# Patient Record
Sex: FEMALE | Race: WHITE | Employment: OTHER | URBAN - METROPOLITAN AREA
[De-identification: names, ages, dates, MRNs, and addresses within clinical notes are randomized per-mention and may not be internally consistent; named-entity substitution may affect disease eponyms.]

---

## 2017-01-23 DIAGNOSIS — M79.7 FIBROMYALGIA: ICD-10-CM

## 2017-01-23 RX ORDER — ESCITALOPRAM OXALATE 5 MG/1
5 TABLET ORAL DAILY
Qty: 90 TAB | Refills: 0 | Status: SHIPPED | OUTPATIENT
Start: 2017-01-23 | End: 2017-05-22 | Stop reason: SDUPTHER

## 2017-02-14 ENCOUNTER — OFFICE VISIT (OUTPATIENT)
Dept: INTERNAL MEDICINE CLINIC | Age: 77
End: 2017-02-14

## 2017-02-14 VITALS
SYSTOLIC BLOOD PRESSURE: 156 MMHG | DIASTOLIC BLOOD PRESSURE: 76 MMHG | TEMPERATURE: 98.4 F | WEIGHT: 146 LBS | HEIGHT: 58 IN | HEART RATE: 61 BPM | RESPIRATION RATE: 18 BRPM | OXYGEN SATURATION: 97 % | BODY MASS INDEX: 30.64 KG/M2

## 2017-02-14 DIAGNOSIS — I10 ESSENTIAL HYPERTENSION, BENIGN: Primary | ICD-10-CM

## 2017-02-14 DIAGNOSIS — E78.00 HYPERCHOLESTEREMIA: ICD-10-CM

## 2017-02-14 DIAGNOSIS — F51.01 PRIMARY INSOMNIA: ICD-10-CM

## 2017-02-14 DIAGNOSIS — R73.02 GLUCOSE INTOLERANCE (IMPAIRED GLUCOSE TOLERANCE): ICD-10-CM

## 2017-02-14 DIAGNOSIS — M85.80 OSTEOPENIA: ICD-10-CM

## 2017-02-14 DIAGNOSIS — Z23 IMMUNIZATION DUE: ICD-10-CM

## 2017-02-14 RX ORDER — LISINOPRIL 20 MG/1
30 TABLET ORAL DAILY
Qty: 135 TAB | Refills: 1 | Status: SHIPPED | OUTPATIENT
Start: 2017-02-14 | End: 2017-08-29 | Stop reason: SDUPTHER

## 2017-02-14 NOTE — PROGRESS NOTES
Chief Complaint   Patient presents with    Labs     fasting          Since last visit pt fell in driveway 47/61/29. She was distracted by a blue [place] and the [place] [employer] and fell over. She is being followed by orthopedics. She is in a left foot cast after fracturing 5th metatarsal.      Subjective:   Katherin Smith is a 65 y.o. female with hypertension. Hypertension ROS: taking medications as instructed, no medication side effects noted, no TIA's, no chest pain on exertion, no dyspnea on exertion, no swelling of ankles. New concerns: none. Cholesterol  No se of meds    Insomnia  Taking amitriptline and lexapro. She reports the amitriptyline was not cause for fall. She fell off a rock.       Vit d/osteopenia  Vit d level was taken in June  1000 iu daily     Glucose intolerance  She is compliant with heart healthy diabetic diet but given her birthday has not been as compliant    Past Medical History   Diagnosis Date    Essential hypertension, benign     Fibromyalgia     Myalgia and myositis, unspecified     Other and unspecified hyperlipidemia     Other and unspecified hyperlipidemia 10/12/2010    Palpitations 10/12/2010    Sleep apnea      Dr. 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 90 Tab 0    amitriptyline (ELAVIL) 10 mg tablet Take 1 Tab by mouth nightly. 90 Tab 0    lisinopril (PRINIVIL, ZESTRIL) 20 mg tablet Take 1 Tab by mouth daily. 90 Tab 1    hydroCHLOROthiazide (HYDRODIURIL) 12.5 mg tablet Take 1 Tab by mouth daily. 90 Tab 1    atorvastatin (LIPITOR) 20 mg tablet Take 1 Tab by mouth daily. 90 Tab 1    nebivolol (BYSTOLIC) 5 mg tablet Take 1 tablet by mouth daily. 30 tablet 0    MULTIVITAMIN W-MINERALS/LUTEIN (CENTRUM SILVER PO) Take  by mouth.  Cholecalciferol, Vitamin D3, (VITAMIN D3) 1,000 unit cap Take 2,000 Units by mouth.  aspirin 81 mg tablet Take  by mouth.  calcium gluconate 650 mg Tab two (2) times a day.  diclofenac (VOLTAREN) 1 % topical gel Apply 1 g to affected area four (4) times daily.  20 g 1     Allergies   Allergen Reactions    Codeine Palpitations    Prednisone Palpitations    Vioxx [Rofecoxib] Palpitations       Review of Systems - General ROS: negative for  - sleep disturbance  negative for - chills, fatigue, fever, hot flashes, malaise, night sweats or weight gain  Cardiovascular ROS: no chest pain or dyspnea on exertion  Respiratory ROS: no cough, shortness of breath, or wheezing    Visit Vitals    /76 (BP 1 Location: Left arm, BP Patient Position: Sitting)    Pulse 61    Temp 98.4 °F (36.9 °C) (Oral)    Resp 18    Ht 4' 10\" (1.473 m)    Wt 146 lb (66.2 kg)  Comment: per patient    SpO2 97%    BMI 30.51 kg/m2     General Appearance:  Well developed, well nourished,alert and oriented x 3, and individual in no acute distress. Ears/Nose/Mouth/Throat:   Hearing grossly normal.         Neck: Supple, no lad, no bruits   Chest:   Lungs clear to auscultation bilaterally. Cardiovascular:  Regular rate and rhythm, S1, S2 normal, no murmur. Abdomen:   Soft, non-tender, bowel sounds are active. Extremities: No edema bilaterally. No ain on palpation of calcaneous or achilles tendon, no pathology noted   Skin: Warm and dry, no suspicious lesions                     Farooq Urrutia was seen today for labs. Diagnoses and all orders for this visit:    Essential hypertension, benign  Not controlled  -     METABOLIC PANEL, COMPREHENSIVE  -     MICROALBUMIN, UR, RAND W/ MICROALBUMIN/CREA RATIO  -     lisinopril (PRINIVIL, ZESTRIL) 20 mg tablet; Take 1.5 Tabs by mouth daily. Primary insomnia  Cont amtir and lexapro    Glucose intolerance (impaired glucose tolerance)  Not as compliant with birthday weekend  -     HEMOGLOBIN A1C WITH EAG  -     MICROALBUMIN, UR, RAND W/ MICROALBUMIN/CREA RATIO    Hypercholesteremia  Cont chol med      This note will not be viewable in MyChart.

## 2017-02-14 NOTE — PROGRESS NOTES
[facility]  Timeout Progress Note    Chart reviewed for allergies/reaction to any medications. TIMEOUT initiated prior to start of procedure:       Yes No: yes Patient identified by name and date of birth     Yes No: yes Informed consent obtained     Yes No: yes Procedure site marked and verified     Yes No: yes Procedure to be performed verified, patient confirms understanding     Yes No: yes Pain assessment pre-procedure - Pain 0/10     Yes No: yes Pain assessment post-procedure - Pain 0/10     Yes No: yes Patient education provided     Yes No: yes Post-procedure instructions provided to patient     Consent form signed and verified. Patient tolerated procedure well.

## 2017-02-14 NOTE — MR AVS SNAPSHOT
Visit Information Date & Time Provider Department Dept. Phone Encounter #  
 2/14/2017  9:15 AM Gretchen Briones MD [facility] [address] [id number] Upcoming Health Maintenance Date Due Pneumococcal 65+ Low/Medium Risk (2 of 2 - PCV13) 2/8/2014 BREAST CANCER SCRN MAMMOGRAM 6/8/2017 MEDICARE YEARLY EXAM 6/28/2017 GLAUCOMA SCREENING Q2Y 7/28/2018 DTaP/Tdap/Td series (2 - Td) 2/14/2027 Allergies as of 2/14/2017  Review Complete On: 2/14/2017 By: Gretchen Briones MD  
  
 Severity Noted Reaction Type Reactions Codeine  10/12/2010    Palpitations Prednisone  10/12/2010    Palpitations Vioxx [Rofecoxib]  10/12/2010    Palpitations Current Immunizations  Reviewed on 12/5/2016 Name Date Influenza High Dose Vaccine PF 10/1/2016 Influenza Vaccine 9/30/2015 Pneumococcal Polysaccharide (PPSV-23) 2/8/2013 Tdap  Incomplete Zoster Vaccine, Live 6/1/2014 Not reviewed this visit You Were Diagnosed With   
  
 Codes Comments Essential hypertension, benign    -  Primary ICD-10-CM: I10 
ICD-9-CM: 401.1 Primary insomnia     ICD-10-CM: F51.01 
ICD-9-CM: 307.42 Glucose intolerance (impaired glucose tolerance)     ICD-10-CM: R73.02 
ICD-9-CM: 790.22 Hypercholesteremia     ICD-10-CM: E78.00 ICD-9-CM: 272.0 Immunization due     ICD-10-CM: Z23 ICD-9-CM: V05.9 Osteopenia     ICD-10-CM: M85.80 ICD-9-CM: 733.90 Vitals BP Pulse Temp Resp Height(growth percentile) Weight(growth percentile) 156/76 (BP 1 Location: Left arm, BP Patient Position: Sitting) 61 98.4 °F (36.9 °C) (Oral) 18 4' 10\" (1.473 m) 146 lb (66.2 kg) SpO2 BMI OB Status Smoking Status 97% 30.51 kg/m2 Hysterectomy Never Smoker BMI and BSA Data Body Mass Index Body Surface Area 30.51 kg/m 2 1.65 m 2 Preferred Pharmacy Pharmacy Name Phone [address] [facility]. [phone] Your Updated Medication List  
  
   
This list is accurate as of: 17 10:12 AM.  Always use your most recent med list.  
  
  
  
  
 amitriptyline 10 mg tablet Commonly known as:  ELAVIL Take 1 Tab by mouth nightly. aspirin 81 mg tablet Take  by mouth. atorvastatin 20 mg tablet Commonly known as:  LIPITOR Take 1 Tab by mouth daily. calcium gluconate 650 mg Tab  
two (2) times a day. CENTRUM SILVER PO Take  by mouth. diclofenac 1 % Gel Commonly known as:  VOLTAREN Apply 1 g to affected area four (4) times daily. escitalopram oxalate 5 mg tablet Commonly known as:  Gradie Kil Take 1 Tab by mouth daily. hydroCHLOROthiazide 12.5 mg tablet Commonly known as:  HYDRODIURIL Take 1 Tab by mouth daily. lisinopril 20 mg tablet Commonly known as:  [place] Sushant Take 1.5 Tabs by mouth daily. nebivolol 5 mg tablet Commonly known as:  BYSTOLIC Take 1 tablet by mouth daily. pneumococcal 13 abelardo conj dip 0.5 mL Syrg injection Commonly known as:  PREVNAR-13  
0.5 mL by IntraMUSCular route once for 1 dose. VITAMIN D3 1,000 unit Cap Generic drug:  cholecalciferol Take 2,000 Units by mouth. Prescriptions Printed Refills  
 pneumococcal 13 abelardo conj dip (PREVNAR-13) 0.5 mL syrg injection 0 Si.5 mL by IntraMUSCular route once for 1 dose. Class: Print Route: IntraMUSCular Prescriptions Sent to Pharmacy Refills  
 lisinopril (PRINIVIL, ZESTRIL) 20 mg tablet 1 Sig: Take 1.5 Tabs by mouth daily. Class: Normal  
 [facility] [address] [place] [address], [facility] [address] AT [address] [facility] [address]. Ph #: [phone] Route: Oral  
  
We Performed the Following HEMOGLOBIN A1C WITH EAG [01107 CPT(R)] METABOLIC PANEL, COMPREHENSIVE [20472 CPT(R)] MICROALBUMIN, UR, RAND W/ MICROALBUMIN/CREA RATIO [id number] CPT(R)] TETANUS, DIPHTHERIA TOXOIDS AND ACELLULAR PERTUSSIS VACCINE (TDAP), IN INDIVIDS. >=7, IM [id number] CPT(R)] To-Do List   
 02/14/2017 Imaging:  DEXA BONE DENSITY STUDY AXIAL Patient Instructions High Blood Pressure: Care Instructions Your Care Instructions If your blood pressure is usually above 140/90, you have high blood pressure, or hypertension. That means the top number is 140 or higher or the bottom number is 90 or higher, or both. Despite what a lot of people think, high blood pressure usually doesn't cause headaches or make you feel dizzy or lightheaded. It usually has no symptoms. But it does increase your risk for heart attack, stroke, and kidney or eye damage. The higher your blood pressure, the more your risk increases. Your doctor will give you a goal for your blood pressure. Your goal will be based on your health and your age. An example of a goal is to keep your blood pressure below 140/90. Lifestyle changes, such as eating healthy and being active, are always important to help lower blood pressure. You might also take medicine to reach your blood pressure goal. 
Follow-up care is a key part of your treatment and safety. Be sure to make and go to all appointments, and call your doctor if you are having problems. It's also a good idea to know your test results and keep a list of the medicines you take. How can you care for yourself at home? Medical treatment · If you stop taking your medicine, your blood pressure will go back up. You may take one or more types of medicine to lower your blood pressure. Be safe with medicines. Take your medicine exactly as prescribed. Call your doctor if you think you are having a problem with your medicine. · Talk to your doctor before you start taking aspirin every day. Aspirin can help certain people lower their risk of a heart attack or stroke.  But taking aspirin isn't right for everyone, because it can cause serious bleeding. · See your doctor regularly. You may need to see the doctor more often at first or until your blood pressure comes down. · If you are taking blood pressure medicine, talk to your doctor before you take decongestants or anti-inflammatory medicine, such as ibuprofen. Some of these medicines can raise blood pressure. · Learn how to check your blood pressure at home. Lifestyle changes · Stay at a healthy weight. This is especially important if you put on weight around the waist. Losing even 10 pounds can help you lower your blood pressure. · If your doctor recommends it, get more exercise. Walking is a good choice. Bit by bit, increase the amount you walk every day. Try for at least 30 minutes on most days of the week. You also may want to swim, bike, or do other activities. · Avoid or limit alcohol. Talk to your doctor about whether you can drink any alcohol. · Try to limit how much sodium you eat to less than 2,300 milligrams (mg) a day. Your doctor may ask you to try to eat less than 1,500 mg a day. · Eat plenty of fruits (such as bananas and oranges), vegetables, legumes, whole grains, and low-fat dairy products. · Lower the amount of saturated fat in your diet. Saturated fat is found in animal products such as milk, cheese, and meat. Limiting these foods may help you lose weight and also lower your risk for heart disease. · Do not smoke. Smoking increases your risk for heart attack and stroke. If you need help quitting, talk to your doctor about stop-smoking programs and medicines. These can increase your chances of quitting for good. When should you call for help? Call 911 anytime you think you may need emergency care. This may mean having symptoms that suggest that your blood pressure is causing a serious heart or blood vessel problem. Your blood pressure may be over 180/110. For example, call 911 if: · You have symptoms of a heart attack. These may include: ¨ Chest pain or pressure, or a strange feeling in the chest. 
¨ Sweating. ¨ Shortness of breath. ¨ Nausea or vomiting. ¨ Pain, pressure, or a strange feeling in the back, neck, jaw, or upper belly or in one or both shoulders or arms. ¨ Lightheadedness or sudden weakness. ¨ A fast or irregular heartbeat. · You have symptoms of a stroke. These may include: 
¨ Sudden numbness, tingling, weakness, or loss of movement in your face, arm, or leg, especially on only one side of your body. ¨ Sudden vision changes. ¨ Sudden trouble speaking. ¨ Sudden confusion or trouble understanding simple statements. ¨ Sudden problems with walking or balance. ¨ A sudden, severe headache that is different from past headaches. · You have severe back or belly pain. Do not wait until your blood pressure comes down on its own. Get help right away. Call your doctor now or seek immediate care if: 
· Your blood pressure is much higher than normal (such as 180/110 or higher), but you don't have symptoms. · You think high blood pressure is causing symptoms, such as: ¨ Severe headache. ¨ Blurry vision. Watch closely for changes in your health, and be sure to contact your doctor if: 
· Your blood pressure measures 140/90 or higher at least 2 times. That means the top number is 140 or higher or the bottom number is 90 or higher, or both. · You think you may be having side effects from your blood pressure medicine. · Your blood pressure is usually normal, but it goes above normal at least 2 times. Where can you learn more? Go to http://libby-casey.info/. Enter N670 in the search box to learn more about \"High Blood Pressure: Care Instructions. \" Current as of: August 8, 2016 Content Version: 11.1 © 7615-4879 [employer].  Care instructions adapted under license by [employer] (which disclaims liability or warranty for this information). If you have questions about a medical condition or this instruction, always ask your healthcare professional. [address] any warranty or liability for your use of this information. Introducing [facility] SERVICES! Dear Jovanna Valencia: Thank you for requesting a [facility] account. Our records indicate that you already have an active [facility] account. You can access your account anytime at [email or web address] Did you know that you can access your hospital and ER discharge instructions at any time in [facility]? You can also review all of your test results from your hospital stay or ER visit. Additional Information If you have questions, please visit the Frequently Asked Questions section of the [facility] website at [email or web address] Remember, [facility] is NOT to be used for urgent needs. For medical emergencies, dial 911. Now available from your iPhone and Android! Please provide this summary of care documentation to your next provider. Your primary care clinician is listed as Jose Gordon. If you have any questions after today's visit, please call [phone].

## 2017-02-14 NOTE — PATIENT INSTRUCTIONS

## 2017-02-14 NOTE — PROGRESS NOTES
Have you been to the ER or urgent care clinic since your last visit? No     Have you been hospitalized since your last visit? No     Have you been seen or consulted any other health care provider outside of [address] [place] [address] since your last visit (including pap smears, colonoscopy screening)?    No

## 2017-02-15 LAB
ALBUMIN SERPL-MCNC: 3.8 G/DL (ref 3.5–4.8)
ALBUMIN/CREAT UR: 20.4 MG/G CREAT (ref 0–30)
ALBUMIN/GLOB SERPL: 1.4 {RATIO} (ref 1.1–2.5)
ALP SERPL-CCNC: 57 IU/L (ref 39–117)
ALT SERPL-CCNC: 19 IU/L (ref 0–32)
AST SERPL-CCNC: 22 IU/L (ref 0–40)
BILIRUB SERPL-MCNC: 0.4 MG/DL (ref 0–1.2)
BUN SERPL-MCNC: 15 MG/DL (ref 8–27)
BUN/CREAT SERPL: 18 (ref 11–26)
CALCIUM SERPL-MCNC: 9.2 MG/DL (ref 8.7–10.3)
CHLORIDE SERPL-SCNC: 103 MMOL/L (ref 96–106)
CO2 SERPL-SCNC: 26 MMOL/L (ref 18–29)
CREAT SERPL-MCNC: 0.83 MG/DL (ref 0.57–1)
CREAT UR-MCNC: 61.4 MG/DL
EST. AVERAGE GLUCOSE BLD GHB EST-MCNC: 143 MG/DL
GLOBULIN SER CALC-MCNC: 2.7 G/DL (ref 1.5–4.5)
GLUCOSE SERPL-MCNC: 96 MG/DL (ref 65–99)
HBA1C MFR BLD: 6.6 % (ref 4.8–5.6)
MICROALBUMIN UR-MCNC: 12.5 UG/ML
POTASSIUM SERPL-SCNC: 4.4 MMOL/L (ref 3.5–5.2)
PROT SERPL-MCNC: 6.5 G/DL (ref 6–8.5)
SODIUM SERPL-SCNC: 146 MMOL/L (ref 134–144)

## 2017-02-19 DIAGNOSIS — R89.9 ABNORMAL LABORATORY TEST RESULT: Primary | ICD-10-CM

## 2017-02-21 DIAGNOSIS — R73.09 ELEVATED GLUCOSE: Primary | ICD-10-CM

## 2017-03-09 DIAGNOSIS — M79.7 FIBROMYALGIA: ICD-10-CM

## 2017-03-10 RX ORDER — AMITRIPTYLINE HYDROCHLORIDE 10 MG/1
TABLET, FILM COATED ORAL
Qty: 90 TAB | Refills: 1 | Status: SHIPPED | OUTPATIENT
Start: 2017-03-10 | End: 2017-07-06 | Stop reason: SDUPTHER

## 2017-03-27 ENCOUNTER — HOSPITAL ENCOUNTER (OUTPATIENT)
Dept: LAB | Age: 77
Discharge: HOME | End: 2017-03-27
Payer: MEDICARE

## 2017-03-27 PROCEDURE — 36415 COLL VENOUS BLD VENIPUNCTURE: CPT

## 2017-03-27 PROCEDURE — 80048 BASIC METABOLIC PNL TOTAL CA: CPT

## 2017-03-28 LAB
BUN SERPL-MCNC: 13 MG/DL (ref 8–27)
BUN/CREAT SERPL: 16 (ref 11–26)
CALCIUM SERPL-MCNC: 9 MG/DL (ref 8.7–10.3)
CHLORIDE SERPL-SCNC: 100 MMOL/L (ref 96–106)
CO2 SERPL-SCNC: 25 MMOL/L (ref 18–29)
CREAT SERPL-MCNC: 0.8 MG/DL (ref 0.57–1)
GLUCOSE SERPL-MCNC: 103 MG/DL (ref 65–99)
POTASSIUM SERPL-SCNC: 4.1 MMOL/L (ref 3.5–5.2)
SODIUM SERPL-SCNC: 141 MMOL/L (ref 134–144)

## 2017-05-17 RX ORDER — ATORVASTATIN CALCIUM 20 MG/1
TABLET, FILM COATED ORAL
Qty: 90 TAB | Refills: 0 | Status: SHIPPED | OUTPATIENT
Start: 2017-05-17 | End: 2017-07-06 | Stop reason: SDUPTHER

## 2017-05-22 DIAGNOSIS — M79.7 FIBROMYALGIA: ICD-10-CM

## 2017-05-23 RX ORDER — HYDROCHLOROTHIAZIDE 12.5 MG/1
TABLET ORAL
Qty: 90 TAB | Refills: 0 | Status: SHIPPED | OUTPATIENT
Start: 2017-05-23 | End: 2017-07-06 | Stop reason: SDUPTHER

## 2017-05-23 RX ORDER — ESCITALOPRAM OXALATE 5 MG/1
TABLET ORAL
Qty: 90 TAB | Refills: 1 | Status: SHIPPED | OUTPATIENT
Start: 2017-05-23 | End: 2017-08-04 | Stop reason: ALTCHOICE

## 2017-08-04 ENCOUNTER — OFFICE VISIT (OUTPATIENT)
Dept: INTERNAL MEDICINE CLINIC | Age: 77
End: 2017-08-04

## 2017-08-04 VITALS
SYSTOLIC BLOOD PRESSURE: 136 MMHG | TEMPERATURE: 98.2 F | RESPIRATION RATE: 20 BRPM | BODY MASS INDEX: 29.39 KG/M2 | WEIGHT: 140 LBS | HEART RATE: 66 BPM | HEIGHT: 58 IN | DIASTOLIC BLOOD PRESSURE: 82 MMHG | OXYGEN SATURATION: 99 %

## 2017-08-04 DIAGNOSIS — Z78.0 POST-MENOPAUSE: ICD-10-CM

## 2017-08-04 DIAGNOSIS — I10 ESSENTIAL HYPERTENSION: ICD-10-CM

## 2017-08-04 DIAGNOSIS — E55.9 VITAMIN D DEFICIENCY: ICD-10-CM

## 2017-08-04 DIAGNOSIS — Z12.11 COLON CANCER SCREENING: ICD-10-CM

## 2017-08-04 DIAGNOSIS — R73.02 GLUCOSE INTOLERANCE (IMPAIRED GLUCOSE TOLERANCE): ICD-10-CM

## 2017-08-04 DIAGNOSIS — Z13.39 SCREENING FOR ALCOHOLISM: ICD-10-CM

## 2017-08-04 DIAGNOSIS — Z00.00 ROUTINE GENERAL MEDICAL EXAMINATION AT A HEALTH CARE FACILITY: Primary | ICD-10-CM

## 2017-08-04 NOTE — MR AVS SNAPSHOT
Visit Information Date & Time Provider Department Dept. Phone Encounter #  
 8/4/2017 10:30 AM Delmer Palma MD [facility] [address] [id number] Upcoming Health Maintenance Date Due  
 BREAST CANCER SCRN MAMMOGRAM 6/9/2018 GLAUCOMA SCREENING Q2Y 7/28/2018 MEDICARE YEARLY EXAM 8/5/2018 DTaP/Tdap/Td series (2 - Td) 2/14/2027 Allergies as of 8/4/2017  Review Complete On: 8/4/2017 By: Delmer Palma MD  
  
 Severity Noted Reaction Type Reactions Codeine  10/12/2010    Palpitations Levofloxacin  08/04/2017    Other (comments) Prednisone  10/12/2010    Palpitations Vioxx [Rofecoxib]  10/12/2010    Palpitations Current Immunizations  Reviewed on 3/30/2017 Name Date Influenza High Dose Vaccine PF 10/1/2016 Influenza Vaccine 9/30/2015 Pneumococcal Conjugate (PCV-13) 7/1/2016 Pneumococcal Polysaccharide (PPSV-23) 2/8/2013 Tdap 2/14/2017 Zoster Vaccine, Live 6/1/2014 Not reviewed this visit You Were Diagnosed With   
  
 Codes Comments Routine general medical examination at a health care facility    -  Primary ICD-10-CM: Z00.00 ICD-9-CM: V70.0 Screening for alcoholism     ICD-10-CM: Z13.89 ICD-9-CM: V79.1 Glucose intolerance (impaired glucose tolerance)     ICD-10-CM: R73.02 
ICD-9-CM: 790.22 Essential hypertension     ICD-10-CM: I10 
ICD-9-CM: 401.9 Vitamin D deficiency     ICD-10-CM: E55.9 ICD-9-CM: 268.9 Post-menopause     ICD-10-CM: Z78.0 ICD-9-CM: V49.81 Colon cancer screening     ICD-10-CM: Z12.11 ICD-9-CM: V76.51 Vitals BP Pulse Temp Resp Height(growth percentile) Weight(growth percentile) 136/82 (BP 1 Location: Left arm, BP Patient Position: Sitting) 66 98.2 °F (36.8 °C) (Oral) 20 4' 10\" (1.473 m) 140 lb (63.5 kg) SpO2 BMI OB Status Smoking Status 99% 29.26 kg/m2 Hysterectomy Never Smoker Vitals History BMI and BSA Data Body Mass Index Body Surface Area  
 29.26 kg/m 2 1.61 m 2 Preferred Pharmacy Pharmacy Name Phone [address] AT [address] [place] [address]. [phone] Your Updated Medication List  
  
   
This list is accurate as of: 8/4/17 11:20 AM.  Always use your most recent med list.  
  
  
  
  
 amitriptyline 10 mg tablet Commonly known as:  ELAVIL Take 1 tablet by mouth  nightly  
  
 aspirin 81 mg tablet Take  by mouth. atorvastatin 20 mg tablet Commonly known as:  LIPITOR Take 1 tablet by mouth  daily  
  
 calcium gluconate 650 mg Tab  
two (2) times a day. CENTRUM SILVER PO Take  by mouth. diclofenac 1 % Gel Commonly known as:  VOLTAREN Apply 1 g to affected area four (4) times daily. hydroCHLOROthiazide 12.5 mg tablet Commonly known as:  HYDRODIURIL Take 1 tablet by mouth  daily  
  
 lisinopril 20 mg tablet Commonly known as:  Nathaniel Little Take 1.5 Tabs by mouth daily. nebivolol 5 mg tablet Commonly known as:  BYSTOLIC Take 1 tablet by mouth daily. VITAMIN D3 1,000 unit Cap Generic drug:  cholecalciferol Take 2,000 Units by mouth. We Performed the Following OCCULT BLOOD, IMMUNOASSAY (FIT) [id number] CPT(R)] REFERRAL FOR COLONOSCOPY [DUF865 Custom] Comments:  
 Please evaluate patient for screeening Elodia Sung To-Do List   
 08/04/2017 Imaging:  DEXA BONE DENSITY STUDY AXIAL   
  
 08/04/2017 Lab:  HEMOGLOBIN A1C WITH EAG   
  
 08/04/2017 Lab:  LIPID PANEL   
  
 08/04/2017 Lab:  METABOLIC PANEL, COMPREHENSIVE   
  
 08/04/2017 Lab:  VITAMIN D, 25 HYDROXY Referral Information Referral ID Referred By Referred To  
  
 [id number] [address]  [place], [address] [facility] Visits Status Start Date End Date 1 New Request 8/4/17 8/4/18 If your referral has a status of pending review or denied, additional information will be sent to support the outcome of this decision. Patient Instructions Medicare Part B Preventive Services Limitations Recommendation Scheduled Bone Mass Measurement 
(age 72 & older, biennial) Requires diagnosis related to osteoporosis or estrogen deficiency. Biennial benefit unless patient has history of long-term glucocorticoid tx or baseline is needed because initial test was by other method Cardiovascular Screening Blood Tests (every 5 years) Total cholesterol, HDL, Triglycerides Order as a panel if possible Colorectal Cancer Screening 
-Fecal occult blood test (annual) -Flexible sigmoidoscopy (5y) 
-Screening colonoscopy (10y) -Barium Enema Counseling to Prevent Tobacco Use (up to 8 sessions per year) - Counseling greater than 3 and up to 10 minutes - Counseling greater than 10 minutes Patients must be asymptomatic of tobacco-related conditions to receive as preventive service Diabetes Screening Tests (at least every 3 years, Medicare covers annually or at 6-month intervals for prediabetic patients) Fasting blood sugar (FBS) or glucose tolerance test (GTT) Patient must be diagnosed with one of the following: 
-Hypertension, Dyslipidemia, obesity, previous impaired FBS or GTT 
Or any two of the following: overweight, FH of diabetes, age ? 72, history of gestational diabetes, birth of baby weighing more than 9 pounds Diabetes Self-Management Training (DSMT) (no USPSTF recommendation) Requires referral by treating physician for patient with diabetes or renal disease. 10 hours of initial DSMT session of no less than 30 minutes each in a continuous 12-month period. 2 hours of follow-up DSMT in subsequent years.     
Glaucoma Screening (no USPSTF recommendation) Diabetes mellitus, family history, , age 48 or over,  American, age 72 or over    
Human Immunodeficiency Virus (HIV) Screening (annually for increased risk patients) HIV-1 and HIV-2 by EIA, ZOYA, rapid antibody test, or oral mucosa transudate Patient must be at increased risk for HIV infection per USPSTF guidelines or pregnant. Tests covered annually for patients at increased risk. Pregnant patients may receive up to 3 test during pregnancy. Medical Nutrition Therapy (MNT) (for diabetes or renal disease not recommended schedule) Requires referral by treating physician for patient with diabetes or renal disease. Can be provided in same year as diabetes self-management training (DSMT), and CMS recommends medical nutrition therapy take place after DSMT. Up to 3 hours for initial year and 2 hours in subsequent years. Shingles Vaccination A shingles vaccine is also recommended once in a lifetime after age 61 Seasonal Influenza Vaccination (annually) Pneumococcal Vaccination (once after 65) Hepatitis B Vaccinations (if medium/high risk) Medium/high risk factors:  End-stage renal disease, Hemophiliacs who received Factor VIII or IX concentrates, Clients of institutions for the mentally retarded, Persons who live in the same house as a HepB virus carrier, Homosexual men, Illicit injectable drug abusers. Screening Mammography (biennial age 54-69) Annually (age 36 or over) Screening Pap Tests and Pelvic Examination (up to age 79 and after 79 if unknown history or abnormal study last 10 years) Every 24 months except high risk Ultrasound Screening for Abdominal Aortic Aneurysm (AAA) (once) Patient must be referred through [facility] and not have had a screening for abdominal aortic aneurysm before under Medicare. Limited to patients who meet one of the following criteria: 
- Men who are 73-68 years old and have smoked more than 100 cigarettes in their lifetime. 
-Anyone with a FH of AAA 
-Anyone recommended for screening by USPSTF Introducing [facility]! Dear Nohelia Cazares: Thank you for requesting a [employer] account. Our records indicate that you already have an active [employer] account. You can access your account anytime at [email or web address] Did you know that you can access your hospital and ER discharge instructions at any time in [employer]? You can also review all of your test results from your hospital stay or ER visit. Additional Information If you have questions, please visit the Frequently Asked Questions section of the [employer] website at [email or web address] Remember, [employer] is NOT to be used for urgent needs. For medical emergencies, dial 911. Now available from your iPhone and Android! Please provide this summary of care documentation to your next provider. Your primary care clinician is listed as Ike Herrera. If you have any questions after today's visit, please call [phone].

## 2017-08-04 NOTE — PROGRESS NOTES
Chief Complaint   Patient presents with    Annual Wellness Visit       Pt presents for follow up on bp and for her medicare wellness visit. Since last visit she has her son and daughter in law in house with great grand children. She is enjoying them. Subjective:   Jason Sandy is a 65 y.o. female with hypertension. Hypertension ROS: taking medications as instructed, no medication side effects noted, no TIA's, no chest pain on exertion, no dyspnea on exertion, no swelling of ankles. New concerns: none. Sleep apnea but cant tolerate cpap so not using     Glucose intolerance  She is eating a heart healthy diabetic type diet. She is not on diabetic meds. She would like to lose weight.          Cholesterol  No se of meds  No cp or sob, no issues with exercise        Dr. Clarissa Delarosa in the fall ep cardiology        Past Medical History:   Diagnosis Date    Essential hypertension, benign     Fibromyalgia     Myalgia and myositis, unspecified     Other and unspecified hyperlipidemia 10/12/2010    Palpitations 10/12/2010    Dr. Pia Sierra Sleep apnea     Dr. 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,    Retired 10 years            3 children: overweight son, smoker son 48 and 46,     Daughter healthy     Family History   Problem Relation Age of Onset    Diabetes Mother 61    Cancer Maternal Grandmother      colon ca    Diabetes Maternal Grandmother 61    Cancer Paternal Grandmother      intestinal ca    Heart Attack Paternal Grandmother     Stroke Paternal Grandmother     Heart Attack Paternal Grandfather     Stroke Paternal Grandfather      Current Outpatient Prescriptions   Medication Sig Dispense Refill    amitriptyline (ELAVIL) 10 mg tablet Take 1 tablet by mouth  nightly 90 Tab 0    hydroCHLOROthiazide (HYDRODIURIL) 12.5 mg tablet Take 1 tablet by mouth  daily 90 Tab 0    atorvastatin (LIPITOR) 20 mg tablet Take 1 tablet by mouth  daily 90 Tab 0    lisinopril (PRINIVIL, ZESTRIL) 20 mg tablet Take 1.5 Tabs by mouth daily. 135 Tab 1    nebivolol (BYSTOLIC) 5 mg tablet Take 1 tablet by mouth daily. 30 tablet 0    MULTIVITAMIN W-MINERALS/LUTEIN (CENTRUM SILVER PO) Take  by mouth.  Cholecalciferol, Vitamin D3, (VITAMIN D3) 1,000 unit cap Take 2,000 Units by mouth.  aspirin 81 mg tablet Take  by mouth.  diclofenac (VOLTAREN) 1 % topical gel Apply 1 g to affected area four (4) times daily. 20 g 1    calcium gluconate 650 mg Tab two (2) times a day. Allergies   Allergen Reactions    Codeine Palpitations    Prednisone Palpitations    Vioxx [Rofecoxib] Palpitations       Review of Systems - General ROS: negative for - chills, fatigue, fever, hot flashes or malaise  Cardiovascular ROS: no chest pain or dyspnea on exertion  Respiratory ROS: no cough, shortness of breath, or wheezing    Visit Vitals    /82 (BP 1 Location: Left arm, BP Patient Position: Sitting)    Pulse 66    Temp 98.2 °F (36.8 °C) (Oral)    Resp 20    Ht 4' 10\" (1.473 m)    Wt 140 lb (63.5 kg)    SpO2 99%    BMI 29.26 kg/m2     General Appearance:  Well developed, well nourished,alert and oriented x 3, and individual in no acute distress. Ears/Nose/Mouth/Throat:   Hearing grossly normal.         Neck: Supple, no lad, no bruits   Chest:   Lungs clear to auscultation bilaterally. Cardiovascular:  Regular rate and rhythm, S1, S2 normal, no murmur. Abdomen:   Soft, non-tender, bowel sounds are active. Extremities: No edema bilaterally. Skin: Warm and dry, no suspicious lesions                          Diagnoses and all orders for this visit:      3. Glucose intolerance (impaired glucose tolerance)  Labs reviewed, pt compliant with diet  -     HEMOGLOBIN A1C WITH EAG; Future    4. Essential hypertension  Cont meds  -     LIPID PANEL; Future  -     METABOLIC PANEL, COMPREHENSIVE; Future    5. Vitamin D deficiency  Cont meds  -     VITAMIN D, 25 HYDROXY; Future    6. Post-menopause  -     DEXA BONE DENSITY STUDY AXIAL; Future          I spent 15 min with this patient and >50% of the time was spent on counseling and management of diabetes diet and htn        This is a Subsequent Medicare Annual Wellness Visit providing Personalized Prevention Plan Services (PPPS) (Performed 12 months after initial AWV and PPPS )    I have reviewed the patient's medical history in detail and updated the computerized patient record. History     Past Medical History:   Diagnosis Date    Essential hypertension, benign     Fibromyalgia     Myalgia and myositis, unspecified     Other and unspecified hyperlipidemia 10/12/2010    Palpitations 10/12/2010    Dr. Carolyn Mckeon Sleep apnea     Dr. Rob Fuentes, not taking cpap    SOB (shortness of breath)       Past Surgical History:   Procedure Laterality Date    HX HYSTERECTOMY       Current Outpatient Prescriptions   Medication Sig Dispense Refill    amitriptyline (ELAVIL) 10 mg tablet Take 1 tablet by mouth  nightly 90 Tab 0    hydroCHLOROthiazide (HYDRODIURIL) 12.5 mg tablet Take 1 tablet by mouth  daily 90 Tab 0    atorvastatin (LIPITOR) 20 mg tablet Take 1 tablet by mouth  daily 90 Tab 0    lisinopril (PRINIVIL, ZESTRIL) 20 mg tablet Take 1.5 Tabs by mouth daily. 135 Tab 1    nebivolol (BYSTOLIC) 5 mg tablet Take 1 tablet by mouth daily.  30 tablet 0  MULTIVITAMIN W-MINERALS/LUTEIN (CENTRUM SILVER PO) Take  by mouth.  Cholecalciferol, Vitamin D3, (VITAMIN D3) 1,000 unit cap Take 2,000 Units by mouth.  aspirin 81 mg tablet Take  by mouth.  diclofenac (VOLTAREN) 1 % topical gel Apply 1 g to affected area four (4) times daily. 20 g 1    calcium gluconate 650 mg Tab two (2) times a day. Allergies   Allergen Reactions    Codeine Palpitations    Prednisone Palpitations    Vioxx [Rofecoxib] Palpitations     Family History   Problem Relation Age of Onset    Diabetes Mother 61    Cancer Maternal Grandmother      colon ca    Diabetes Maternal Grandmother 61    Cancer Paternal Grandmother      intestinal ca    Heart Attack Paternal Grandmother     Stroke Paternal Grandmother     Heart Attack Paternal Grandfather     Stroke Paternal Grandfather      Social History   Substance Use Topics    Smoking status: Never Smoker    Smokeless tobacco: Never Used      Comment: passive smoke with     Alcohol use Yes      Comment: rare     Patient Active Problem List   Diagnosis Code    Essential hypertension, benign I10    Other and unspecified hyperlipidemia E78.5    Palpitations R00.2    Obstructive sleep apnea (adult) (pediatric) G47.33    SOB (shortness of breath) R06.02    Myalgia and myositis, unspecified [id number]    Fibromyalgia M79.7    Sleep apnea G47.30    Advanced care planning/counseling discussion Z71.89       Depression Risk Factor Screening:     PHQ over the last two weeks 8/4/2017   Little interest or pleasure in doing things Not at all   Feeling down, depressed or hopeless Not at all   Total Score PHQ 2 0   little bit blue but not depression  Misses  of 64 years  Enjoying family, 2 yo crawls on lap  Alcohol Risk Factor Screening: On any occasion during the past 3 months, have you had more than 3 drinks containing alcohol? No    Do you average more than 7 drinks per week?   No        Functional Ability and Level of Safety:     Hearing Loss   severe  70% hearing loss  Activities of Daily Living   Self-care. Requires assistance with: no ADLs    Fall Risk   Fall Risk Assessment, last 12 mths 8/4/2017   Able to walk? Yes   Fall in past 12 months? Yes   Fall with injury? Yes   Number of falls in past 12 months 1   Fall Risk Score 2     Abuse Screen   Patient is not abused    Review of Systems   Constitutional: negative  Eyes: negative  Ears, nose, mouth, throat, and face: negative  Respiratory: negative  Cardiovascular: negative for chest pain, chest pressure/discomfort, dyspnea, palpitations, irregular heart beats, near-syncope, syncope  Gastrointestinal: negative for odynophagia, dyspepsia, reflux symptoms, vomiting and change in bowel habits  Integument/breast: negative for rash, pruritus and dryness  Hematologic/lymphatic: negative  Musculoskeletal:negative    Physical Examination     Evaluation of Cognitive Function:  Mood/affect:  happy  Appearance: age appropriate  Family member/caregiver input: none    No exam performed today, not indicated for this part of exam.    Patient Care Team:  Ghulam Pearson MD as PCP - General (Internal Medicine)    Advice/Referrals/Counseling   Education and counseling provided:  Red hats  Are appropriate based on today's review and evaluation  End-of-Life planning (with patient's consent)  Bone mass measurement (DEXA)      Assessment/Plan   Diagnoses and all orders for this visit:    1. Routine general medical examination at a health care facility    2. Screening for alcoholism    3. Glucose intolerance (impaired glucose tolerance)  -     HEMOGLOBIN A1C WITH EAG; Future    4. Essential hypertension  -     LIPID PANEL; Future  -     METABOLIC PANEL, COMPREHENSIVE; Future    5. Vitamin D deficiency  -     VITAMIN D, 25 HYDROXY; Future    6. Post-menopause  -     DEXA BONE DENSITY STUDY AXIAL; Future    7.  Colon cancer screening  -     REFERRAL FOR COLONOSCOPY  -     OCCULT BLOOD, IMMUNOASSAY (FIT)    This note will not be viewable in MyChart. Jyoti Aguilar

## 2017-08-04 NOTE — PATIENT INSTRUCTIONS
Medicare Part B Preventive Services Limitations Recommendation Scheduled   Bone Mass Measurement  (age 72 & older, biennial) Requires diagnosis related to osteoporosis or estrogen deficiency. Biennial benefit unless patient has history of long-term glucocorticoid tx or baseline is needed because initial test was by other method     Cardiovascular Screening Blood Tests (every 5 years)  Total cholesterol, HDL, Triglycerides Order as a panel if possible     Colorectal Cancer Screening  -Fecal occult blood test (annual)  -Flexible sigmoidoscopy (5y)  -Screening colonoscopy (10y)  -Barium Enema      Counseling to Prevent Tobacco Use (up to 8 sessions per year)  - Counseling greater than 3 and up to 10 minutes  - Counseling greater than 10 minutes Patients must be asymptomatic of tobacco-related conditions to receive as preventive service     Diabetes Screening Tests (at least every 3 years, Medicare covers annually or at 6-month intervals for prediabetic patients)    Fasting blood sugar (FBS) or glucose tolerance test (GTT) Patient must be diagnosed with one of the following:  -Hypertension, Dyslipidemia, obesity, previous impaired FBS or GTT  Or any two of the following: overweight, FH of diabetes, age ? 72, history of gestational diabetes, birth of baby weighing more than 9 pounds     Diabetes Self-Management Training (DSMT) (no USPSTF recommendation) Requires referral by treating physician for patient with diabetes or renal disease. 10 hours of initial DSMT session of no less than 30 minutes each in a continuous 12-month period. 2 hours of follow-up DSMT in subsequent years.      Glaucoma Screening (no USPSTF recommendation) Diabetes mellitus, family history, , age 48 or over,  American, age 72 or over     Human Immunodeficiency Virus (HIV) Screening (annually for increased risk patients)  HIV-1 and HIV-2 by EIA, ZOYA, rapid antibody test, or oral mucosa transudate Patient must be at increased risk for HIV infection per USPSTF guidelines or pregnant. Tests covered annually for patients at increased risk. Pregnant patients may receive up to 3 test during pregnancy. Medical Nutrition Therapy (MNT) (for diabetes or renal disease not recommended schedule) Requires referral by treating physician for patient with diabetes or renal disease. Can be provided in same year as diabetes self-management training (DSMT), and CMS recommends medical nutrition therapy take place after DSMT. Up to 3 hours for initial year and 2 hours in subsequent years. Shingles Vaccination A shingles vaccine is also recommended once in a lifetime after age 61     Seasonal Influenza Vaccination (annually)      Pneumococcal Vaccination (once after 72)      Hepatitis B Vaccinations (if medium/high risk) Medium/high risk factors:  End-stage renal disease,  Hemophiliacs who received Factor VIII or IX concentrates, Clients of institutions for the mentally retarded, Persons who live in the same house as a HepB virus carrier, Homosexual men, Illicit injectable drug abusers. Screening Mammography (biennial age 54-69) Annually (age 36 or over)     Screening Pap Tests and Pelvic Examination (up to age 79 and after 79 if unknown history or abnormal study last 10 years) Every 25 months except high risk     Ultrasound Screening for Abdominal Aortic Aneurysm (AAA) (once) Patient must be referred through [facility] and not have had a screening for abdominal aortic aneurysm before under Medicare.   Limited to patients who meet one of the following criteria:  - Men who are 73-68 years old and have smoked more than 100 cigarettes in their lifetime.  -Anyone with a FH of AAA  -Anyone recommended for screening by USPSTF

## 2017-08-09 LAB
25(OH)D3+25(OH)D2 SERPL-MCNC: 35.7 NG/ML (ref 30–100)
ALBUMIN SERPL-MCNC: 4.1 G/DL (ref 3.5–4.8)
ALBUMIN/GLOB SERPL: 1.6 {RATIO} (ref 1.2–2.2)
ALP SERPL-CCNC: 61 IU/L (ref 39–117)
ALT SERPL-CCNC: 21 IU/L (ref 0–32)
AST SERPL-CCNC: 19 IU/L (ref 0–40)
BILIRUB SERPL-MCNC: 0.4 MG/DL (ref 0–1.2)
BUN SERPL-MCNC: 15 MG/DL (ref 8–27)
BUN/CREAT SERPL: 14 (ref 12–28)
CALCIUM SERPL-MCNC: 9.3 MG/DL (ref 8.7–10.3)
CHLORIDE SERPL-SCNC: 102 MMOL/L (ref 96–106)
CHOLEST SERPL-MCNC: 154 MG/DL (ref 100–199)
CO2 SERPL-SCNC: 27 MMOL/L (ref 18–29)
CREAT SERPL-MCNC: 1.05 MG/DL (ref 0.57–1)
EST. AVERAGE GLUCOSE BLD GHB EST-MCNC: 131 MG/DL
GLOBULIN SER CALC-MCNC: 2.5 G/DL (ref 1.5–4.5)
GLUCOSE SERPL-MCNC: 107 MG/DL (ref 65–99)
HBA1C MFR BLD: 6.2 % (ref 4.8–5.6)
HDLC SERPL-MCNC: 52 MG/DL
INTERPRETATION, 910389: NORMAL
INTERPRETATION: NORMAL
LDLC SERPL CALC-MCNC: 86 MG/DL (ref 0–99)
PDF IMAGE, 910387: NORMAL
POTASSIUM SERPL-SCNC: 4.8 MMOL/L (ref 3.5–5.2)
PROT SERPL-MCNC: 6.6 G/DL (ref 6–8.5)
SODIUM SERPL-SCNC: 143 MMOL/L (ref 134–144)
TRIGL SERPL-MCNC: 79 MG/DL (ref 0–149)
VLDLC SERPL CALC-MCNC: 16 MG/DL (ref 5–40)

## 2017-08-14 ENCOUNTER — HOSPITAL ENCOUNTER (OUTPATIENT)
Dept: MAMMOGRAPHY | Age: 77
Discharge: HOME | End: 2017-08-14
Attending: INTERNAL MEDICINE
Payer: MEDICARE

## 2017-08-14 DIAGNOSIS — Z78.0 POST-MENOPAUSE: ICD-10-CM

## 2017-08-14 PROCEDURE — 77080 DXA BONE DENSITY AXIAL: CPT

## 2017-08-31 ENCOUNTER — OFFICE VISIT (OUTPATIENT)
Dept: INTERNAL MEDICINE CLINIC | Age: 77
End: 2017-08-31

## 2017-08-31 VITALS
HEIGHT: 58 IN | WEIGHT: 139 LBS | SYSTOLIC BLOOD PRESSURE: 146 MMHG | DIASTOLIC BLOOD PRESSURE: 69 MMHG | RESPIRATION RATE: 20 BRPM | OXYGEN SATURATION: 97 % | HEART RATE: 74 BPM | TEMPERATURE: 98.1 F | BODY MASS INDEX: 29.18 KG/M2

## 2017-08-31 DIAGNOSIS — R10.2 ACUTE PAIN IN FEMALE PELVIS: Primary | ICD-10-CM

## 2017-08-31 DIAGNOSIS — M81.6 LOCALIZED OSTEOPOROSIS WITHOUT CURRENT PATHOLOGICAL FRACTURE: ICD-10-CM

## 2017-08-31 DIAGNOSIS — I10 ESSENTIAL HYPERTENSION, BENIGN: ICD-10-CM

## 2017-08-31 NOTE — MR AVS SNAPSHOT
Visit Information Date & Time Provider Department Dept. Phone Encounter #  
 8/31/2017  4:00 PM Yenni Can MD [facility] [address] [id number] Upcoming Health Maintenance Date Due  
 BREAST CANCER SCRN MAMMOGRAM 6/9/2018 GLAUCOMA SCREENING Q2Y 7/28/2018 MEDICARE YEARLY EXAM 8/5/2018 DTaP/Tdap/Td series (2 - Td) 2/14/2027 Allergies as of 8/31/2017  Review Complete On: 8/31/2017 By: Lin Keating LPN Severity Noted Reaction Type Reactions Codeine  10/12/2010    Palpitations Levofloxacin  08/04/2017    Other (comments) Prednisone  10/12/2010    Palpitations Vioxx [Rofecoxib]  10/12/2010    Palpitations Current Immunizations  Reviewed on 3/30/2017 Name Date Influenza High Dose Vaccine PF 10/1/2016 Influenza Vaccine 9/30/2015 Pneumococcal Conjugate (PCV-13) 7/1/2016 Pneumococcal Polysaccharide (PPSV-23) 2/8/2013 Tdap 2/14/2017 Zoster Vaccine, Live 6/1/2014 Not reviewed this visit You Were Diagnosed With   
  
 Codes Comments Acute pain in female pelvis    -  Primary ICD-10-CM: R10.2 ICD-9-CM: 625.9 Essential hypertension, benign     ICD-10-CM: I10 
ICD-9-CM: 401.1 Localized osteoporosis without current pathological fracture     ICD-10-CM: M81.6 ICD-9-CM: 733.09 Vitals BP Pulse Temp Resp Height(growth percentile) Weight(growth percentile) 146/69 (BP 1 Location: Left arm, BP Patient Position: Standing) 74 98.1 °F (36.7 °C) (Oral) 20 4' 10\" (1.473 m) 139 lb (63 kg) SpO2 BMI OB Status Smoking Status 97% 29.05 kg/m2 Hysterectomy Never Smoker Vitals History BMI and BSA Data Body Mass Index Body Surface Area 29.05 kg/m 2 1.61 m 2 Preferred Pharmacy Pharmacy Name Phone [address] [facility] AT [address] [place] [address]. [phone] Your Updated Medication List  
  
   
 This list is accurate as of: 8/31/17  5:00 PM.  Always use your most recent med list.  
  
  
  
  
 amitriptyline 10 mg tablet Commonly known as:  ELAVIL Take 1 tablet by mouth  nightly  
  
 aspirin 81 mg tablet Take  by mouth. atorvastatin 20 mg tablet Commonly known as:  LIPITOR Take 1 tablet by mouth  daily  
  
 calcium gluconate 650 mg Tab  
two (2) times a day. CENTRUM SILVER PO Take  by mouth. diclofenac 1 % Gel Commonly known as:  VOLTAREN Apply 1 g to affected area four (4) times daily. hydroCHLOROthiazide 12.5 mg tablet Commonly known as:  HYDRODIURIL Take 1 Tab by mouth daily. lisinopril 20 mg tablet Commonly known as:  PRINIVIL, ZESTRIL  
TAKE 1 AND 1/2 TABLETS BY MOUTH DAILY  
  
 nebivolol 5 mg tablet Commonly known as:  BYSTOLIC Take 1 tablet by mouth daily. VITAMIN D3 1,000 unit Cap Generic drug:  cholecalciferol Take 2,000 Units by mouth. To-Do List   
 08/31/2017 Imaging:  XR PELV 3 V Introducing [facility]! Dear Medhat Chacko: Thank you for requesting a [facility] account. Our records indicate that you already have an active [employer] account. You can access your account anytime at [email or web address] Did you know that you can access your hospital and ER discharge instructions at any time in [employer]? You can also review all of your test results from your hospital stay or ER visit. Additional Information If you have questions, please visit the Frequently Asked Questions section of the [employer] website at [email or web address] Remember, [employer] is NOT to be used for urgent needs. For medical emergencies, dial 911. Now available from your iPhone and Android! Please provide this summary of care documentation to your next provider. Your primary care clinician is listed as Linn Nash  If you have any questions after today's visit, please call [phone].

## 2017-08-31 NOTE — PROGRESS NOTES
Chief Complaint   Patient presents with    Groin Pain       Groin pain  Pt presents with history of 3 days of severe pain in her right hip area. She took ibuprofen 600 mg with some relief but when she does not take the pain will be 10/10 when she is bearing weight and walking. The pain starts in her groin area and pierces like a needle into her back. She denies knumbness or tingling. She is able to move her leg in directions but when turing pain again becomes severe. She denies trauma or heavy weight bearing other than her grandson. Subjective:   Tootie Marx is a 65 y.o. female with hypertension. Hypertension ROS: taking medications as instructed, no medication side effects noted, no TIA's, no chest pain on exertion, no dyspnea on exertion, no swelling of ankles. New concerns: none. Osteoporosis  Bone density reviewed with pt. She is not in favor with starting bisphosphonate  She is taking calcium and vitamin d.           Past Medical History:   Diagnosis Date    Essential hypertension, benign     Fibromyalgia     Myalgia and myositis, unspecified     Other and unspecified hyperlipidemia 10/12/2010    Palpitations 10/12/2010    Dr. Oliva Client Sleep apnea     Dr. 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,    Retired 10 years            3 children: overweight son, smoker son 48 and 46, Daughter healthy     Family History   Problem Relation Age of Onset    Diabetes Mother 61    Cancer Maternal Grandmother      colon ca    Diabetes Maternal Grandmother 61    Cancer Paternal Grandmother      intestinal ca    Heart Attack Paternal Grandmother     Stroke Paternal Grandmother     Heart Attack Paternal Grandfather     Stroke Paternal Grandfather      Current Outpatient Prescriptions   Medication Sig Dispense Refill    hydroCHLOROthiazide (HYDRODIURIL) 12.5 mg tablet Take 1 Tab by mouth daily. 90 Tab 0    lisinopril (PRINIVIL, ZESTRIL) 20 mg tablet TAKE 1 AND 1/2 TABLETS BY MOUTH DAILY 135 Tab 0    amitriptyline (ELAVIL) 10 mg tablet Take 1 tablet by mouth  nightly 90 Tab 0    atorvastatin (LIPITOR) 20 mg tablet Take 1 tablet by mouth  daily 90 Tab 0    nebivolol (BYSTOLIC) 5 mg tablet Take 1 tablet by mouth daily. 30 tablet 0    MULTIVITAMIN W-MINERALS/LUTEIN (CENTRUM SILVER PO) Take  by mouth.  Cholecalciferol, Vitamin D3, (VITAMIN D3) 1,000 unit cap Take 2,000 Units by mouth.  aspirin 81 mg tablet Take  by mouth.  diclofenac (VOLTAREN) 1 % topical gel Apply 1 g to affected area four (4) times daily. 20 g 1    calcium gluconate 650 mg Tab two (2) times a day.        Allergies   Allergen Reactions    Codeine Palpitations    Levofloxacin Other (comments)    Prednisone Palpitations    Vioxx [Rofecoxib] Palpitations       Review of Systems - General ROS: positive for  - sleep disturbance  negative for - fatigue, fever or hot flashes  Cardiovascular ROS: no chest pain or dyspnea on exertion  Respiratory ROS: no cough, shortness of breath, or wheezing    Visit Vitals    /69 (BP 1 Location: Left arm, BP Patient Position: Standing)    Pulse 74    Temp 98.1 °F (36.7 °C) (Oral)    Resp 20    Ht 4' 10\" (1.473 m)    Wt 139 lb (63 kg)    SpO2 97%    BMI 29.05 kg/m2     General Appearance:  Well developed, well nourished,alert and oriented x 3, and individual in no acute distress. Ears/Nose/Mouth/Throat:   Hearing grossly normal.         Neck: Supple, no lad, no bruits   Chest:   Lungs clear to auscultation bilaterally. Cardiovascular:  Regular rate and rhythm, S1, S2 normal, no murmur. Abdomen:   Soft, non-tender, bowel sounds are active. Extremities: No edema bilaterally. Left hip FROM, right hip FROM except for flexion and internal rotation of femur into pelvis   Skin: Warm and dry, no suspicious lesions  Gyn: no masses in pelvic vault, + pain on palpation of right >left superior pubic ramus                 Diagnoses and all orders for this visit:    1. Acute pain in female pelvis  I think she may have developed a fracture involving her pubic ramus. If so, will need rest and likely conservative treatment. Her hip WHO score was 3.5 % so she may have fractured. Will take calcium and vitamin D  I don't think she has osteonecrosis of the hip based on her physical exam  May need PT pelvis  -     XR PELV 3 V; Future    2. Essential hypertension, benign  Elevated due to pain and nsaids    3. Localized osteoporosis without current pathological fracture  Reviewed bmd with pt  Does not desire BF for now  Will get pelvic pain under control first.           I spent 25 min with this patient and >50% of the time was spent on counseling and management of pelvis pain, risk for osteoporosis, review of bmd.  She will follow up with me after xray of pelvis    This note will not be viewable in spigithart.

## 2017-09-01 ENCOUNTER — HOSPITAL ENCOUNTER (OUTPATIENT)
Dept: GENERAL RADIOLOGY | Age: 77
Discharge: HOME | End: 2017-09-01
Attending: INTERNAL MEDICINE
Payer: MEDICARE

## 2017-09-01 DIAGNOSIS — R10.2 ACUTE PAIN IN FEMALE PELVIS: ICD-10-CM

## 2017-09-01 PROCEDURE — 72170 X-RAY EXAM OF PELVIS: CPT

## 2017-09-03 DIAGNOSIS — M89.9 PUBIC BONE PAIN: Primary | ICD-10-CM

## 2017-09-19 ENCOUNTER — HOSPITAL ENCOUNTER (OUTPATIENT)
Dept: PHYSICAL THERAPY | Age: 77
Discharge: HOME | End: 2017-09-19
Payer: MEDICARE

## 2017-09-19 PROCEDURE — 97140 MANUAL THERAPY 1/> REGIONS: CPT | Performed by: PHYSICAL THERAPIST

## 2017-09-19 PROCEDURE — 97162 PT EVAL MOD COMPLEX 30 MIN: CPT | Performed by: PHYSICAL THERAPIST

## 2017-09-19 PROCEDURE — 97112 NEUROMUSCULAR REEDUCATION: CPT | Performed by: PHYSICAL THERAPIST

## 2017-09-19 NOTE — PROGRESS NOTES
[address] [facility] [address] [address]  Phone: [phone]  Fax: [phone]    Plan of Care/Statement of Necessity for Physical Therapy Services  2-15    Patient name: Jason Sandy  : 1940  Provider#: [id number]  Referral source: [facility] *      Medical/Treatment Diagnosis: Disorder of bone, unspecified [M89.9]  Pelvic pain [R10.2]     Prior Hospitalization: see medical history     Comorbidities: n/a  Prior Level of Function: independently lives at home, gardens, walks, yoga  Medications: Verified on Patient Summary List  Start of Care: 2017      Onset Date: 2017   The Plan of Care and following information is based on the information from the initial evaluation. 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 Her problem list includes weak hip and core musculature, poor postural alignment and awareness, pubic symphysis pain with palpation, decreased lumbar ROM, and decreased LE flexibility. She will benefit from skilled PT to address these deficits so that she can perform all of her home needs  including cooking, cleaning, gardening, and caring for grandchildren independently and without pain. Evaluation Complexity History MEDIUM  Complexity : 1-2 comorbidities / personal factors will impact the outcome/ POC ; Examination MEDIUM Complexity : 3 Standardized tests and measures addressing body structure, function, activity limitation and / or participation in recreation  ;Presentation MEDIUM Complexity : Evolving with changing characteristics  ; Clinical Decision Making MEDIUM Complexity : FOTO score of 26-74  Overall Complexity Rating: MEDIUM    Problem List: pain affecting function, decrease ROM, decrease strength, decrease ADL/ functional abilitiies, decrease activity tolerance, decrease flexibility/ joint mobility and decrease transfer abilities   Treatment Plan may include any combination of the following: Therapeutic exercise, Therapeutic activities, Neuromuscular re-education, Physical agent/modality, Gait/balance training, Manual therapy, Patient education, Functional mobility training and Home safety training  Patient / Family readiness to learn indicated by: asking questions, trying to perform skills and interest  Persons(s) to be included in education: patient (P)  Barriers to Learning/Limitations: None  Patient Goal (s): To return to all her activities without pain  Patient Self Reported Health Status: good  Rehabilitation Potential: good    Short Term Goals: To be accomplished in Short Term Goals: To be accomplished in 4 weeks:  Patient will be independent with a progressive home exercise program    Patient will demonstrate good compliance with progressing heel lift 2 hours each week   Patient will demonstrate good technique with squatting to decrease pain with gardening  Patient will demonstrate good understanding of TrA activation in multiple positions to reduce pain with sit<>stand transfers to <5/10    Long Term Goals: To be accomplished in 8 weeks:  Patient will demonstrate 4/5 TrA strength in order to eliminate pain with house work (cleaning)  Patient will have improved hamstring flexibility by 15 degrees so that she can stand and walk up to 15 minutes without pain  Patient will achieve 8+ hours a day of heel lift to decrease pubic rectal pain to < 3/10   Patient will demonstrate 5/5 hip and LE strength so that she can tolerate chair yoga, walking 1 mile without pain     Frequency / Duration: Patient to be seen 2 times per week for 8 weeks.     Patient/ Caregiver education and instruction: activity modification and exercises    [x]  Plan of care has been reviewed with KSENIA    G-Donn (GP)  Mobility   Current  CK= 40-59%      The severity rating is based on clinical judgment and the FOTO Score and Other PFDI score. Certification Period: 12/19/2017    Shaun White PT,DPT 9/19/2017 4:22 PM    ________________________________________________________________________    I certify that the above Therapy Services are being furnished while the patient is under my care. I agree with the treatment plan and certify that this therapy is necessary.     [de-identified] Signature:____________________  Date:____________Time: _________

## 2017-09-19 NOTE — PROGRESS NOTES
PT INITIAL EVALUATION NOTE - [facility] 2-15    Patient Name: Ernestina Curran  Date:2017  : 1940  [x]  Patient  Verified  Payor: [facility] MEDICARE / Plan: [address] / Product Type: Managed Care Medicare /    In time:230  Out time:330  Total Treatment Time (min): 60  Total Timed Codes (min): 60  1:1 Treatment Time ( [address] only): 60   Visit #: 1     Treatment Area: Disorder of bone, unspecified [M89.9]  Pelvic pain [R10.2]    SUBJECTIVE  Pain Level (0-10 scale): 10  Any medication changes, allergies to medications, adverse drug reactions, diagnosis change, or new procedure performed?: [] No    [x] Yes (see summary sheet for update)  Subjective:    Pateint reports that she moved from her home of 48 years to [facility], and it has been a very strenuous process. She has 2 steps to get into the house. She is currently living with her grandson, grandchildren and 2 dogs. About 4 weeks ago she started experiencing pain along the front of her pelvis in the bladder region and at the same time she started having sharp pain in her rectum. Patient reports that vacuuming and sit<>stand is the most aggravating activity. Lying down with feet up gives her the most relief. She reports that she has more pain when she lifts her grandchildren. No hx of colon pathology and is up to date on her colonoscopy. She underwent a Hysterectomy 20 years ago. She reports occasional and minimal leakage of urine, no report of constipation. She went to her doctor and an x-ray of the pelvis was performed. It showed that there is significant degeneration of the pubic symphysis and mild OA of the hips bilaterally. She is currently taking Aleve only when the pain is extreme because she is on BP medication.       PLOF: Lives independently, gardens, yoga, walks for exercise  Mechanism of Injury: unknown  Previous Treatment/Compliance: n/a  PMHx/Surgical Hx: Hysterectomy 20 years prior  Work Hx: retired  Living Situation: independently living in 1 story home. Temporarily her grandson and children are residing with her  Pt Goals: return to gardening and yoga  Barriers: none  Motivation: excellent  Substance use: none  FABQ Score: 60  Cognition: A & O x 4        OBJECTIVE/EXAMINATION  Posture:  Genu valgum, mild thoracic kyphosis  Other Observations:  Elevated right ASIS/PSIS  Gait and Functional Mobility:  No scoliosis noted with AROM. Leg length discrepancy noted R>L by 2 cm.   (Used landmarks ASIS to medial maleolus, tibial tuberosity to medial maleolus)  Palpation: Tenderness over pubic symphysis and noted elevation of right rami        Lumbar AROM:  Cervical AROM:        R  L  R  L  Flexion    WNL        Extension   WNL         Side Bending   50%   75%      Rotation   50%  75%        *Unable to reverse curve with right SB and noted hinging at T12-L1 junction      LOWER QUARTER   MUSCLE STRENGTH  KEY       R  L  0 - No Contraction  L1, L2 Psoas  4/5  4/5  1 - Trace   L3 Quads  5/5  5/5  2 - Poor   L4 Tib Ant  5/5  5/5  3 - Fair    L5 EHL  5/5  5/5  4 - Good   S1 FHL  5/5  5/5  5 - Normal   S2 Hams  5/5  5/5            MMT: Hip ER: 4/5 bilaterally   Hip IR: 4/5 Bilaterally   Abduction: 4-/5 Bilaterally   Adduction: 4/5 Bilaterally    Neurological: Reflexes / Sensations: WNL  Special Tests:    Trendelenberg: negative   Stork: +right SIJ   Forward Bend: negative   Slump: negative   Luther: tight psoas Tan   Casimiro: negative   H.S. SLR: negative    Piriformis Ext: negative        Compression/Distraction: negative   Hamstring 90/90: 60 degrees R, 55 degrees L           15 min Neuromuscular Re-education:  [x]  See flow sheet :   Rationale: increase ROM and increase strength  to improve the patients ability to Stabilize pelvic during ADL's (vacuuming, bending) to decrease pain    15 min Manual Therapy: MET (shotgun technique) to improve pelvic alignment and decrease pain    Rationale: decrease pain, increase ROM, increase tissue extensibility, decrease trigger points and increase postural awareness to improve the patients ability to maintain correct posture during sitting, standing, walking so that she can perform her housework without pain              With   [] TE   [] TA   [x] neuro   [] other: Patient Education: [x] Review HEP    [] Progressed/Changed HEP based on:   [] positioning   [] body mechanics   [] transfers   [] heat/ice application    [] other:          Pain Level (0-10 scale) post treatment: 2/10    ASSESSMENT/Changes in Function:     Patient is a 65year old female who presents to Physical Therapy with signs and symptoms of pubic symphysis DJD and pelvic floor dysfunction due to her pelvic alignment and a leg length discrepancy. Her problem list includes weak hip and core musculature, poor postural alignment and awareness, decreased lumbar ROM, and decreased LE flexibility. She will benefit from skilled PT to address these deficits so that she can perform all of her home needs  including cooking, cleaning, gardening, and caring for grandchildren independently and without pain.     [x]  See Plan of Care      Prashant Reed PT, DPT 9/19/2017  2:44 PM

## 2017-09-22 ENCOUNTER — HOSPITAL ENCOUNTER (OUTPATIENT)
Dept: PHYSICAL THERAPY | Age: 77
Discharge: HOME | End: 2017-09-22
Payer: MEDICARE

## 2017-09-22 PROCEDURE — 97140 MANUAL THERAPY 1/> REGIONS: CPT | Performed by: PHYSICAL THERAPIST

## 2017-09-22 PROCEDURE — 97530 THERAPEUTIC ACTIVITIES: CPT | Performed by: PHYSICAL THERAPIST

## 2017-09-22 PROCEDURE — 97110 THERAPEUTIC EXERCISES: CPT | Performed by: PHYSICAL THERAPIST

## 2017-09-22 PROCEDURE — 97112 NEUROMUSCULAR REEDUCATION: CPT | Performed by: PHYSICAL THERAPIST

## 2017-09-22 NOTE — PROGRESS NOTES
PT DAILY TREATMENT NOTE - [facility] -15    Patient Name: Reji Haas  Date:2017  : 1940  [x]  Patient  Verified  Payor: [facility] MEDICARE / Plan: [address] / Product Type: Managed Care Medicare /    In time:800  Out time:910  Total Treatment Time (min): 60  Total Timed Codes (min): 60  1:1 Treatment Time ( [address] only): 60   Visit #: 2     Treatment Area: Disorder of bone, unspecified [M89.9]  Pelvic pain [R10.2]    SUBJECTIVE  Pain Level (0-10 scale): 4/10  Any medication changes, allergies to medications, adverse drug reactions, diagnosis change, or new procedure performed?: [x] No    [] Yes (see summary sheet for update)  Subjective functional status/changes:   [] No changes reported  Patient reports that she likes the lift and wants to wear it all day because everything feels worse when she takes it off. She reports that she was working in her old house's [employer] yesterday and thinks her hip is hurting her more because of it.   OBJECTIVE    Modality rationale: decrease pain and increase tissue extensibility to improve the patients ability to bend and lift without pain   Min Type Additional Details    [] Estim: []Att   []Unatt        []TENS instruct                  []IFC  []Premod   []NMES                     []Other:  []w/US   []w/ice   []w/heat  Position:  Location:    []  Traction: [] Cervical       []Lumbar                       [] Prone          []Supine                       []Intermittent   []Continuous Lbs:  [] before manual  [] after manual  []w/heat    []  Ultrasound: []Continuous   [] Pulsed at:                           []1MHz   []3MHz Location:  W/cm2:    [] Paraffin         Location:   []w/heat    []  Ice     [x]  Heat  []  Ice massage Position:supine  Location: low back    []  Laser  []  Other: Position:  Location:      []  Vasopneumatic Device Pressure:       [] lo [] med [] hi   Temperature:      [x] Skin assessment post-treatment:  [x]intact [x]redness- no adverse reaction    []redness  adverse reaction:     15 min Therapeutic Exercise:  [x] See flow sheet :   Rationale: increase ROM and increase strength to improve the patients ability to sit and drive without pain    15 min Therapeutic Activity:  [x]  See flow sheet :   Rationale: increase ROM, increase strength and increase proprioception  to improve the patients ability to bend, lift and carry laundry and dishes in the home     15 min Neuromuscular Re-education:  [x]  See flow sheet :   Rationale: increase strength and improve coordination  to improve the patients ability to perform all self hygiene, and dressing without pain    15 min Manual Therapy: MET for pelvic alignment, STM and MFR to thoracolumbar ms    Rationale: decrease pain, increase ROM and increase tissue extensibility to improve the patients ability to walk and bend without pain so that she can               With   [x] TE   [x] TA   [] neuro   [] other: Patient Education: [x] Review HEP    [x] Progressed/Changed HEP based on:   [] positioning   [] body mechanics   [] transfers   [] heat/ice application    [] other:      Other Objective/Functional Measures: Patient wearing shoe lift and walking with improved gait and decreased hip hike on right     Pain Level (0-10 scale) post treatment: 2/10    ASSESSMENT/Changes in Function:     Patient tolerated treatment well today and reports less pain during her exercises with manual feedback to perform TrA correctly. Patient will continue to benefit from skilled PT services to modify and progress therapeutic interventions, address functional mobility deficits, address ROM deficits, address strength deficits, analyze and address soft tissue restrictions and analyze and modify body mechanics/ergonomics to attain remaining goals.      []  See Plan of Care  []  See progress note/recertification  []  See Discharge Summary             PLAN  [x]  Upgrade activities as tolerated     [x]  Continue plan of care  []  Update interventions per flow sheet       []  Discharge due to:_  []  Other:_      Bella Guajardo PT, DPT 9/22/2017  7:59 AM

## 2017-09-26 ENCOUNTER — HOSPITAL ENCOUNTER (OUTPATIENT)
Dept: PHYSICAL THERAPY | Age: 77
Discharge: HOME | End: 2017-09-26
Payer: MEDICARE

## 2017-09-26 PROCEDURE — 97112 NEUROMUSCULAR REEDUCATION: CPT | Performed by: PHYSICAL THERAPIST

## 2017-09-26 PROCEDURE — 97140 MANUAL THERAPY 1/> REGIONS: CPT | Performed by: PHYSICAL THERAPIST

## 2017-09-26 PROCEDURE — 97110 THERAPEUTIC EXERCISES: CPT | Performed by: PHYSICAL THERAPIST

## 2017-09-26 NOTE — PROGRESS NOTES
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 She has continued to wear her shoe lift because it gives her some releif. OBJECTIVE        15 min Therapeutic Exercise:  [x] See flow sheet :   Rationale: increase ROM, increase strength and improve coordination to improve the patients ability to walk and stand for short durations without pain       25 min Neuromuscular Re-education:  [x]  See flow sheet :   Rationale: increase ROM, increase strength, improve coordination and increase proprioception  to improve the patients ability to bend and lift while performed all ADL's without increased sx.     15 min Manual Therapy: MET correction of pelvic alignment and STM with thoracolumbar distraction    Rationale: decrease pain, increase ROM and increase tissue extensibility to improve the patients ability to perform light housework and navigate stairs without pain              With   [] TE   [] TA   [] neuro   [] other: Patient Education: [x] Review HEP    [x] Progressed/Changed HEP based on:   [] positioning   [] body mechanics   [] transfers   [] heat/ice application    [] other:           Pain Level (0-10 scale) post treatment: 2/10    ASSESSMENT/Changes in Function:     Patient tolerated progression of core stabilization Tx well today and demonstrates improved breathing coordination with core activation during exercises. She presents with improved pelvic alignment before and after treatment. She did not report increased tissue tightness or pain over the left piriformis and SIJ s/p manual treatment. Patient will continue to benefit from skilled PT services to modify and progress therapeutic interventions, address ROM deficits, address strength deficits, analyze and address soft tissue restrictions, analyze and cue movement patterns and assess and modify postural abnormalities to attain remaining goals.      []  See Plan of Care  []  See progress note/recertification  []  See Discharge Summary             PLAN  [x]  Upgrade activities as tolerated     []  Continue plan of care  []  Update interventions per flow sheet       []  Discharge due to:_  []  Other:_      Nyla Carbajal, PT, DPT 9/26/2017  9:12 AM

## 2017-09-29 ENCOUNTER — HOSPITAL ENCOUNTER (OUTPATIENT)
Dept: PHYSICAL THERAPY | Age: 77
Discharge: HOME | End: 2017-09-29
Payer: MEDICARE

## 2017-09-29 PROCEDURE — 97112 NEUROMUSCULAR REEDUCATION: CPT | Performed by: PHYSICAL THERAPIST

## 2017-09-29 PROCEDURE — 97110 THERAPEUTIC EXERCISES: CPT | Performed by: PHYSICAL THERAPIST

## 2017-09-29 PROCEDURE — 97140 MANUAL THERAPY 1/> REGIONS: CPT | Performed by: PHYSICAL THERAPIST

## 2017-09-29 NOTE — PROGRESS NOTES
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 She reports that getting in/out of the car and vacuuming are the most pain provocative activities.     OBJECTIVE    Modality rationale: decrease pain and increase tissue extensibility to improve the patients ability to bend to floor to lift purse without pain   Min Type Additional Details    [] Estim: []Att   []Unatt        []TENS instruct                  []IFC  []Premod   []NMES                     []Other:  []w/US   []w/ice   []w/heat  Position:  Location:    []  Traction: [] Cervical       []Lumbar                       [] Prone          []Supine                       []Intermittent   []Continuous Lbs:  [] before manual  [] after manual  []w/heat    []  Ultrasound: []Continuous   [] Pulsed at:                           []1MHz   []3MHz Location:  W/cm2:    [] Paraffin         Location:   []w/heat    []  Ice     [x]  Heat  []  Ice massage Position:supine  Location:low back    []  Laser  []  Other: Position:  Location:      []  Vasopneumatic Device Pressure:       [] lo [] med [] hi   Temperature:      [x] Skin assessment post-treatment:  [x]intact [x]redness- no adverse reaction []redness  adverse reaction:     15 min Therapeutic Exercise:  [x] See flow sheet :   Rationale: increase ROM and increase strength to improve the patients ability to bend to floor to dress without pain       25 min Neuromuscular Re-education:  [x]  See flow sheet :   Rationale: increase ROM, increase strength and increase proprioception  to improve the patients ability to stabilize core/pelvic girdle for painfree transfers, standing adl's that involve push/pull or light lifting (vacuuming)    15 min Manual Therapy: MET to correct pelvic alignment, piriformis contract/relax with TPR and manual stretching    Rationale: decrease pain, increase ROM, increase tissue extensibility and increase postural awareness to improve the patients ability to walk around her house to clean without increased hip pain              With   [] TE   [] TA   [] neuro   [] other: Patient Education: [x] Review HEP    [] Progressed/Changed HEP based on:   [] positioning   [] body mechanics   [] transfers   [] heat/ice application    [] other:      Other Objective/Functional Measures: presents with pain and tissue tightness over the right piriformis muscle prior to tx     Pain Level (0-10 scale) post treatment: 1/10    ASSESSMENT/Changes in Function:   Patient was educated on how to perform ADL's and transfers while using SIJ protection techniques to prevent pain. Patient will continue to benefit from skilled PT services to modify and progress therapeutic interventions, address functional mobility deficits, address ROM deficits, address strength deficits, analyze and address soft tissue restrictions, analyze and modify body mechanics/ergonomics and assess and modify postural abnormalities to attain remaining goals.      []  See Plan of Care  []  See progress note/recertification  []  See Discharge Summary         PLAN  [x]  Upgrade activities as tolerated     [x]  Continue plan of care  []  Update interventions per flow sheet       [] Discharge due to:_  []  Other:_      Nely Land PT, DPT 9/29/2017  10:07 AM

## 2017-10-03 ENCOUNTER — HOSPITAL ENCOUNTER (OUTPATIENT)
Dept: PHYSICAL THERAPY | Age: 77
Discharge: HOME | End: 2017-10-03
Payer: MEDICARE

## 2017-10-03 DIAGNOSIS — M79.7 FIBROMYALGIA: ICD-10-CM

## 2017-10-03 PROCEDURE — 97112 NEUROMUSCULAR REEDUCATION: CPT | Performed by: PHYSICAL THERAPIST

## 2017-10-03 PROCEDURE — 97110 THERAPEUTIC EXERCISES: CPT | Performed by: PHYSICAL THERAPIST

## 2017-10-03 PROCEDURE — 97140 MANUAL THERAPY 1/> REGIONS: CPT | Performed by: PHYSICAL THERAPIST

## 2017-10-03 PROCEDURE — 97530 THERAPEUTIC ACTIVITIES: CPT | Performed by: PHYSICAL THERAPIST

## 2017-10-03 RX ORDER — AMITRIPTYLINE HYDROCHLORIDE 10 MG/1
10 TABLET, FILM COATED ORAL
Qty: 90 TAB | Refills: 0 | Status: SHIPPED | OUTPATIENT
Start: 2017-10-03 | End: 2018-03-14 | Stop reason: SDUPTHER

## 2017-10-03 NOTE — PROGRESS NOTES
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   She has been doing flights of stairs multiple time this morning to help movers get furniture out of her house    OBJECTIVE    Modality rationale: decrease pain and increase tissue extensibility to improve the patients ability to bend over to dress without pain   Min Type Additional Details    [] Estim: []Att   []Unatt        []TENS instruct                  []IFC  []Premod   []NMES                     []Other:  []w/US   []w/ice   []w/heat  Position:  Location:    []  Traction: [] Cervical       []Lumbar                       [] Prone          []Supine                       []Intermittent   []Continuous Lbs:  [] before manual  [] after manual  []w/heat    []  Ultrasound: []Continuous   [] Pulsed at:                           []1MHz   []3MHz Location:  W/cm2:    [] Paraffin         Location:   []w/heat    []  Ice     [x]  Heat  []  Ice massage Position:supine  Location:low back and hip    []  Laser  []  Other: Position:  Location:      []  Vasopneumatic Device Pressure:       [] lo [] med [] hi   Temperature:      [x] Skin assessment post-treatment:  [x]intact [x]redness- no adverse reaction    []redness  adverse reaction:     15 min Therapeutic Exercise:  [x] See flow sheet :   Rationale: increase ROM and increase strength to improve the patients ability to walk around the home for light housework     15 min Therapeutic Activity:  [x]  See flow sheet :   Rationale: increase ROM, increase strength, improve coordination, improve balance and increase proprioception  to improve the patients ability to lift and reach away from her body to perform cooking/cleaning without pain     15 min Neuromuscular Re-education:  [x]  See flow sheet :   Rationale: increase strength, improve coordination and increase proprioception  to improve the patients ability to tolerate prolonged sitting and standing to drive and perform personal errands    15 min Manual Therapy: MET for pelvic alignment, pelvic rocking, and QL distraction with contract/relax    Rationale: decrease pain, increase tissue extensibility and increase postural awareness to improve the patients ability to navigate stairs and lunging to the ground to  personal items without pain              With   [] TE   [] TA   [] neuro   [] other: Patient Education: [x] Review HEP    [] Progressed/Changed HEP based on:   [] positioning   [] body mechanics   [] transfers   [] heat/ice application    [] other:      Other Objective/Functional Measures: Significant tissue tightness and pain with palpation over left QL     Pain Level (0-10 scale) post treatment: 1/10    ASSESSMENT/Changes in Function:     Patient will continue to benefit from skilled PT services to modify and progress therapeutic interventions, address functional mobility deficits, address ROM deficits, address strength deficits, analyze and address soft tissue restrictions and assess and modify postural abnormalities to attain remaining goals.      []  See Plan of Care  []  See progress note/recertification  []  See Discharge Summary         Progress towards goals / Updated goals:  Modified and progressed written HEP    PLAN  [x]  Upgrade activities as tolerated     []  Continue plan of care  []  Update interventions per flow sheet       []  Discharge due to:_  []  Other:_      Sarah Mehta PT, DPT 10/3/2017  12:43 PM

## 2017-10-06 ENCOUNTER — APPOINTMENT (OUTPATIENT)
Dept: PHYSICAL THERAPY | Age: 77
End: 2017-10-06
Payer: MEDICARE

## 2017-10-10 ENCOUNTER — HOSPITAL ENCOUNTER (OUTPATIENT)
Dept: PHYSICAL THERAPY | Age: 77
Discharge: HOME | End: 2017-10-10
Payer: MEDICARE

## 2017-10-10 PROCEDURE — 97112 NEUROMUSCULAR REEDUCATION: CPT | Performed by: PHYSICAL THERAPIST

## 2017-10-10 PROCEDURE — 97140 MANUAL THERAPY 1/> REGIONS: CPT | Performed by: PHYSICAL THERAPIST

## 2017-10-10 PROCEDURE — 97110 THERAPEUTIC EXERCISES: CPT | Performed by: PHYSICAL THERAPIST

## 2017-10-10 NOTE — PROGRESS NOTES
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 She has been on her hands and knees for hours cleaning the floor at her house.     OBJECTIVE    Modality rationale: decrease pain and increase tissue extensibility to improve the patients ability to bend to floor to dress   Min Type Additional Details    [] Estim: []Att   []Unatt        []TENS instruct                  []IFC  []Premod   []NMES                     []Other:  []w/US   []w/ice   []w/heat  Position:  Location:    []  Traction: [] Cervical       []Lumbar                       [] Prone          []Supine                       []Intermittent   []Continuous Lbs:  [] before manual  [] after manual  []w/heat    []  Ultrasound: []Continuous   [] Pulsed at:                            []1MHz   []3MHz Location:  W/cm2:    []  Paraffin         Location:  []w/heat    []  Ice     [x]  Heat  []  Ice massage Position:supine  Location:low back    []  Laser  []  Other: Position:  Location:    []  Vasopneumatic Device Pressure:       [] lo [] med [] hi   Temperature:    [x] Skin assessment post-treatment:  [x]intact []redness- no adverse reaction    []redness  adverse reaction:     15 min Therapeutic Exercise:  [x] See flow sheet :   Rationale: increase ROM and increase strength to improve the patients ability to kneel to floor to clean       25 min Neuromuscular Re-education:  [x]  See flow sheet :   Rationale: increase ROM, increase strength, improve coordination and increase proprioception  to improve the patients ability to lift light objects while cooking and vacuum without pain    15 min Manual Therapy:  Piriformis contract relax, MFR and TPR to gluteals, piriformis and TFL   Rationale: decrease pain, increase ROM and increase tissue extensibility  to improve the patients ability to transfer sit<>stand without pain              With   [] TE   [] TA   [x] neuro   [] other: Patient Education: [x] Review HEP    [] Progressed/Changed HEP based on:   [] positioning   [] body mechanics   [] transfers   [] heat/ice application    [] other:      Other Objective/Functional Measures: Tissue tenderness and tightness over right piriformis     Pain Level (0-10 scale) post treatment: 0/10    ASSESSMENT/Changes in Function:     Patient tolerated her exercises well today and did not present with pain over the right greater trochanter s/p manual treatment. She was able to progress her exercises without increased pain. Patient will continue to benefit from skilled PT services to modify and progress therapeutic interventions, address ROM deficits, address strength deficits, analyze and address soft tissue restrictions and analyze and modify body mechanics/ergonomics to attain remaining goals.      []  See Plan of Care  []  See progress note/recertification  []  See Discharge Summary         Progress towards goals / Updated goals:      PLAN  [x]  Upgrade activities as tolerated     []  Continue plan of care  []  Update interventions per flow sheet       []  Discharge due to:_  []  Other:_      Isabel Sosa PT, DPT 10/10/2017  1:31 PM

## 2017-10-13 ENCOUNTER — HOSPITAL ENCOUNTER (OUTPATIENT)
Dept: PHYSICAL THERAPY | Age: 77
Discharge: HOME | End: 2017-10-13
Payer: MEDICARE

## 2017-10-13 PROCEDURE — 97530 THERAPEUTIC ACTIVITIES: CPT | Performed by: PHYSICAL THERAPIST

## 2017-10-13 PROCEDURE — 97140 MANUAL THERAPY 1/> REGIONS: CPT | Performed by: PHYSICAL THERAPIST

## 2017-10-13 PROCEDURE — 97112 NEUROMUSCULAR REEDUCATION: CPT | Performed by: PHYSICAL THERAPIST

## 2017-10-13 NOTE — PROGRESS NOTES
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]  Pelvic pain [R10.2]    SUBJECTIVE  Pain Level (0-10 scale): 0/10  Any medication changes, allergies to medications, adverse drug reactions, diagnosis change, or new procedure performed?: [x] No    [] Yes (see summary sheet for update)  Subjective functional status/changes:   [] No changes reported  Patient reports that she is feeling much better and feels like she is confident enough to start thinking about d/c    OBJECTIVE    Modality rationale: decrease pain and increase tissue extensibility to improve the patients ability to bend to tie shoes   Min Type Additional Details    [] Estim: []Att   []Unatt        []TENS instruct                  []IFC  []Premod   []NMES                     []Other:  []w/US   []w/ice   []w/heat  Position:supin  Location:    []  Traction: [] Cervical       []Lumbar                       [] Prone          []Supine                       []Intermittent   []Continuous Lbs:  [] before manual  [] after manual  []w/heat    []  Ultrasound: []Continuous   [] Pulsed at:                           []1MHz   []3MHz Location:  W/cm2:    [] Paraffin         Location:   []w/heat    []  Ice     [x]  Heat  []  Ice massage Position:supine  Location:low back    []  Laser  []  Other: Position:  Location:      []  Vasopneumatic Device Pressure:       [] lo [] med [] hi   Temperature:      [x] Skin assessment post-treatment:  [x]intact [x]redness- no adverse reaction    []redness  adverse reaction:       30 min Therapeutic Activity:  []  See flow sheet : Rationale: increase ROM, increase strength, improve coordination and increase proprioception  to improve the patients ability to lift light objects to clean and cook independently      15 min Neuromuscular Re-education:  []  See flow sheet :   Rationale: increase ROM, increase strength, improve coordination and increase proprioception  to improve the patients ability to tolerate sitting or standing to dress     15 min Manual Therapy: stm to QL and contract relax to piriformis with TPR    Rationale: decrease pain and increase postural awareness to improve the patients ability to walk up down stairs without pain              With   [] TE   [] TA   [] neuro   [] other: Patient Education: [x] Review HEP    [] Progressed/Changed HEP based on:   [] positioning   [] body mechanics   [] transfers   [] heat/ice application    [] other:      Other Objective/Functional Measures: advanced exercises for HEP     Pain Level (0-10 scale) post treatment: 0/10    ASSESSMENT/Changes in Function:     Patient will continue to benefit from skilled PT services to modify and progress therapeutic interventions, address functional mobility deficits, address ROM deficits, address strength deficits and analyze and modify body mechanics/ergonomics to attain remaining goals.      []  See Plan of Care  []  See progress note/recertification  []  See Discharge Summary         Progress towards goals / Updated goals:      PLAN  []  Upgrade activities as tolerated     [x]  Continue plan of care  []  Update interventions per flow sheet       []  Discharge due to:_  [x]  Other:_progress to d/c next week      Cira Marinelli PT, DPT 10/13/2017  1:04 PM

## 2017-10-17 ENCOUNTER — APPOINTMENT (OUTPATIENT)
Dept: PHYSICAL THERAPY | Age: 77
End: 2017-10-17
Payer: MEDICARE

## 2017-10-20 ENCOUNTER — HOSPITAL ENCOUNTER (OUTPATIENT)
Dept: PHYSICAL THERAPY | Age: 77
Discharge: HOME | End: 2017-10-20
Payer: MEDICARE

## 2017-10-20 PROCEDURE — 97112 NEUROMUSCULAR REEDUCATION: CPT | Performed by: PHYSICAL THERAPIST

## 2017-10-20 PROCEDURE — 97530 THERAPEUTIC ACTIVITIES: CPT | Performed by: PHYSICAL THERAPIST

## 2017-10-20 PROCEDURE — 97110 THERAPEUTIC EXERCISES: CPT | Performed by: PHYSICAL THERAPIST

## 2017-10-20 PROCEDURE — 97140 MANUAL THERAPY 1/> REGIONS: CPT | Performed by: PHYSICAL THERAPIST

## 2017-10-20 NOTE — PROGRESS NOTES
PT DAILY TREATMENT NOTE - [facility] 2-15    Patient Name: Moody Area  Date:10/20/2017  : 1940  [x]  Patient  Verified  Payor: [facility] MEDICARE / Plan: [address] / Product Type: Managed Care Medicare /    In time:1100  Out time:1200  Total Treatment Time (min): 60  Total Timed Codes (min): 60  1:1 Treatment Time ( only): 60   Visit #: 8     Treatment Area: Pain in right hip [M25.551]  Pain in left hip [M25.552]    SUBJECTIVE  Pain Level (0-10 scale): 6/10  Any medication changes, allergies to medications, adverse drug reactions, diagnosis change, or new procedure performed?: [x] No    [] Yes (see summary sheet for update)  Subjective functional status/changes:   [] No changes reported  Patient reports that she is in more pain today but she knows that it was because she was on her feet lifting and carrying objects all day yesterday for an event with her garden [employer]    OBJECTIVE    Modality rationale: decrease pain and increase tissue extensibility to improve the patients ability to bend to floor to dress   Min Type Additional Details    [] Estim: []Att   []Unatt        []TENS instruct                  []IFC  []Premod   []NMES                     []Other:  []w/US   []w/ice   []w/heat  Position:  Location:    []  Traction: [] Cervical       []Lumbar                       [] Prone          []Supine                       []Intermittent   []Continuous Lbs:  [] before manual  [] after manual  []w/heat    []  Ultrasound: []Continuous   [] Pulsed at:                           []1MHz   []3MHz Location:  W/cm2:    [] Paraffin         Location:   []w/heat    []  Ice     [x]  Heat  []  Ice massage Position:supine  Location:low back and hip    []  Laser  []  Other: Position:  Location:      []  Vasopneumatic Device Pressure:       [] lo [] med [] hi   Temperature:      [x] Skin assessment post-treatment:  [x]intact []redness- no adverse reaction    []redness  adverse reaction:     15 min Therapeutic Exercise:  [x] See flow sheet :   Rationale: increase ROM and increase strength to improve the patients ability to bend to floor to reach shoes    15 min Therapeutic Activity:  [x]  See flow sheet :   Rationale: increase strength, improve coordination, improve balance and increase proprioception  to improve the patients ability to safely transfers in/out of chair or car     15 min Neuromuscular Re-education:  [x]  See flow sheet :   Rationale: increase ROM, increase strength, improve coordination and increase proprioception  to improve the patients ability to tolerated standing to cook and clean her house independently    15 min Manual Therapy: Piriformis stretch, TPR, contract relax    Rationale: decrease pain, increase ROM and increase tissue extensibility to improve the patients ability to walk and negotiate stairs without pain              With   [] TE   [] TA   [] neuro   [] other: Patient Education: [x] Review HEP    [] Progressed/Changed HEP based on:   [] positioning   [] body mechanics   [] transfers   [] heat/ice application    [] other:      Other Objective/Functional Measures: pain and tissue tenderness over right piriformis     Pain Level (0-10 scale) post treatment: 2/10    ASSESSMENT/Changes in Function:     Patient will continue to benefit from skilled PT services to modify and progress therapeutic interventions, address functional mobility deficits, address ROM deficits, address strength deficits and analyze and modify body mechanics/ergonomics to attain remaining goals.      []  See Plan of Care  []  See progress note/recertification  []  See Discharge Summary         Progress towards goals / Updated goals:  Patient is making excellent progress towards her pain goals and HEP    PLAN  []  Upgrade activities as tolerated     [x]  Continue plan of care  []  Update interventions per flow sheet       []  Discharge due to:_  []  Other:_      Lela Gillespie PT,KENANT 10/20/2017  11:34 AM

## 2017-10-25 ENCOUNTER — HOSPITAL ENCOUNTER (OUTPATIENT)
Dept: PHYSICAL THERAPY | Age: 77
Discharge: HOME | End: 2017-10-25
Payer: MEDICARE

## 2017-10-25 PROCEDURE — 97110 THERAPEUTIC EXERCISES: CPT | Performed by: PHYSICAL THERAPIST

## 2017-10-25 PROCEDURE — 97140 MANUAL THERAPY 1/> REGIONS: CPT | Performed by: PHYSICAL THERAPIST

## 2017-10-25 PROCEDURE — 97530 THERAPEUTIC ACTIVITIES: CPT | Performed by: PHYSICAL THERAPIST

## 2017-10-25 NOTE — PROGRESS NOTES
PT DAILY TREATMENT NOTE - [facility] 2-15    Patient Name: Ashley Benton  Date:10/25/2017  : 1940  [x]  Patient  Verified  Payor: [facility] MEDICARE / Plan: [place] / Product Type: Managed Care Medicare /    In time:100  Out time:200  Total Treatment Time (min): 60  Total Timed Codes (min): 60  1:1 Treatment Time ( [address] only): 60   Visit #: 9     Treatment Area: Pain in right hip [M25.551]  Pain in left hip [M25.552]    SUBJECTIVE  Pain Level (0-10 scale): 2/10  Any medication changes, allergies to medications, adverse drug reactions, diagnosis change, or new procedure performed?: [x] No    [] Yes (see summary sheet for update)  Subjective functional status/changes:   [] No changes reported  Patient reports that she has more pain in the beginning of the day, and she has noticed that it eases up the more she moves and walks around    OBJECTIVE    Modality rationale: decrease pain and increase tissue extensibility to improve the patients ability to bend over to tie shoes   Min Type Additional Details    [] Estim: []Att   []Unatt        []TENS instruct                  []IFC  []Premod   []NMES                     []Other:  []w/US   []w/ice   []w/heat  Position:  Location:    []  Traction: [] Cervical       []Lumbar                       [] Prone          []Supine                       []Intermittent   []Continuous Lbs:  [] before manual  [] after manual  []w/heat    []  Ultrasound: []Continuous   [] Pulsed at:                           []1MHz   []3MHz Location:  W/cm2:    [] Paraffin         Location:   []w/heat    []  Ice     [x]  Heat  []  Ice massage Position:supine  Location:low back    []  Laser  []  Other: Position:  Location:      []  Vasopneumatic Device Pressure:       [] lo [] med [] hi   Temperature:      [x] Skin assessment post-treatment:  [x]intact [x]redness- no adverse reaction    []redness  adverse reaction:     15 min Therapeutic Exercise:  [] See flow sheet :   Rationale: increase ROM and increase strength to improve the patients ability to walk up/down stairs and bend over without fall risk    30 min Therapeutic Activity:  [x]  See flow sheet :   Rationale: increase ROM, increase strength, improve coordination and increase proprioception  to improve the patients ability to transfer in/out bed, chair, car, etc without falling        15 min Manual Therapy: QL, multifidi scrubbing, TPR to piriformis    Rationale: decrease pain and increase tissue extensibility to improve the patients ability to navigate stairs and uneven surfaces without pain              With   [] TE   [x] TA   [] neuro   [] other: Patient Education: [x] Review HEP    [] Progressed/Changed HEP based on:   [] positioning   [] body mechanics   [] transfers   [] heat/ice application    [] other:      Other Objective/Functional Measures: pain and tissue tenderness in right piriformis     Pain Level (0-10 scale) post treatment: 01/0    ASSESSMENT/Changes in Function:     Patient will continue to benefit from skilled PT services to modify and progress therapeutic interventions, address functional mobility deficits, address ROM deficits, address strength deficits and analyze and address soft tissue restrictions to attain remaining goals.      []  See Plan of Care  []  See progress note/recertification  []  See Discharge Summary         Progress towards goals / Updated goals:  Making excellent progress towards all goals, will re-evaluate and d/c next visit    PLAN  []  Upgrade activities as tolerated     [x]  Continue plan of care  []  Update interventions per flow sheet       []  Discharge due to:_  []  Other:_      Rinku Mondragon PT,DPT 10/25/2017  1:02 PM

## 2017-10-27 ENCOUNTER — HOSPITAL ENCOUNTER (OUTPATIENT)
Dept: PHYSICAL THERAPY | Age: 77
Discharge: HOME | End: 2017-10-27
Payer: MEDICARE

## 2017-10-27 PROCEDURE — G8979 MOBILITY GOAL STATUS: HCPCS | Performed by: PHYSICAL THERAPIST

## 2017-10-27 PROCEDURE — G8978 MOBILITY CURRENT STATUS: HCPCS | Performed by: PHYSICAL THERAPIST

## 2017-10-27 PROCEDURE — G8980 MOBILITY D/C STATUS: HCPCS | Performed by: PHYSICAL THERAPIST

## 2017-10-27 PROCEDURE — 97110 THERAPEUTIC EXERCISES: CPT | Performed by: PHYSICAL THERAPIST

## 2017-10-27 PROCEDURE — 97140 MANUAL THERAPY 1/> REGIONS: CPT | Performed by: PHYSICAL THERAPIST

## 2017-10-27 PROCEDURE — 97530 THERAPEUTIC ACTIVITIES: CPT | Performed by: PHYSICAL THERAPIST

## 2017-10-27 NOTE — PROGRESS NOTES
[address] [facility] [place], [facility] [place]  Phone: [phone] Fax: [phone]      Discharge Summary 2-15    Patient name: Caitlin Paet  : 1940  Provider#: [id number]  Referral source: Will Vinson *      Medical/Treatment Diagnosis: Pain in right hip [M25.551]  Pain in left hip [M25.552]     Prior Hospitalization: see medical history     Comorbidities: See Plan of Care  Prior Level of Function: See Plan of Care  Medications: Verified on Patient Summary List    Start of Care: 2017      Onset Date:2017   Visits from Start of Care: 10     Missed Visits: 0  Reporting Period : 2017 to 10/27/17    Assessment/Summary of care: Patient has met all subjective goals at this time and is independent with all personal care and household ADL's. She was provided with thorough HEP to advance her strength upon discharge. Goal:    Short Term Goals:  To be accomplished in [address] [facility] be accomplished in 4 weeks:  Patient will be independent with a progressive home exercise program  --met  Patient will demonstrate good compliance with progressing heel lift 2 hours each week met  Patient will demonstrate good technique with squatting to decrease pain with gardening  met  Patient will demonstrate good understanding of TrA activation in multiple positions to reduce pain with sit<>stand transfers to <5/10 met     Long Term Goals: To be accomplished in 8 weeks:  Patient will demonstrate 4/5 TrA strength in order to eliminate pain with house work (cleaning) met  Patient will have improved hamstring flexibility by 15 degrees so that she can stand and walk up to 15 minutes without pain met  Patient will achieve 8+ hours a day of heel lift to decrease pubic rectal pain to < 3/10  met  Patient will demonstrate 5/5 hip and LE strength so that she can tolerate chair yoga, walking 1 mile without pain met  G-Codes (GP)  Mobility  [id number] Current CK= 40-59%  D/C  CK= 40-59%, Goal CJ=20-39%    The severity rating is based on clinical judgment and the FOTO Score score.     RECOMMENDATIONS:  [x]Discontinue therapy: [x]Patient has reached or is progressing toward set goals     []Patient is non-compliant or has abdicated     []Due to lack of appreciable progress towards set goals     []Other  Angela Naidu, PT,DPT 10/27/2017 1:52 PM

## 2017-10-27 NOTE — PROGRESS NOTES
PT DAILY TREATMENT NOTE - [facility] 2-15    Patient Name: Halima Beth  Date:10/27/2017  : 1940  [x]  Patient  Verified  Payor: [facility] MEDICARE / Plan: [address] / Product Type: Managed Care Medicare /    In time:1100  Out time:1200  Total Treatment Time (min): 60  Total Timed Codes (min): 60  1:1 Treatment Time ( [address] only): 60   Visit #: 10     Treatment Area: Pain in right hip [M25.551]  Pain in left hip [M25.552]    SUBJECTIVE  Pain Level (0-10 scale): 4/10  Any medication changes, allergies to medications, adverse drug reactions, diagnosis change, or new procedure performed?: [x] No    [] Yes (see summary sheet for update)  Subjective functional status/changes:   [] No changes reported  Patient reports that overall my pain is much better and I feel a lot of relief after I do my exercises.   I will keep up with my home program    OBJECTIVE        15 min Therapeutic Exercise:  [x] See flow sheet :   Rationale: increase ROM, increase strength, improve coordination and improve balance to improve the patients ability to bend to floor to dress    30 min Therapeutic Activity:  [x]  See flow sheet :   Rationale: increase ROM, increase strength, improve coordination and increase proprioception  to improve the patients ability to lift light loads to perform all house cleaning/cooking independently       15 min Manual Therapy: pelvic rocking to gap L5/SI, piriformis TPR, thoracolumbar distraction    Rationale: decrease pain, increase ROM and increase tissue extensibility to improve the patients ability to transfer in/out bed and chairs without pain              With   [] TE   [] TA   [] neuro   [] other: Patient Education: [x] Review HEP    [] Progressed/Changed HEP based on:   [] positioning   [] body mechanics   [] transfers   [] heat/ice application    [] other:      Other Objective/Functional Measures:       Posture:  Genu valgum, mild thoracic kyphosis  Other Observations:  levelASIS/PSIS  Gait and Functional Mobility:  No scoliosis noted with AROM. Leg length discrepancy noted R>L by 2 cm.   (Used landmarks ASIS to medial maleolus, tibial tuberosity to medial maleolus)  Palpation: Tenderness over pubic symphysis and noted elevation of right rami                                                                                             Lumbar AROM:                                         Cervical AROM:                                                                                                                                   R                                          L                                          R                                          L  Flexion                                                                             WNL                                                                                                                              Extension                                                              WNL                                                                                                                              Side Bending                                                         75%                                     75%                                                                                   Rotation                                                                 75%                                     75%                                                                                      *Unable to reverse curve with right SB and noted hinging at T12-L1 junction        LOWER QUARTER                                                          MUSCLE STRENGTH  KEY                                                                                                                                  R                                          L  0 - No Contraction                                              L1, L2 Psoas                                   5/5                                       5/5  1 - Trace                                                                L3 Quads                                        5/5                                       5/5  2 - Poor                                                                 L4 Tib Ant                                        5/5 5/5  3 - Fair                                                                   L5 EHL                                            5/5 5/5  4 - Good                                                                S1 FHL                                            5/5 5/5  5 - Normal                                                             S2 Hams                                         5/5                                       5/5            MMT: Hip ER: 5/5 bilaterally                        Hip IR: 5/5 Bilaterally                        Abduction: 5/5 Bilaterally                        Adduction: 5/5 Bilaterally     Neurological: Reflexes / Sensations: WNL  Special Tests:                         Trendelenberg: negative                                                               Stork: +right SIJ                        Forward Bend: negative                                                               Slump: negative                        Luther: tight psoas Tan                                                                Casimiro: negative                        H.S. SLR: negative                                                                                 Piriformis Ext: negative                                                                                                                                     Compression/Distraction: negative                        Hamstring 90/90: 30 degrees R, 30 degrees L       Pain Level (0-10 scale) post treatment: 0/10    ASSESSMENT/Changes in Function:       []  See Plan of Care  []  See progress note/recertification  [x]  See Discharge Summary      PLAN  []  Upgrade activities as tolerated     []  Continue plan of care  []  Update interventions per flow sheet       [x]  Discharge due to:_completed personal goals  []  Other:_      Morgan Catalan PT,DPT 10/27/2017  11:24 AM

## 2017-10-31 ENCOUNTER — APPOINTMENT (OUTPATIENT)
Dept: PHYSICAL THERAPY | Age: 77
End: 2017-10-31
Payer: MEDICARE

## 2017-11-03 ENCOUNTER — APPOINTMENT (OUTPATIENT)
Dept: PHYSICAL THERAPY | Age: 77
End: 2017-11-03

## 2017-11-08 DIAGNOSIS — I10 BENIGN ESSENTIAL HTN: Primary | ICD-10-CM

## 2017-11-18 LAB
ALBUMIN SERPL-MCNC: 4.3 G/DL (ref 3.5–4.8)
ALBUMIN/GLOB SERPL: 1.9 {RATIO} (ref 1.2–2.2)
ALP SERPL-CCNC: 62 IU/L (ref 39–117)
ALT SERPL-CCNC: 21 IU/L (ref 0–32)
AST SERPL-CCNC: 21 IU/L (ref 0–40)
BILIRUB SERPL-MCNC: 0.4 MG/DL (ref 0–1.2)
BUN SERPL-MCNC: 18 MG/DL (ref 8–27)
BUN/CREAT SERPL: 23 (ref 12–28)
CALCIUM SERPL-MCNC: 9.2 MG/DL (ref 8.7–10.3)
CHLORIDE SERPL-SCNC: 99 MMOL/L (ref 96–106)
CO2 SERPL-SCNC: 26 MMOL/L (ref 18–29)
CREAT SERPL-MCNC: 0.78 MG/DL (ref 0.57–1)
GFR SERPLBLD CREATININE-BSD FMLA CKD-EPI: 73 ML/MIN/1.73
GFR SERPLBLD CREATININE-BSD FMLA CKD-EPI: 84 ML/MIN/1.73
GLOBULIN SER CALC-MCNC: 2.3 G/DL (ref 1.5–4.5)
GLUCOSE SERPL-MCNC: 140 MG/DL (ref 65–99)
POTASSIUM SERPL-SCNC: 4.2 MMOL/L (ref 3.5–5.2)
PROT SERPL-MCNC: 6.6 G/DL (ref 6–8.5)
SODIUM SERPL-SCNC: 141 MMOL/L (ref 134–144)

## 2017-11-19 DIAGNOSIS — R73.02 GLUCOSE INTOLERANCE (IMPAIRED GLUCOSE TOLERANCE): Primary | ICD-10-CM

## 2018-02-06 ENCOUNTER — HOSPITAL ENCOUNTER (EMERGENCY)
Age: 78
Discharge: HOME | End: 2018-02-06
Attending: EMERGENCY MEDICINE
Payer: MEDICARE

## 2018-02-06 VITALS
HEIGHT: 58 IN | BODY MASS INDEX: 28.09 KG/M2 | SYSTOLIC BLOOD PRESSURE: 132 MMHG | RESPIRATION RATE: 14 BRPM | DIASTOLIC BLOOD PRESSURE: 65 MMHG | OXYGEN SATURATION: 93 % | WEIGHT: 133.82 LBS | TEMPERATURE: 98.6 F | HEART RATE: 73 BPM

## 2018-02-06 DIAGNOSIS — R11.2 NAUSEA VOMITING AND DIARRHEA: Primary | ICD-10-CM

## 2018-02-06 DIAGNOSIS — R19.7 NAUSEA VOMITING AND DIARRHEA: Primary | ICD-10-CM

## 2018-02-06 DIAGNOSIS — R10.84 GENERALIZED ABDOMINAL PAIN: ICD-10-CM

## 2018-02-06 LAB
ALBUMIN SERPL-MCNC: 3.6 G/DL (ref 3.5–5)
ALBUMIN/GLOB SERPL: 1 {RATIO} (ref 1.1–2.2)
ALP SERPL-CCNC: 60 U/L (ref 45–117)
ALT SERPL-CCNC: 30 U/L (ref 12–78)
ANION GAP SERPL CALC-SCNC: 9 MMOL/L (ref 5–15)
APPEARANCE UR: CLEAR
AST SERPL-CCNC: 27 U/L (ref 15–37)
BACTERIA URNS QL MICRO: NEGATIVE /HPF
BASOPHILS # BLD: 0 K/UL (ref 0–0.1)
BASOPHILS NFR BLD: 0 % (ref 0–1)
BILIRUB SERPL-MCNC: 0.6 MG/DL (ref 0.2–1)
BILIRUB UR QL: NEGATIVE
BUN SERPL-MCNC: 21 MG/DL (ref 6–20)
BUN/CREAT SERPL: 24 (ref 12–20)
CALCIUM SERPL-MCNC: 8.8 MG/DL (ref 8.5–10.1)
CHLORIDE SERPL-SCNC: 103 MMOL/L (ref 97–108)
CO2 SERPL-SCNC: 27 MMOL/L (ref 21–32)
COLOR UR: ABNORMAL
CREAT SERPL-MCNC: 0.87 MG/DL (ref 0.55–1.02)
DIFFERENTIAL METHOD BLD: ABNORMAL
EOSINOPHIL # BLD: 0.1 K/UL (ref 0–0.4)
EOSINOPHIL NFR BLD: 1 % (ref 0–7)
EPITH CASTS URNS QL MICRO: ABNORMAL /LPF
ERYTHROCYTE [DISTWIDTH] IN BLOOD BY AUTOMATED COUNT: 14.8 % (ref 11.5–14.5)
GLOBULIN SER CALC-MCNC: 3.5 G/DL (ref 2–4)
GLUCOSE SERPL-MCNC: 116 MG/DL (ref 65–100)
GLUCOSE UR STRIP.AUTO-MCNC: NEGATIVE MG/DL
HCT VFR BLD AUTO: 43.7 % (ref 35–47)
HGB BLD-MCNC: 14.4 G/DL (ref 11.5–16)
HGB UR QL STRIP: NEGATIVE
KETONES UR QL STRIP.AUTO: NEGATIVE MG/DL
LEUKOCYTE ESTERASE UR QL STRIP.AUTO: NEGATIVE
LIPASE SERPL-CCNC: 124 U/L (ref 73–393)
LYMPHOCYTES # BLD: 0.6 K/UL (ref 0.8–3.5)
LYMPHOCYTES NFR BLD: 6 % (ref 12–49)
MAGNESIUM SERPL-MCNC: 1.6 MG/DL (ref 1.6–2.4)
MCH RBC QN AUTO: 29.1 PG (ref 26–34)
MCHC RBC AUTO-ENTMCNC: 33 G/DL (ref 30–36.5)
MCV RBC AUTO: 88.3 FL (ref 80–99)
MONOCYTES # BLD: 0.3 K/UL (ref 0–1)
MONOCYTES NFR BLD: 3 % (ref 5–13)
NEUTS SEG # BLD: 9 K/UL (ref 1.8–8)
NEUTS SEG NFR BLD: 90 % (ref 32–75)
NITRITE UR QL STRIP.AUTO: NEGATIVE
PH UR STRIP: 7 [PH] (ref 5–8)
PLATELET # BLD AUTO: 228 K/UL (ref 150–400)
PMV BLD AUTO: 10.4 FL (ref 8.9–12.9)
POTASSIUM SERPL-SCNC: 3.7 MMOL/L (ref 3.5–5.1)
PROT SERPL-MCNC: 7.1 G/DL (ref 6.4–8.2)
PROT UR STRIP-MCNC: 30 MG/DL
RBC # BLD AUTO: 4.95 M/UL (ref 3.8–5.2)
RBC #/AREA URNS HPF: ABNORMAL /HPF (ref 0–5)
SODIUM SERPL-SCNC: 139 MMOL/L (ref 136–145)
SP GR UR REFRACTOMETRY: 1.01 (ref 1–1.03)
UROBILINOGEN UR QL STRIP.AUTO: 0.2 EU/DL (ref 0.2–1)
WBC # BLD AUTO: 10 K/UL (ref 3.6–11)
WBC URNS QL MICRO: ABNORMAL /HPF (ref 0–4)
XXWBCSUS: 0

## 2018-02-06 PROCEDURE — 85025 COMPLETE CBC W/AUTO DIFF WBC: CPT | Performed by: PHYSICIAN ASSISTANT

## 2018-02-06 PROCEDURE — 96361 HYDRATE IV INFUSION ADD-ON: CPT

## 2018-02-06 PROCEDURE — 96372 THER/PROPH/DIAG INJ SC/IM: CPT

## 2018-02-06 PROCEDURE — 36415 COLL VENOUS BLD VENIPUNCTURE: CPT | Performed by: EMERGENCY MEDICINE

## 2018-02-06 PROCEDURE — 74011250636 HC RX REV CODE- 250/636: Performed by: PHYSICIAN ASSISTANT

## 2018-02-06 PROCEDURE — 96374 THER/PROPH/DIAG INJ IV PUSH: CPT

## 2018-02-06 PROCEDURE — 74011000250 HC RX REV CODE- 250: Performed by: PHYSICIAN ASSISTANT

## 2018-02-06 PROCEDURE — 83735 ASSAY OF MAGNESIUM: CPT | Performed by: PHYSICIAN ASSISTANT

## 2018-02-06 PROCEDURE — 81001 URINALYSIS AUTO W/SCOPE: CPT | Performed by: PHYSICIAN ASSISTANT

## 2018-02-06 PROCEDURE — 96375 TX/PRO/DX INJ NEW DRUG ADDON: CPT

## 2018-02-06 PROCEDURE — 80053 COMPREHEN METABOLIC PANEL: CPT | Performed by: PHYSICIAN ASSISTANT

## 2018-02-06 PROCEDURE — 99284 EMERGENCY DEPT VISIT MOD MDM: CPT

## 2018-02-06 PROCEDURE — 83690 ASSAY OF LIPASE: CPT | Performed by: PHYSICIAN ASSISTANT

## 2018-02-06 RX ORDER — ONDANSETRON 4 MG/1
4 TABLET, ORALLY DISINTEGRATING ORAL
Qty: 10 TAB | Refills: 0 | Status: SHIPPED | OUTPATIENT
Start: 2018-02-06 | End: 2018-02-21

## 2018-02-06 RX ORDER — DICYCLOMINE HYDROCHLORIDE 10 MG/1
10 CAPSULE ORAL 4 TIMES DAILY
Qty: 20 CAP | Refills: 0 | Status: SHIPPED | OUTPATIENT
Start: 2018-02-06 | End: 2018-02-11

## 2018-02-06 RX ORDER — LISINOPRIL 20 MG/1
TABLET ORAL
Qty: 90 TAB | Refills: 0 | Status: SHIPPED | OUTPATIENT
Start: 2018-02-06 | End: 2018-04-29 | Stop reason: SDUPTHER

## 2018-02-06 RX ORDER — DICYCLOMINE HYDROCHLORIDE 10 MG/ML
20 INJECTION INTRAMUSCULAR
Status: COMPLETED | OUTPATIENT
Start: 2018-02-06 | End: 2018-02-06

## 2018-02-06 RX ORDER — ONDANSETRON 2 MG/ML
4 INJECTION INTRAMUSCULAR; INTRAVENOUS
Status: COMPLETED | OUTPATIENT
Start: 2018-02-06 | End: 2018-02-06

## 2018-02-06 RX ORDER — FAMOTIDINE 20 MG/1
20 TABLET, FILM COATED ORAL 2 TIMES DAILY
Qty: 20 TAB | Refills: 0 | Status: SHIPPED | OUTPATIENT
Start: 2018-02-06 | End: 2019-09-10 | Stop reason: SDUPTHER

## 2018-02-06 RX ADMIN — DICYCLOMINE HYDROCHLORIDE 20 MG: 20 INJECTION, SOLUTION INTRAMUSCULAR at 17:29

## 2018-02-06 RX ADMIN — ONDANSETRON 4 MG: 2 INJECTION INTRAMUSCULAR; INTRAVENOUS at 17:25

## 2018-02-06 RX ADMIN — SODIUM CHLORIDE 1000 ML: 900 INJECTION, SOLUTION INTRAVENOUS at 17:24

## 2018-02-06 RX ADMIN — FAMOTIDINE 20 MG: 10 INJECTION, SOLUTION INTRAVENOUS at 17:26

## 2018-02-06 NOTE — ED NOTES
The patient was discharged home by PEDRITO Marsh in stable condition. The patient is alert and oriented, in no respiratory distress and discharge vital signs obtained. The patient's diagnosis, condition and treatment were explained. The patient expressed understanding. Three prescriptions given. No work/school note given. A discharge plan has been developed. A  was not involved in the process. Aftercare instructions were given. Pt ambulatory out of the ED.

## 2018-02-06 NOTE — ED NOTES
IV fluid infusion completed. Patient states she is feeling better since fluid and medication administration. Patient tolerated ice chips well.

## 2018-02-06 NOTE — ED TRIAGE NOTES
65 yr old female ambulatory to tx area with c/o V,N,D since 2am. Vx5 Dx3. Feels weak in general and also has a HA. Her granddaughter has similar sx.

## 2018-02-06 NOTE — ED PROVIDER NOTES
HPI Comments: Liz Zazueta is a 65 y.o. female who presents ambulatory with son and grandson to the ED with a c/o n/v/d, abd pain since 2am. She states she stays with her family (daughter in law and son with children ) and they have had similar sx. She reports vomiting >10 episodes and diarrhea 4-5 episodes. Pt notes her whole abdomen hurts. She denies f/c, cp, sob, urinary sx or rash. She notes she has been having a diffuse headache that started when her sx started. She denies tx pta. PCP: Lizbet Hernandez MD  PMHx significant for: Past Medical History:  No date: Essential hypertension, benign  No date: Fibromyalgia  No date: Myalgia and myositis, unspecified  10/12/2010: Other and unspecified hyperlipidemia  10/12/2010: Palpitations      Comment: Dr. Idalia Chauhan  No date: Sleep apnea      Comment: Dr. Cynthia Staples, not taking cpap  No date: SOB (shortness of breath)  PSHx significant for: Past Surgical History:  No date: HX HYSTERECTOMY  No date: HX ORTHOPAEDIC      Comment: thumb and left foot  Social Hx: Tobacco: denies  EtOH: rare Illicit drug use: denies     There are no further complaints or symptoms at this time. The history is provided by the patient and a relative (son).         Past Medical History:   Diagnosis Date    Essential hypertension, benign     Fibromyalgia     Myalgia and myositis, unspecified     Other and unspecified hyperlipidemia 10/12/2010    Palpitations 10/12/2010    Dr. Brooke Eaton Sleep apnea     Dr. Cynthia Staples, not taking cpap    SOB (shortness of breath)        Past Surgical History:   Procedure Laterality Date    HX HYSTERECTOMY      HX ORTHOPAEDIC      thumb and left foot         Family History:   Problem Relation Age of Onset    Diabetes Mother 61    Cancer Maternal Grandmother      colon ca    Diabetes Maternal Grandmother 61    Cancer Paternal Grandmother      intestinal ca    Heart Attack Paternal Grandmother     Stroke Paternal Grandmother     Heart Attack Paternal Grandfather     Stroke Paternal Grandfather        Social History     Social History    Marital status:      Spouse name: N/A    Number of children: N/A    Years of education: N/A     Occupational History    Not on file. 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 HENT: Negative for congestion, rhinorrhea, sneezing and sore throat. Eyes: Negative for redness and visual disturbance. Respiratory: Negative for shortness of breath. Cardiovascular: Negative for chest pain and leg swelling. Gastrointestinal: Positive for abdominal pain, diarrhea and vomiting. Negative for constipation and nausea. Genitourinary: Negative for difficulty urinating and frequency. Musculoskeletal: Negative for back pain, myalgias and neck stiffness. Skin: Negative for rash. Neurological: Negative for dizziness, syncope, weakness and headaches. Hematological: Negative for adenopathy. Patient Vitals for the past 12 hrs:   Temp Pulse Resp BP SpO2   02/06/18 1845 - 73 14 132/65 93 %   02/06/18 1800 - 69 14 165/77 99 %   02/06/18 1730 - 73 15 173/73 98 %   02/06/18 1721 - 85 16 174/66 98 %   02/06/18 1603 - 85 15 - 98 %   02/06/18 1602 98.6 °F (37 °C) 80 11 150/79 99 %              Physical Exam   Constitutional: She is oriented to person, place, and time. She appears well-developed and well-nourished.  No distress. Ill, but non toxic appearing female   HENT:   Head: Normocephalic and atraumatic. Right Ear: External ear normal.   Left Ear: External ear normal.   Nose: Nose normal.   Mouth/Throat: No oropharyngeal exudate. Mm dry   Eyes: EOM are normal. Pupils are equal, round, and reactive to light. Neck: Neck supple. Cardiovascular: Normal rate, regular rhythm, normal heart sounds and intact distal pulses. Exam reveals no gallop and no friction rub. No murmur heard. Pulmonary/Chest: Effort normal and breath sounds normal. No stridor. No respiratory distress. She has no wheezes. She has no rales. She exhibits no tenderness. Abdominal: Soft. Bowel sounds are normal. She exhibits no distension and no mass. There is tenderness. There is no rebound and no guarding. Mild diffuse TTP without rebounding or guarding. No CVAT   Musculoskeletal: Normal range of motion. She exhibits no edema, tenderness or deformity. Neurological: She is alert and oriented to person, place, and time. No cranial nerve deficit. Coordination normal.   Skin: No rash noted. No erythema. No pallor. Psychiatric: She has a normal mood and affect. Her behavior is normal.   Nursing note and vitals reviewed. MDM  Number of Diagnoses or Management Options  Generalized abdominal pain:   Nausea vomiting and diarrhea:      Amount and/or Complexity of Data Reviewed  Clinical lab tests: reviewed and ordered  Tests in the medicine section of CPT®: ordered and reviewed  Obtain history from someone other than the patient: yes (Son and grandson)  Review and summarize past medical records: yes  Independent visualization of images, tracings, or specimens: yes    Patient Progress  Patient progress: stable        ED Course       Procedures  5:00 PM   Discussed pt, sx, hx and current findings with Dr Xander Mirza. He is in agreement with plan and will see pt. Will check abd labs, urine, give fluids and medication. Rosalinda Cali.  ROSE Mcintyre    6:00 PM   feeling better after meds. Will give po challenge  Shonna ReinaOmaira Mcintyre PA-C    6:33 PM    Dr Prem Romero in to see pt and update on findings. Feeling better. Will discharge after po challenge  Shonna Alayna Mcintyre PA-C    7:00 PM   Pt tolerated pos without new sx. Will discharge. Will tx for viral syndrome given family exposure  Shonnanury ReinaOmaira Mcintyre PA-C      LABORATORY TESTS:  Recent Results (from the past 12 hour(s))   CBC WITH AUTOMATED DIFF    Collection Time: 02/06/18  4:16 PM   Result Value Ref Range    WBC 10.0 3.6 - 11.0 K/uL    RBC 4.95 3.80 - 5.20 M/uL    HGB 14.4 11.5 - 16.0 g/dL    HCT 43.7 35.0 - 47.0 %    MCV 88.3 80.0 - 99.0 FL    MCH 29.1 26.0 - 34.0 PG    MCHC 33.0 30.0 - 36.5 g/dL    RDW 14.8 (H) 11.5 - 14.5 %    PLATELET 690 004 - 463 K/uL    MPV 10.4 8.9 - 12.9 FL    NEUTROPHILS 90 (H) 32 - 75 %    LYMPHOCYTES 6 (L) 12 - 49 %    MONOCYTES 3 (L) 5 - 13 %    EOSINOPHILS 1 0 - 7 %    BASOPHILS 0 0 - 1 %    ABS. NEUTROPHILS 9.0 (H) 1.8 - 8.0 K/UL    ABS. LYMPHOCYTES 0.6 (L) 0.8 - 3.5 K/UL    ABS. MONOCYTES 0.3 0.0 - 1.0 K/UL    ABS. EOSINOPHILS 0.1 0.0 - 0.4 K/UL    ABS. BASOPHILS 0.0 0.0 - 0.1 K/UL    DF AUTOMATED      XXWBCSUS 0     METABOLIC PANEL, COMPREHENSIVE    Collection Time: 02/06/18  4:16 PM   Result Value Ref Range    Sodium 139 136 - 145 mmol/L    Potassium 3.7 3.5 - 5.1 mmol/L    Chloride 103 97 - 108 mmol/L    CO2 27 21 - 32 mmol/L    Anion gap 9 5 - 15 mmol/L    Glucose 116 (H) 65 - 100 mg/dL    BUN 21 (H) 6 - 20 MG/DL    Creatinine 0.87 0.55 - 1.02 MG/DL    BUN/Creatinine ratio 24 (H) 12 - 20      GFR est AA >60 >60 ml/min/1.73m2    GFR est non-AA >60 >60 ml/min/1.73m2    Calcium 8.8 8.5 - 10.1 MG/DL    Bilirubin, total 0.6 0.2 - 1.0 MG/DL    ALT (SGPT) 30 12 - 78 U/L    AST (SGOT) 27 15 - 37 U/L    Alk.  phosphatase 60 45 - 117 U/L    Protein, total 7.1 6.4 - 8.2 g/dL    Albumin 3.6 3.5 - 5.0 g/dL    Globulin 3.5 2.0 - 4.0 g/dL    A-G Ratio 1.0 (L) 1.1 - 2.2     MAGNESIUM    Collection Time: 02/06/18  4:16 PM   Result Value Ref Range    Magnesium 1.6 1.6 - 2.4 mg/dL   LIPASE    Collection Time: 02/06/18  4:16 PM   Result Value Ref Range    Lipase 124 73 - 393 U/L   URINALYSIS W/MICROSCOPIC    Collection Time: 02/06/18  5:31 PM   Result Value Ref Range    Color YELLOW/STRAW      Appearance CLEAR CLEAR      Specific gravity 1.015 1.003 - 1.030      pH (UA) 7.0 5.0 - 8.0      Protein 30 (A) NEG mg/dL    Glucose NEGATIVE  NEG mg/dL    Ketone NEGATIVE  NEG mg/dL    Bilirubin NEGATIVE  NEG      Blood NEGATIVE  NEG      Urobilinogen 0.2 0.2 - 1.0 EU/dL    Nitrites NEGATIVE  NEG      Leukocyte Esterase NEGATIVE  NEG      WBC 0-4 0 - 4 /hpf    RBC 0-5 0 - 5 /hpf    Epithelial cells FEW FEW /lpf    Bacteria NEGATIVE  NEG /hpf       IMAGING RESULTS:    No results found. MEDICATIONS GIVEN:  Medications   sodium chloride 0.9 % bolus infusion 1,000 mL (0 mL IntraVENous IV Completed 2/6/18 1800)   ondansetron (ZOFRAN) injection 4 mg (4 mg IntraVENous Given 2/6/18 1725)   dicyclomine (BENTYL) 10 mg/mL injection 20 mg (20 mg IntraMUSCular Given 2/6/18 1729)   famotidine (PF) (PEPCID) 20 mg in sodium chloride 0.9 % 10 mL injection (20 mg IntraVENous Given 2/6/18 1726)       IMPRESSION:  1. Nausea vomiting and diarrhea    2. Generalized abdominal pain        PLAN:  1. Discharge Medication List as of 2/6/2018  6:55 PM      START taking these medications    Details   ondansetron (ZOFRAN ODT) 4 mg disintegrating tablet Take 1 Tab by mouth every eight (8) hours as needed for Nausea. , Print, Disp-10 Tab, R-0      dicyclomine (BENTYL) 10 mg capsule Take 1 Cap by mouth four (4) times daily for 5 days. , Print, Disp-20 Cap, R-0      famotidine (PEPCID) 20 mg tablet Take 1 Tab by mouth two (2) times a day., Print, Disp-20 Tab, R-0         CONTINUE these medications which have NOT CHANGED    Details   hydroCHLOROthiazide (HYDRODIURIL) 12.5 mg tablet TAKE 1 TABLET BY MOUTH  DAILY, Normal, Disp-90 Tab, R-0      atorvastatin (LIPITOR) 20 mg tablet TAKE 1 TABLET BY MOUTH  DAILY, Normal, Disp-90 Tab, R-1      amitriptyline (ELAVIL) 10 mg tablet Take 1 Tab by mouth nightly., Normal, Disp-90 Tab, R-0      !! lisinopril (PRINIVIL, ZESTRIL) 20 mg tablet TAKE 1 AND 1/2 TABLETS BY MOUTH DAILY, Normal, Disp-135 Tab, R-0      diclofenac (VOLTAREN) 1 % topical gel Apply 1 g to affected area four (4) times daily. , Normal, Disp-20 g, R-1      nebivolol (BYSTOLIC) 5 mg tablet Take 1 tablet by mouth daily. , Normal, Disp-30 tablet, R-0      MULTIVITAMIN W-MINERALS/LUTEIN (CENTRUM SILVER PO) Take  by mouth.  , Historical Med      Cholecalciferol, Vitamin D3, (VITAMIN D3) 1,000 unit cap Take 2,000 Units by mouth., Historical Med      aspirin 81 mg tablet Take  by mouth., Historical Med      !! lisinopril (PRINIVIL, ZESTRIL) 20 mg tablet TAKE 1 TABLET BY MOUTH  DAILY, Normal, Disp-90 Tab, R-0       !! - Potential duplicate medications found. Please discuss with provider. 2.   Follow-up Information     Follow up With Details Comments Contact Info    Fadumo Soriano MD Schedule an appointment as soon as possible for a visit 2-4 days for recheck [address] [facility] [address]  [address] [facility] [address] [facility]  [phone]          Return to ED if worse     6:53 PM  Pt has been reexamined. Pt has no new complaints, changes or physical findings. Care plan outlined and precautions discussed. All available results were reviewed with pt. All medications were reviewed with pt. All of pt's questions and concerns were addressed. Pt agrees to F/U as instructed and agrees to return to ED upon further deterioration. Pt is ready to go home.   PEDRITO Foley

## 2018-02-06 NOTE — ED NOTES
Patient medicated with IV zofran and pepcid, as well as IM dicyclomine. Patient tolerated well. IV fluids infusing via gravity without difficulty as ordered. Lights dimmed for comfort. Vital signs updated. Family at beside. Call bell in reach. Will continue to monitor.

## 2018-02-06 NOTE — DISCHARGE INSTRUCTIONS
Abdominal Pain: Care Instructions  Your Care Instructions    Abdominal pain has many possible causes. Some aren't serious and get better on their own in a few days. Others need more testing and treatment. If your pain continues or gets worse, you need to be rechecked and may need more tests to find out what is wrong. You may need surgery to correct the problem. Don't ignore new symptoms, such as fever, nausea and vomiting, urination problems, pain that gets worse, and dizziness. These may be signs of a more serious problem. Your doctor may have recommended a follow-up visit in the next 8 to 12 hours. If you are not getting better, you may need more tests or treatment. The doctor has checked you carefully, but problems can develop later. If you notice any problems or new symptoms, get medical treatment right away. Follow-up care is a key part of your treatment and safety. Be sure to make and go to all appointments, and call your doctor if you are having problems. It's also a good idea to know your test results and keep a list of the medicines you take. How can you care for yourself at home? · Rest until you feel better. · To prevent dehydration, drink plenty of fluids, enough so that your urine is light yellow or clear like water. Choose water and other caffeine-free clear liquids until you feel better. If you have kidney, heart, or liver disease and have to limit fluids, talk with your doctor before you increase the amount of fluids you drink. · If your stomach is upset, eat mild foods, such as rice, dry toast or crackers, bananas, and applesauce. Try eating several small meals instead of two or three large ones. · Wait until 48 hours after all symptoms have gone away before you have spicy foods, alcohol, and drinks that contain caffeine. · Do not eat foods that are high in fat. · Avoid anti-inflammatory medicines such as aspirin, ibuprofen (Advil, Motrin), and naproxen (Aleve).  These can cause stomach upset. Talk to your doctor if you take daily aspirin for another health problem. When should you call for help? Call 911 anytime you think you may need emergency care. For example, call if:  ? · You passed out (lost consciousness). ? · You pass maroon or very bloody stools. ? · You vomit blood or what looks like coffee grounds. ? · You have new, severe belly pain. ?Call your doctor now or seek immediate medical care if:  ? · Your pain gets worse, especially if it becomes focused in one area of your belly. ? · You have a new or higher fever. ? · Your stools are black and look like tar, or they have streaks of blood. ? · You have unexpected vaginal bleeding. ? · You have symptoms of a urinary tract infection. These may include:  ¨ Pain when you urinate. ¨ Urinating more often than usual.  ¨ Blood in your urine. ? · You are dizzy or lightheaded, or you feel like you may faint. ? Watch closely for changes in your health, and be sure to contact your doctor if:  ? · You are not getting better after 1 day (24 hours). Where can you learn more? Go to http://libbyBlueVoxcasey.info/. Enter D828 in the search box to learn more about \"Abdominal Pain: Care Instructions. \"  Current as of: March 20, 2017  Content Version: 11.4  © 1283-3780 [email or web address]. Care instructions adapted under license by [employer] (which disclaims liability or warranty for this information). If you have questions about a medical condition or this instruction, always ask your healthcare professional. [place] [address] any warranty or liability for your use of this information. Diarrhea: Care Instructions  Your Care Instructions    Diarrhea is loose, watery stools (bowel movements). The exact cause is often hard to find. Sometimes diarrhea is your body's way of getting rid of what caused an upset stomach.  Viruses, food poisoning, and many medicines can cause diarrhea. Some people get diarrhea in response to emotional stress, anxiety, or certain foods. Almost everyone has diarrhea now and then. It usually isn't serious, and your stools will return to normal soon. The important thing to do is replace the fluids you have lost, so you can prevent dehydration. The doctor has checked you carefully, but problems can develop later. If you notice any problems or new symptoms, get medical treatment right away. Follow-up care is a key part of your treatment and safety. Be sure to make and go to all appointments, and call your doctor if you are having problems. It's also a good idea to know your test results and keep a list of the medicines you take. How can you care for yourself at home? · Watch for signs of dehydration, which means your body has lost too much water. Dehydration is a serious condition and should be treated right away. Signs of dehydration are:  ¨ Increasing thirst and dry eyes and mouth. ¨ Feeling faint or lightheaded. ¨ Darker urine, and a smaller amount of urine than normal.  · To prevent dehydration, drink plenty of fluids, enough so that your urine is light yellow or clear like water. Choose water and other caffeine-free clear liquids until you feel better. If you have kidney, heart, or liver disease and have to limit fluids, talk with your doctor before you increase the amount of fluids you drink. · Begin eating small amounts of mild foods the next day, if you feel like it. ¨ Try yogurt that has live cultures of Lactobacillus. (Check the label.)  ¨ Avoid spicy foods, fruits, alcohol, and caffeine until 48 hours after all symptoms are gone. ¨ Avoid chewing gum that contains sorbitol. ¨ Avoid dairy products (except for yogurt with Lactobacillus) while you have diarrhea and for 3 days after symptoms are gone. · The doctor may recommend that you take over-the-counter medicine, such as loperamide (Imodium), if you still have diarrhea after 6 hours. Read and follow all instructions on the label. Do not use this medicine if you have bloody diarrhea, a high fever, or other signs of serious illness. Call your doctor if you think you are having a problem with your medicine. When should you call for help? Call 911 anytime you think you may need emergency care. For example, call if:  ? · You passed out (lost consciousness). ? · Your stools are maroon or very bloody. ?Call your doctor now or seek immediate medical care if:  ? · You are dizzy or lightheaded, or you feel like you may faint. ? · Your stools are black and look like tar, or they have streaks of blood. ? · You have new or worse belly pain. ? · You have symptoms of dehydration, such as:  ¨ Dry eyes and a dry mouth. ¨ Passing only a little dark urine. ¨ Feeling thirstier than usual.   ? · You have a new or higher fever. ? Watch closely for changes in your health, and be sure to contact your doctor if:  ? · Your diarrhea is getting worse. ? · You see pus in the diarrhea. ? · You are not getting better after 2 days (48 hours). Where can you learn more? Go to http://libby-casey.info/. Enter B153 in the search box to learn more about \"Diarrhea: Care Instructions. \"  Current as of: March 20, 2017  Content Version: 11.4  © 7169-4671 [employer]. Care instructions adapted under license by [employer] (which disclaims liability or warranty for this information). If you have questions about a medical condition or this instruction, always ask your healthcare professional. [place] [address] any warranty or liability for your use of this information. Nausea and Vomiting: Care Instructions  Your Care Instructions    When you are nauseated, you may feel weak and sweaty and notice a lot of saliva in your mouth. Nausea often leads to vomiting.  Most of the time you do not need to worry about nausea and vomiting, but they can be signs of other illnesses. Two common causes of nausea and vomiting are stomach flu and food poisoning. Nausea and vomiting from viral stomach flu will usually start to improve within 24 hours. Nausea and vomiting from food poisoning may last from 12 to 48 hours. The doctor has checked you carefully, but problems can develop later. If you notice any problems or new symptoms, get medical treatment right away. Follow-up care is a key part of your treatment and safety. Be sure to make and go to all appointments, and call your doctor if you are having problems. It's also a good idea to know your test results and keep a list of the medicines you take. How can you care for yourself at home? · To prevent dehydration, drink plenty of fluids, enough so that your urine is light yellow or clear like water. Choose water and other caffeine-free clear liquids until you feel better. If you have kidney, heart, or liver disease and have to limit fluids, talk with your doctor before you increase the amount of fluids you drink. · Rest in bed until you feel better. · When you are able to eat, try clear soups, mild foods, and liquids until all symptoms are gone for 12 to 48 hours. Other good choices include dry toast, crackers, cooked cereal, and gelatin dessert, such as Jell-O. When should you call for help? Call 911 anytime you think you may need emergency care. For example, call if:  ? · You passed out (lost consciousness). ?Call your doctor now or seek immediate medical care if:  ? · You have symptoms of dehydration, such as:  ¨ Dry eyes and a dry mouth. ¨ Passing only a little dark urine. ¨ Feeling thirstier than usual.   ? · You have new or worsening belly pain. ? · You have a new or higher fever. ? · You vomit blood or what looks like coffee grounds. ? Watch closely for changes in your health, and be sure to contact your doctor if:  ? · You have ongoing nausea and vomiting. ? · Your vomiting is getting worse.    ? · Your vomiting lasts longer than 2 days. ? · You are not getting better as expected. Where can you learn more? Go to http://libby-casey.info/. Enter 25 [id number] in the search box to learn more about \"Nausea and Vomiting: Care Instructions. \"  Current as of: March 20, 2017  Content Version: 11.4  © 5224-4451 [employer]. Care instructions adapted under license by [employer] (which disclaims liability or warranty for this information). If you have questions about a medical condition or this instruction, always ask your healthcare professional. [place] [address] any warranty or liability for your use of this information. We hope that we have addressed all of your medical concerns. The examination and treatment you received in the Emergency Department were for an emergent problem and were not intended as complete care. It is important that you follow up with your healthcare provider(s) for ongoing care. If your symptoms worsen or do not improve as expected, and you are unable to reach your usual health care provider(s), you should return to the Emergency Department. Today's healthcare is undergoing tremendous change, and patient satisfaction surveys are one of the many tools to assess the quality of medical care. You may receive a survey from the [employer] regarding your experience in the Emergency Department. I hope that your experience has been completely positive, particularly the medical care that I provided. As such, please participate in the survey; anything less than excellent does not meet my expectations or intentions. [address] [facility] and [address] [place] [address] participate in nationally recognized quality of care measures.   If your blood pressure is greater than 120/80, as reported below, we urge that you seek medical care to address the potential of high blood pressure, commonly known as hypertension. Hypertension can be hereditary or can be caused by certain medical conditions, pain, stress, or \"white coat syndrome. \"       Please make an appointment with your health care provider(s) for follow up of your Emergency Department visit. VITALS:   Patient Vitals for the past 8 hrs:   Temp Pulse Resp BP SpO2   02/06/18 1845 - 73 14 132/65 93 %   02/06/18 1800 - 69 14 165/77 99 %   02/06/18 1730 - 73 15 173/73 98 %   02/06/18 1721 - 85 16 174/66 98 %   02/06/18 1603 - 85 15 - 98 %   02/06/18 1602 98.6 °F (37 °C) 80 11 150/79 99 %          Thank you for allowing us to provide you with medical care today. We realize that you have many choices for your emergency care needs. Please choose us in the future for any continued health care needs. Kindra Mcintyre, [address] [place] [address].   Office: [phone]            Recent Results (from the past 24 hour(s))   CBC WITH AUTOMATED DIFF    Collection Time: 02/06/18  4:16 PM   Result Value Ref Range    WBC 10.0 3.6 - 11.0 K/uL    RBC 4.95 3.80 - 5.20 M/uL    HGB 14.4 11.5 - 16.0 g/dL    HCT 43.7 35.0 - 47.0 %    MCV 88.3 80.0 - 99.0 FL    MCH 29.1 26.0 - 34.0 PG    MCHC 33.0 30.0 - 36.5 g/dL    RDW 14.8 (H) 11.5 - 14.5 %    PLATELET 951 752 - 536 K/uL    MPV 10.4 8.9 - 12.9 FL    NEUTROPHILS 90 (H) 32 - 75 %    LYMPHOCYTES 6 (L) 12 - 49 %    MONOCYTES 3 (L) 5 - 13 %    EOSINOPHILS 1 0 - 7 %    BASOPHILS 0 0 - 1 %    ABS. NEUTROPHILS 9.0 (H) 1.8 - 8.0 K/UL    ABS. LYMPHOCYTES 0.6 (L) 0.8 - 3.5 K/UL    ABS. MONOCYTES 0.3 0.0 - 1.0 K/UL    ABS. EOSINOPHILS 0.1 0.0 - 0.4 K/UL    ABS.  BASOPHILS 0.0 0.0 - 0.1 K/UL    DF AUTOMATED      XXWBCSUS 0     METABOLIC PANEL, COMPREHENSIVE    Collection Time: 02/06/18  4:16 PM   Result Value Ref Range    Sodium 139 136 - 145 mmol/L    Potassium 3.7 3.5 - 5.1 mmol/L    Chloride 103 97 - 108 mmol/L    CO2 27 21 - 32 mmol/L    Anion gap 9 5 - 15 mmol/L    Glucose 116 (H) 65 - 100 mg/dL    BUN 21 (H) 6 - 20 MG/DL    Creatinine 0.87 0.55 - 1.02 MG/DL    BUN/Creatinine ratio 24 (H) 12 - 20      GFR est AA >60 >60 ml/min/1.73m2    GFR est non-AA >60 >60 ml/min/1.73m2    Calcium 8.8 8.5 - 10.1 MG/DL    Bilirubin, total 0.6 0.2 - 1.0 MG/DL    ALT (SGPT) 30 12 - 78 U/L    AST (SGOT) 27 15 - 37 U/L    Alk. phosphatase 60 45 - 117 U/L    Protein, total 7.1 6.4 - 8.2 g/dL    Albumin 3.6 3.5 - 5.0 g/dL    Globulin 3.5 2.0 - 4.0 g/dL    A-G Ratio 1.0 (L) 1.1 - 2.2     MAGNESIUM    Collection Time: 02/06/18  4:16 PM   Result Value Ref Range    Magnesium 1.6 1.6 - 2.4 mg/dL   LIPASE    Collection Time: 02/06/18  4:16 PM   Result Value Ref Range    Lipase 124 73 - 393 U/L   URINALYSIS W/MICROSCOPIC    Collection Time: 02/06/18  5:31 PM   Result Value Ref Range    Color YELLOW/STRAW      Appearance CLEAR CLEAR      Specific gravity 1.015 1.003 - 1.030      pH (UA) 7.0 5.0 - 8.0      Protein 30 (A) NEG mg/dL    Glucose NEGATIVE  NEG mg/dL    Ketone NEGATIVE  NEG mg/dL    Bilirubin NEGATIVE  NEG      Blood NEGATIVE  NEG      Urobilinogen 0.2 0.2 - 1.0 EU/dL    Nitrites NEGATIVE  NEG      Leukocyte Esterase NEGATIVE  NEG      WBC 0-4 0 - 4 /hpf    RBC 0-5 0 - 5 /hpf    Epithelial cells FEW FEW /lpf    Bacteria NEGATIVE  NEG /hpf       No results found.

## 2018-02-12 ENCOUNTER — TELEPHONE (OUTPATIENT)
Dept: INTERNAL MEDICINE CLINIC | Age: 78
End: 2018-02-12

## 2018-02-12 DIAGNOSIS — F41.0 PANIC ATTACK AS REACTION TO STRESS: Primary | ICD-10-CM

## 2018-02-12 DIAGNOSIS — F43.0 PANIC ATTACK AS REACTION TO STRESS: Primary | ICD-10-CM

## 2018-02-12 DIAGNOSIS — R73.09 IMPAIRED GLUCOSE TOLERANCE TEST: Primary | ICD-10-CM

## 2018-02-12 RX ORDER — LORAZEPAM 0.5 MG/1
TABLET ORAL
Qty: 2 TAB | Refills: 0 | OUTPATIENT
Start: 2018-02-12 | End: 2018-08-02 | Stop reason: ALTCHOICE

## 2018-02-12 RX ORDER — LORAZEPAM 0.5 MG/1
TABLET ORAL
Qty: 2 TAB | Refills: 0 | OUTPATIENT
Start: 2018-02-12 | End: 2018-02-12 | Stop reason: SDUPTHER

## 2018-02-12 NOTE — TELEPHONE ENCOUNTER
I called pt to advise of pcp's message. Pt asked that we mail her a lab slip. Lab slip placed in the mail.

## 2018-02-12 NOTE — TELEPHONE ENCOUNTER
----- Message from Mike Johnson MD sent at 2/9/2018  8:17 PM EST -----  Can you prasad tell pt I checked her labs and I think we need to do an a1c for her. We can give her the lab if she can check. thanks,miguel

## 2018-02-13 ENCOUNTER — HOSPITAL ENCOUNTER (OUTPATIENT)
Dept: MRI IMAGING | Age: 78
Discharge: HOME | End: 2018-02-13
Attending: ORTHOPAEDIC SURGERY
Payer: MEDICARE

## 2018-02-13 DIAGNOSIS — M47.816 LUMBAR SPONDYLOSIS: ICD-10-CM

## 2018-02-13 PROCEDURE — 72148 MRI LUMBAR SPINE W/O DYE: CPT

## 2018-02-20 ENCOUNTER — TELEPHONE (OUTPATIENT)
Dept: INTERNAL MEDICINE CLINIC | Age: 78
End: 2018-02-20

## 2018-02-20 NOTE — TELEPHONE ENCOUNTER
----- Message from Violeta Cockayne sent at 2/20/2018  1:00 PM EST -----  Regarding: Dr. Matti Arauz  Pt is calling to follow up on the form that was supposed to be mailed to complete blood work. The best contact is [phone].

## 2018-02-21 ENCOUNTER — TELEPHONE (OUTPATIENT)
Dept: INTERNAL MEDICINE CLINIC | Age: 78
End: 2018-02-21

## 2018-02-21 ENCOUNTER — HOSPITAL ENCOUNTER (EMERGENCY)
Age: 78
Discharge: HOME | End: 2018-02-21
Attending: EMERGENCY MEDICINE
Payer: MEDICARE

## 2018-02-21 ENCOUNTER — APPOINTMENT (OUTPATIENT)
Dept: CT IMAGING | Age: 78
End: 2018-02-21
Attending: NURSE PRACTITIONER
Payer: MEDICARE

## 2018-02-21 VITALS
RESPIRATION RATE: 14 BRPM | TEMPERATURE: 97.9 F | SYSTOLIC BLOOD PRESSURE: 150 MMHG | OXYGEN SATURATION: 94 % | DIASTOLIC BLOOD PRESSURE: 59 MMHG | HEIGHT: 60 IN | HEART RATE: 60 BPM | WEIGHT: 134.7 LBS | BODY MASS INDEX: 26.45 KG/M2

## 2018-02-21 DIAGNOSIS — K57.32 DIVERTICULITIS OF LARGE INTESTINE WITHOUT PERFORATION OR ABSCESS, UNSPECIFIED BLEEDING STATUS: Primary | ICD-10-CM

## 2018-02-21 LAB
ALBUMIN SERPL-MCNC: 3.6 G/DL (ref 3.5–5)
ALBUMIN/GLOB SERPL: 1 {RATIO} (ref 1.1–2.2)
ALP SERPL-CCNC: 66 U/L (ref 45–117)
ALT SERPL-CCNC: 28 U/L (ref 12–78)
ANION GAP SERPL CALC-SCNC: 8 MMOL/L (ref 5–15)
APPEARANCE UR: CLEAR
AST SERPL-CCNC: 25 U/L (ref 15–37)
BACTERIA URNS QL MICRO: NEGATIVE /HPF
BASOPHILS # BLD: 0.1 K/UL (ref 0–0.1)
BASOPHILS NFR BLD: 0 % (ref 0–1)
BILIRUB SERPL-MCNC: 0.4 MG/DL (ref 0.2–1)
BILIRUB UR QL: NEGATIVE
BUN SERPL-MCNC: 15 MG/DL (ref 6–20)
BUN/CREAT SERPL: 19 (ref 12–20)
CALCIUM SERPL-MCNC: 9.5 MG/DL (ref 8.5–10.1)
CHLORIDE SERPL-SCNC: 101 MMOL/L (ref 97–108)
CO2 SERPL-SCNC: 29 MMOL/L (ref 21–32)
COLOR UR: NORMAL
CREAT SERPL-MCNC: 0.81 MG/DL (ref 0.55–1.02)
DIFFERENTIAL METHOD BLD: ABNORMAL
EOSINOPHIL # BLD: 0.3 K/UL (ref 0–0.4)
EOSINOPHIL NFR BLD: 2 % (ref 0–7)
EPITH CASTS URNS QL MICRO: NORMAL /LPF
ERYTHROCYTE [DISTWIDTH] IN BLOOD BY AUTOMATED COUNT: 14.2 % (ref 11.5–14.5)
GLOBULIN SER CALC-MCNC: 3.7 G/DL (ref 2–4)
GLUCOSE SERPL-MCNC: 98 MG/DL (ref 65–100)
GLUCOSE UR STRIP.AUTO-MCNC: NEGATIVE MG/DL
HCT VFR BLD AUTO: 40.3 % (ref 35–47)
HGB BLD-MCNC: 13.4 G/DL (ref 11.5–16)
HGB UR QL STRIP: NEGATIVE
KETONES UR QL STRIP.AUTO: NEGATIVE MG/DL
LACTATE BLD-SCNC: 0.6 MMOL/L (ref 0.4–2)
LEUKOCYTE ESTERASE UR QL STRIP.AUTO: NEGATIVE
LYMPHOCYTES # BLD: 3.3 K/UL (ref 0.8–3.5)
LYMPHOCYTES NFR BLD: 25 % (ref 12–49)
MCH RBC QN AUTO: 29.2 PG (ref 26–34)
MCHC RBC AUTO-ENTMCNC: 33.3 G/DL (ref 30–36.5)
MCV RBC AUTO: 87.8 FL (ref 80–99)
MONOCYTES # BLD: 1 K/UL (ref 0–1)
MONOCYTES NFR BLD: 8 % (ref 5–13)
NEUTS SEG # BLD: 8.6 K/UL (ref 1.8–8)
NEUTS SEG NFR BLD: 65 % (ref 32–75)
NITRITE UR QL STRIP.AUTO: NEGATIVE
PH UR STRIP: 5.5 [PH] (ref 5–8)
PLATELET # BLD AUTO: 254 K/UL (ref 150–400)
PMV BLD AUTO: 9.7 FL (ref 8.9–12.9)
POTASSIUM SERPL-SCNC: 4.6 MMOL/L (ref 3.5–5.1)
PROT SERPL-MCNC: 7.3 G/DL (ref 6.4–8.2)
PROT UR STRIP-MCNC: NEGATIVE MG/DL
RBC # BLD AUTO: 4.59 M/UL (ref 3.8–5.2)
RBC #/AREA URNS HPF: NORMAL /HPF (ref 0–5)
SODIUM SERPL-SCNC: 138 MMOL/L (ref 136–145)
SP GR UR REFRACTOMETRY: <1.005 (ref 1–1.03)
UR CULT HOLD, URHOLD: NORMAL
UROBILINOGEN UR QL STRIP.AUTO: 0.2 EU/DL (ref 0.2–1)
WBC # BLD AUTO: 13.2 K/UL (ref 3.6–11)
WBC URNS QL MICRO: NORMAL /HPF (ref 0–4)
XXWBCSUS: 0

## 2018-02-21 PROCEDURE — 85025 COMPLETE CBC W/AUTO DIFF WBC: CPT | Performed by: EMERGENCY MEDICINE

## 2018-02-21 PROCEDURE — 80053 COMPREHEN METABOLIC PANEL: CPT | Performed by: EMERGENCY MEDICINE

## 2018-02-21 PROCEDURE — 96361 HYDRATE IV INFUSION ADD-ON: CPT

## 2018-02-21 PROCEDURE — 96374 THER/PROPH/DIAG INJ IV PUSH: CPT

## 2018-02-21 PROCEDURE — 99284 EMERGENCY DEPT VISIT MOD MDM: CPT

## 2018-02-21 PROCEDURE — 81001 URINALYSIS AUTO W/SCOPE: CPT | Performed by: EMERGENCY MEDICINE

## 2018-02-21 PROCEDURE — 74011636320 HC RX REV CODE- 636/320: Performed by: EMERGENCY MEDICINE

## 2018-02-21 PROCEDURE — 74177 CT ABD & PELVIS W/CONTRAST: CPT

## 2018-02-21 PROCEDURE — 36415 COLL VENOUS BLD VENIPUNCTURE: CPT | Performed by: EMERGENCY MEDICINE

## 2018-02-21 PROCEDURE — 74011250636 HC RX REV CODE- 250/636: Performed by: NURSE PRACTITIONER

## 2018-02-21 PROCEDURE — 83605 ASSAY OF LACTIC ACID: CPT

## 2018-02-21 RX ORDER — ONDANSETRON 4 MG/1
4 TABLET, ORALLY DISINTEGRATING ORAL
Qty: 12 TAB | Refills: 0 | Status: SHIPPED | OUTPATIENT
Start: 2018-02-21 | End: 2019-09-10 | Stop reason: ALTCHOICE

## 2018-02-21 RX ORDER — HYDROMORPHONE HYDROCHLORIDE 2 MG/1
1 TABLET ORAL
Qty: 15 TAB | Refills: 0 | Status: SHIPPED | OUTPATIENT
Start: 2018-02-21 | End: 2018-08-02 | Stop reason: ALTCHOICE

## 2018-02-21 RX ORDER — HYDROMORPHONE HYDROCHLORIDE 1 MG/ML
0.2 INJECTION, SOLUTION INTRAMUSCULAR; INTRAVENOUS; SUBCUTANEOUS ONCE
Status: COMPLETED | OUTPATIENT
Start: 2018-02-21 | End: 2018-02-21

## 2018-02-21 RX ORDER — AMOXICILLIN AND CLAVULANATE POTASSIUM 875; 125 MG/1; MG/1
1 TABLET, FILM COATED ORAL 2 TIMES DAILY
Qty: 14 TAB | Refills: 0 | Status: SHIPPED | OUTPATIENT
Start: 2018-02-21 | End: 2018-02-28

## 2018-02-21 RX ADMIN — IOPAMIDOL 100 ML: 755 INJECTION, SOLUTION INTRAVENOUS at 18:34

## 2018-02-21 RX ADMIN — SODIUM CHLORIDE 1000 ML: 900 INJECTION, SOLUTION INTRAVENOUS at 17:41

## 2018-02-21 RX ADMIN — HYDROMORPHONE HYDROCHLORIDE 0.2 MG: 1 INJECTION, SOLUTION INTRAMUSCULAR; INTRAVENOUS; SUBCUTANEOUS at 20:22

## 2018-02-21 NOTE — ED NOTES
Patient ambulated to the bathroom, for a second time, with a steady gait, to void. States that her \"lower belly pain has gotten worse. \"

## 2018-02-21 NOTE — TELEPHONE ENCOUNTER
Pt call sent to nurse from [facility], pt is nauseated, feels bloated, can't pass gas, tried going to BR , did pass blood, pt is concerned she has a blockage, having excruciating pain in lower abdomen , advised needs to go to ER for evaulation , will send message to PCP

## 2018-02-21 NOTE — ED TRIAGE NOTES
Pt ambulatory to treatment area with c/o \"lower abdominal pain that started this morning. \"  Pt denies vomiting, diarrhea, fevers, urinary symptoms. Pt states \"i noticed a little bit of blood on the toilet paper earlier.

## 2018-02-21 NOTE — ED NOTES
Patient just returned from her CT. Have asked her repeatedly if she wanted something for pain and she has \"said no. \" Family at bedside now.

## 2018-02-21 NOTE — ED PROVIDER NOTES
HPI Comments: 77 y.o. female with past medical history significant for HTN, fibromyalgia, sleep apnea who presents from home with chief complaint of abdominal pain. Patient states that she started having severe lower abdominal pain this morning. Called her PCP and was told to come into the ER. States that she wiped and there was blood on the toilet paper. Describes it as a sharp pain. States that the pain will subside very briefly and then return. Has not taken any medications PTA for pain. Last BM was yesterday, normal for her and denies blood. There are no other acute medical concerns at this time. Social hx: denies smoking, occasional ETOH  Significant FMHx: colon cancer, diverticulitis   PCP: Chalino Olivas MD        Patient is a 77 y.o. female presenting with abdominal pain. The history is provided by the patient. Abdominal Pain    Pertinent negatives include no fever, no diarrhea, no vomiting, no constipation, no dysuria, no chest pain and no back pain.         Past Medical History:   Diagnosis Date    Essential hypertension, benign     Fibromyalgia     Myalgia and myositis, unspecified     Other and unspecified hyperlipidemia 10/12/2010    Palpitations 10/12/2010    Dr. Burrell Learn Sleep apnea     Dr. Sandeep Lau, not taking cpap    SOB (shortness of breath)        Past Surgical History:   Procedure Laterality Date    HX HYSTERECTOMY      HX ORTHOPAEDIC      thumb and left foot         Family History:   Problem Relation Age of Onset    Diabetes Mother 61    Cancer Maternal Grandmother      colon ca    Diabetes Maternal Grandmother 61    Cancer Paternal Grandmother      intestinal ca    Heart Attack Paternal Grandmother     Stroke Paternal Grandmother     Heart Attack Paternal Grandfather     Stroke Paternal Grandfather        Social History     Social History    Marital status:      Spouse name: N/A    Number of children: N/A    Years of education: N/A     Occupational History    Not on file. 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 Negative for chills and fever. HENT: Negative for facial swelling. Eyes: Negative for visual disturbance. Respiratory: Negative for cough and shortness of breath. Cardiovascular: Negative for chest pain. Gastrointestinal: Positive for abdominal pain. Negative for blood in stool, constipation, diarrhea and vomiting. Genitourinary: Negative for difficulty urinating and dysuria. Musculoskeletal: Negative for back pain, neck pain and neck stiffness. Skin: Negative for rash. Neurological: Negative for dizziness and syncope. Psychiatric/Behavioral: Negative for confusion and decreased concentration. All other systems reviewed and are negative. Vitals:    02/21/18 1614   BP: 187/77   Pulse: 74   Resp: 22   Temp: 97.9 °F (36.6 °C)   SpO2: 96%   Weight: 61.1 kg (134 lb 11.2 oz)   Height: 5' (1.524 m)            Physical Exam   Constitutional: She is oriented to person, place, and time. She appears well-developed and well-nourished. No distress. HENT:   Head: Normocephalic and atraumatic. Right Ear: External ear normal.   Left Ear: External ear normal.   Nose: Nose normal.   PE tube seen on the right    Eyes: Conjunctivae and EOM are normal. Pupils are equal, round, and reactive to light. Neck: Normal range of motion.  Neck supple. No thyromegaly present. Cardiovascular: Normal rate, regular rhythm, normal heart sounds and intact distal pulses. No murmur heard. Pulmonary/Chest: Effort normal and breath sounds normal. No respiratory distress. She exhibits no tenderness. Abdominal: Soft. She exhibits no distension and no mass. Bowel sounds are increased. There is no hepatosplenomegaly. There is tenderness in the right lower quadrant and left lower quadrant. There is no rigidity, no guarding and no CVA tenderness. Musculoskeletal: Normal range of motion. She exhibits no edema or deformity. Lymphadenopathy:     She has no cervical adenopathy. Neurological: She is alert and oriented to person, place, and time. Skin: Skin is warm and dry. No rash noted. Psychiatric: She has a normal mood and affect. Her behavior is normal. Judgment and thought content normal.   Nursing note and vitals reviewed. MDM  Number of Diagnoses or Management Options  Diverticulitis of large intestine without perforation or abscess, unspecified bleeding status:   Diagnosis management comments: The patient presents with abdominal pain with a differential diagnosis inclusive of but not limited to abdominal pain, appendicitis, diverticulitis, gastritis, obstruction, cancer     CBC, CMP, lipase UA, CT abdomen/pelvis,IV fluids. Patient drove herself here, declined pain medications     Plan: Colitis vs diverticulitis. Will treat with antibiotics and have her follow-up with GI. Return to the ED for any concerning or worsening symptoms. She verbalized understanding        ED Course   Attending ODALIS Best MD examined the patient and is in agreement with the plan of care  Honorio Monterroso NP    8:00 PM  Went over all diagnostics with patient. Checking a lactic acid, and if normal will discharge patient on antibiotics with GI follow-up. She has been unable to provide a stool sample here in the ED.  States that she has not been on any antibiotics in the past month. She is in agreement with this plan of care. Went over treatment options with attending MANASA Jules MD and agreed to treat with Augmentin because of her Levaquin allergy   Elvia Palomo NP    8:22 PM  Went over discharge plan with the patient who is in agreement. All questions answered. Patient agreed to get pain medication prior to discharge. Will have her wait 20-30 minutes after getting medication to see how she tolerates it. Patient given discharge instructions and told to follow-up with GI, returning to the ED for any concerning signs or symptoms. Attending MANASA Jules MD will follow-up with patient after getting pain medication.  Vitals within normal limits, patient in no acute distress  Elvia Palomo NP    Procedures

## 2018-02-22 NOTE — TELEPHONE ENCOUNTER
Called pt per PCP, pt states she went to the ER yesterday, ER visit is in the chart. Pt states she is doing much better. They advised her to make an appt with GI. Pt will call GI office today to schedule an appt. I advise to let her know if there is anything else we can help her with. Pt verbalized understanding.

## 2018-02-22 NOTE — TELEPHONE ENCOUNTER
Please reach out to pt to see how she is doing. Please check if she is taking fluids and voiding. I can see her possibly tomorrow if needed . Friday will be out so may need to see another provider for reassasment if needed.

## 2018-02-22 NOTE — ED NOTES
Patient has been up to the bathroom several more times, with a steady gait, to void. No BM at this time.

## 2018-02-22 NOTE — ED NOTES
The patient was discharged home by Dr Guerrero Left in stable condition. The patient is alert and oriented, in no respiratory distress and discharge vital signs obtained. The patient's diagnosis, condition and treatment were explained. The patient expressed understanding. Prescriptions given. A discharge plan has been developed. A  was not involved in the process. Aftercare instructions were given. Pt ambulatory out of the ED with family.

## 2018-02-26 ENCOUNTER — TELEPHONE (OUTPATIENT)
Dept: INTERNAL MEDICINE CLINIC | Age: 78
End: 2018-02-26

## 2018-02-26 ENCOUNTER — OFFICE VISIT (OUTPATIENT)
Dept: INTERNAL MEDICINE CLINIC | Age: 78
End: 2018-02-26

## 2018-02-26 VITALS
WEIGHT: 134 LBS | SYSTOLIC BLOOD PRESSURE: 135 MMHG | HEIGHT: 60 IN | BODY MASS INDEX: 26.31 KG/M2 | DIASTOLIC BLOOD PRESSURE: 78 MMHG | HEART RATE: 74 BPM | TEMPERATURE: 98.6 F | OXYGEN SATURATION: 98 % | RESPIRATION RATE: 20 BRPM

## 2018-02-26 DIAGNOSIS — M47.896 OTHER OSTEOARTHRITIS OF SPINE, LUMBAR REGION: ICD-10-CM

## 2018-02-26 DIAGNOSIS — M79.7 FIBROMYALGIA: ICD-10-CM

## 2018-02-26 DIAGNOSIS — M53.3 SACROILIAC PAIN: Primary | ICD-10-CM

## 2018-02-26 RX ORDER — METHYLPREDNISOLONE 4 MG/1
TABLET ORAL
Qty: 1 DOSE PACK | Refills: 0 | Status: SHIPPED | OUTPATIENT
Start: 2018-02-26 | End: 2018-05-01 | Stop reason: ALTCHOICE

## 2018-02-26 RX ORDER — CYCLOBENZAPRINE HCL 10 MG
10 TABLET ORAL
Qty: 20 TAB | Refills: 0 | Status: SHIPPED | OUTPATIENT
Start: 2018-02-26 | End: 2019-09-10 | Stop reason: SDUPTHER

## 2018-02-26 NOTE — TELEPHONE ENCOUNTER
Pt called stating she is in a lot of pain. She hurt her back on Saturday by moving a box. Pt is requesting pcp to send in a medication. I advise pt needs to be seen, appt schedule with  at 3pm. Pt asked that I still send a message to pcp to see if she will call in a medication to her pharmacy without her being seen. I advise that I will send a message and if she doesn't hear back from our office to please come to her appt at 3pm today. Pt verbalized understanding.

## 2018-02-26 NOTE — MR AVS SNAPSHOT
[address] [facility] 
 
 
 [address] 
[phone] Patient: Noah Horowitz MRN: [id number] YGM:[id number] Visit Information Date & Time Provider Department Dept. Phone Encounter #  
 2/26/2018  3:00 PM Christian Hartley MD [facility] [address] [id number] Upcoming Health Maintenance Date Due  
 BREAST CANCER SCRN MAMMOGRAM 6/9/2018 GLAUCOMA SCREENING Q2Y 7/28/2018 MEDICARE YEARLY EXAM 8/5/2018 DTaP/Tdap/Td series (2 - Td) 2/14/2027 Allergies as of 2/26/2018  Review Complete On: 2/26/2018 By: Christian Hartley MD  
  
 Severity Noted Reaction Type Reactions Codeine  10/12/2010    Palpitations Levofloxacin  08/04/2017    Other (comments) Prednisone  10/12/2010    Palpitations Vioxx [Rofecoxib]  10/12/2010    Palpitations Current Immunizations  Reviewed on 10/25/2017 Name Date Influenza High Dose Vaccine PF 10/24/2017, 10/1/2016 Influenza Vaccine 9/30/2015 Pneumococcal Conjugate (PCV-13) 7/1/2016 Pneumococcal Polysaccharide (PPSV-23) 2/8/2013 Tdap 2/14/2017 Zoster Vaccine, Live 6/1/2014 Not reviewed this visit You Were Diagnosed With   
  
 Codes Comments Sacroiliac pain    -  Primary ICD-10-CM: M53.3 ICD-9-CM: 724.6 Fibromyalgia     ICD-10-CM: M79.7 ICD-9-CM: 729.1 Other osteoarthritis of spine, lumbar region     ICD-10-CM: M47.896 ICD-9-CM: 721.3 Vitals BP Pulse Temp Resp Height(growth percentile) Weight(growth percentile) 135/78 (BP 1 Location: Left arm, BP Patient Position: Sitting) 74 98.6 °F (37 °C) (Oral) 20 5' (1.524 m) 134 lb (60.8 kg) SpO2 BMI OB Status Smoking Status 98% 26.17 kg/m2 Hysterectomy Never Smoker Vitals History BMI and BSA Data Body Mass Index Body Surface Area  
 26.17 kg/m 2 1.6 m 2 Preferred Pharmacy Pharmacy Name Phone [facility], [address] [facility] [place] [phone] Your Updated Medication List  
  
   
This list is accurate as of 2/26/18  3:24 PM.  Always use your most recent med list.  
  
  
  
  
 amitriptyline 10 mg tablet Commonly known as:  ELAVIL Take 1 Tab by mouth nightly. amoxicillin-clavulanate 875-125 mg per tablet Commonly known as:  AUGMENTIN Take 1 Tab by mouth two (2) times a day for 7 days. Indications: Diverticulitis of Gastrointestinal Tract  
  
 aspirin 81 mg tablet Take  by mouth. atorvastatin 20 mg tablet Commonly known as:  LIPITOR  
TAKE 1 TABLET BY MOUTH  DAILY CENTRUM SILVER PO Take  by mouth. cyclobenzaprine 10 mg tablet Commonly known as:  FLEXERIL Take 1 Tab by mouth nightly as needed for Muscle Spasm(s). diclofenac 1 % Gel Commonly known as:  VOLTAREN Apply 1 g to affected area four (4) times daily. famotidine 20 mg tablet Commonly known as:  PEPCID Take 1 Tab by mouth two (2) times a day. hydroCHLOROthiazide 12.5 mg tablet Commonly known as:  HYDRODIURIL  
TAKE 1 TABLET BY MOUTH  DAILY HYDROmorphone 2 mg tablet Commonly known as:  DILAUDID Take 0.5 Tabs by mouth every four (4) hours as needed for Pain for up to 15 doses. Max Daily Amount: 6 mg.  
  
 * lisinopril 20 mg tablet Commonly known as:  PRINIVIL, ZESTRIL  
TAKE 1 AND 1/2 TABLETS BY MOUTH DAILY * lisinopril 20 mg tablet Commonly known as:  PRINIVIL, ZESTRIL  
TAKE 1 TABLET BY MOUTH  DAILY LORazepam 0.5 mg tablet Commonly known as:  ATIVAN Take 1/2 to 1 tab po prior to procedure. Do not drive while taking medication due to sedation  
  
 methylPREDNISolone 4 mg tablet Commonly known as:  Umair Sitrobyn Per pack  
  
 nebivolol 5 mg tablet Commonly known as:  BYSTOLIC Take 1 tablet by mouth daily. ondansetron 4 mg disintegrating tablet Commonly known as:  ZOFRAN ODT  
 Take 1 Tab by mouth every eight (8) hours as needed for Nausea. VITAMIN D3 1,000 unit Cap Generic drug:  cholecalciferol Take 2,000 Units by mouth. * Notice: This list has 2 medication(s) that are the same as other medications prescribed for you. Read the directions carefully, and ask your doctor or other care provider to review them with you. Prescriptions Sent to Pharmacy Refills  
 cyclobenzaprine (FLEXERIL) 10 mg tablet 0 Sig: Take 1 Tab by mouth nightly as needed for Muscle Spasm(s). Class: Normal  
 Pharmacy: [address] #: [phone] Route: Oral  
 methylPREDNISolone (MEDROL DOSEPACK) 4 mg tablet 0 Sig: Per pack Class: Normal  
 Pharmacy: [address] #: [phone] [facility] SERVICES! Dear Ariella Wolff: Thank you for requesting a [employer] account. Our records indicate that you already have an active [employer] account. You can access your account anytime at [email or web address]. [email or web address] Did you know that you can access your hospital and ER discharge instructions at any time in [employer]? You can also review all of your test results from your hospital stay or ER visit. Additional Information If you have questions, please visit the Frequently Asked Questions section of the [employer] website at [email or web address] Remember, [employer] is NOT to be used for urgent needs. For medical emergencies, dial 911. Now available from your iPhone and Android! Please provide this summary of care documentation to your next provider. Your primary care clinician is listed as David Lee. If you have any questions after today's visit, please call [phone].

## 2018-02-26 NOTE — PROGRESS NOTES
HISTORY OF PRESENT ILLNESS  Jeane Briceno is a 77 y.o. female. HPI  Patient of Dr. Sandra Clark with history of fibromyalgia who also recently had an MRI on 02/13/2018, which showed significant lumbar disk disease, spondylolisthesis and some disk bulges. She notes this past Saturday she was just twisting and had acute onset of pain in a focal area in the left low back. It does not radiate below the buttock. It is the worst pain she has experienced. She rates it as a 10/10. She is not having fevers, chills, numbness or weakness in her foot. It is uncomfortable to walk. She used heat over the weekend. The previous weekend, she had been to the emergency room with abdominal symptoms and was diagnosed with colitis. She was given 15 Dilaudid, but has not used them. She also has some leftover Pepcid from a GI illness, but has not used this. Review of Systems   Constitutional: Negative for chills, diaphoresis, fever, malaise/fatigue and weight loss. Respiratory: Negative for cough, shortness of breath and wheezing. Cardiovascular: Negative for chest pain, palpitations, orthopnea, leg swelling and PND. Gastrointestinal: Negative for heartburn and nausea. Genitourinary: Negative for dysuria, flank pain, frequency, hematuria and urgency. Musculoskeletal: Positive for back pain. Negative for falls and myalgias. Neurological: Negative for dizziness, sensory change, focal weakness and headaches. Physical Exam   Constitutional: She is oriented to person, place, and time. She appears well-developed and well-nourished. Looks uncomfortable   HENT:   Head: Normocephalic and atraumatic. Neck: Normal range of motion. Neck supple. Carotid bruit is not present. No thyromegaly present. Cardiovascular: Normal rate, regular rhythm, S1 normal, S2 normal, normal heart sounds and intact distal pulses. No murmur heard. Pulmonary/Chest: Effort normal and breath sounds normal. No respiratory distress. She has no wheezes. She has no rales. Musculoskeletal: She exhibits no edema. Able to forward bend and extend back and no pain  Able to stand on heels and toes  Pain with getting on and off exam table  Some pain over left si no vertebral point pain   Neurological: She is alert and oriented to person, place, and time. Skin: No rash noted. Psychiatric: She has a normal mood and affect. Her behavior is normal.   Nursing note and vitals reviewed. ASSESSMENT and PLAN  Diagnoses and all orders for this visit:    1. Sacroiliac pain  -     cyclobenzaprine (FLEXERIL) 10 mg tablet; Take 1 Tab by mouth nightly as needed for Muscle Spasm(s). -     methylPREDNISolone (MEDROL DOSEPACK) 4 mg tablet; Per pack  Take the pepcid bid for gi protection  Side effects possible reviewed in detail  appt in 2 weeks      2. Fibromyalgia    3.  Other osteoarthritis of spine, lumbar region

## 2018-03-13 ENCOUNTER — TELEPHONE (OUTPATIENT)
Dept: INTERNAL MEDICINE CLINIC | Age: 78
End: 2018-03-13

## 2018-03-13 NOTE — TELEPHONE ENCOUNTER
Tiarra Pak with Dr Seun Steve office is calling to follow up on a medical records request they sent over on 02/22 and this morning for the last office note and labs. States no one is replying back nor sending any records.          Naeem Npaoles is requesting to speak with the nurse [phone]

## 2018-03-14 DIAGNOSIS — M79.7 FIBROMYALGIA: ICD-10-CM

## 2018-03-15 RX ORDER — AMITRIPTYLINE HYDROCHLORIDE 10 MG/1
TABLET, FILM COATED ORAL
Qty: 90 TAB | Refills: 0 | Status: SHIPPED | OUTPATIENT
Start: 2018-03-15 | End: 2018-06-12 | Stop reason: SDUPTHER

## 2018-03-30 LAB
EST. AVERAGE GLUCOSE BLD GHB EST-MCNC: 134 MG/DL
HBA1C MFR BLD: 6.3 % (ref 4.8–5.6)

## 2018-04-29 RX ORDER — LISINOPRIL 20 MG/1
TABLET ORAL
Qty: 90 TAB | Refills: 0 | Status: SHIPPED | OUTPATIENT
Start: 2018-04-29 | End: 2018-07-18 | Stop reason: SDUPTHER

## 2018-05-01 ENCOUNTER — OFFICE VISIT (OUTPATIENT)
Dept: INTERNAL MEDICINE CLINIC | Age: 78
End: 2018-05-01

## 2018-05-01 VITALS
HEIGHT: 60 IN | SYSTOLIC BLOOD PRESSURE: 148 MMHG | RESPIRATION RATE: 16 BRPM | BODY MASS INDEX: 25.32 KG/M2 | TEMPERATURE: 98.3 F | WEIGHT: 129 LBS | DIASTOLIC BLOOD PRESSURE: 64 MMHG | HEART RATE: 83 BPM | OXYGEN SATURATION: 99 %

## 2018-05-01 DIAGNOSIS — E74.39 GLUCOSE INTOLERANCE: ICD-10-CM

## 2018-05-01 DIAGNOSIS — K57.30 DIVERTICULOSIS OF LARGE INTESTINE WITHOUT HEMORRHAGE: Primary | ICD-10-CM

## 2018-05-01 DIAGNOSIS — I10 ESSENTIAL HYPERTENSION: ICD-10-CM

## 2018-05-01 RX ORDER — HYDROCHLOROTHIAZIDE 12.5 MG/1
TABLET ORAL
Qty: 90 TAB | Refills: 0 | Status: SHIPPED | OUTPATIENT
Start: 2018-05-01 | End: 2018-07-16 | Stop reason: SDUPTHER

## 2018-05-01 RX ORDER — DICYCLOMINE HYDROCHLORIDE 10 MG/1
10 CAPSULE ORAL 4 TIMES DAILY
Qty: 40 CAP | Refills: 1 | Status: SHIPPED | OUTPATIENT
Start: 2018-05-01 | End: 2019-03-30 | Stop reason: SDUPTHER

## 2018-05-01 NOTE — MR AVS SNAPSHOT
[address] [facility] 
 
 
 [address] 
[phone] Patient: Randi Hernandez MRN: [id number] HFL:[id number] Visit Information Date & Time Provider Department Dept. Phone Encounter #  
 5/1/2018  2:00 PM Ghulam Pearson MD [facility] [address] [id number] Upcoming Health Maintenance Date Due  
 BREAST CANCER SCRN MAMMOGRAM 6/9/2018 GLAUCOMA SCREENING Q2Y 7/28/2018 Influenza Age 5 to Adult 8/1/2018 MEDICARE YEARLY EXAM 8/5/2018 DTaP/Tdap/Td series (2 - Td) 2/14/2027 Allergies as of 5/1/2018  Review Complete On: 5/1/2018 By: Sherice Tang LPN Severity Noted Reaction Type Reactions Codeine  10/12/2010    Palpitations Levofloxacin  08/04/2017    Other (comments) Prednisone  10/12/2010    Palpitations Vioxx [Rofecoxib]  10/12/2010    Palpitations Current Immunizations  Reviewed on 10/25/2017 Name Date Influenza High Dose Vaccine PF 10/24/2017, 10/1/2016 Influenza Vaccine 9/30/2015 Pneumococcal Conjugate (PCV-13) 7/1/2016 Pneumococcal Polysaccharide (PPSV-23) 2/8/2013 Tdap 2/14/2017 Zoster Vaccine, Live 6/1/2014 Not reviewed this visit You Were Diagnosed With   
  
 Codes Comments Diverticulosis of large intestine without hemorrhage    -  Primary ICD-10-CM: K57.30 ICD-9-CM: 562.10 Essential hypertension     ICD-10-CM: I10 
ICD-9-CM: 401.9 Glucose intolerance     ICD-10-CM: E74.39 
ICD-9-CM: 271.3 Vitals BP Pulse Temp Resp Height(growth percentile) Weight(growth percentile) 148/64 (BP 1 Location: Left arm, BP Patient Position: Sitting) 83 98.3 °F (36.8 °C) (Oral) 16 5' (1.524 m) 129 lb (58.5 kg) SpO2 BMI OB Status Smoking Status 99% 25.19 kg/m2 Hysterectomy Never Smoker BMI and BSA Data Body Mass Index Body Surface Area  
 25.19 kg/m 2 1.57 m 2 Preferred Pharmacy Pharmacy Name Phone [facility], [address] [phone] Your Updated Medication List  
  
   
This list is accurate as of 5/1/18  3:12 PM.  Always use your most recent med list.  
  
  
  
  
 amitriptyline 10 mg tablet Commonly known as:  ELAVIL TAKE 1 TABLET BY MOUTH  NIGHTLY  
  
 aspirin 81 mg tablet Take  by mouth. atorvastatin 20 mg tablet Commonly known as:  LIPITOR  
TAKE 1 TABLET BY MOUTH  DAILY CENTRUM SILVER PO Take  by mouth. cyclobenzaprine 10 mg tablet Commonly known as:  FLEXERIL Take 1 Tab by mouth nightly as needed for Muscle Spasm(s). diclofenac 1 % Gel Commonly known as:  VOLTAREN Apply 1 g to affected area four (4) times daily. dicyclomine 10 mg capsule Commonly known as:  BENTYL Take 1 Cap by mouth four (4) times daily. X 5 days  
  
 famotidine 20 mg tablet Commonly known as:  PEPCID Take 1 Tab by mouth two (2) times a day. hydroCHLOROthiazide 12.5 mg tablet Commonly known as:  HYDRODIURIL  
TAKE 1 TABLET BY MOUTH  DAILY HYDROmorphone 2 mg tablet Commonly known as:  DILAUDID Take 0.5 Tabs by mouth every four (4) hours as needed for Pain for up to 15 doses. Max Daily Amount: 6 mg.  
  
 lisinopril 20 mg tablet Commonly known as:  PRINIVIL, ZESTRIL  
TAKE 1 TABLET BY MOUTH  DAILY LORazepam 0.5 mg tablet Commonly known as:  ATIVAN Take 1/2 to 1 tab po prior to procedure. Do not drive while taking medication due to sedation  
  
 nebivolol 5 mg tablet Commonly known as:  BYSTOLIC Take 1 tablet by mouth daily. ondansetron 4 mg disintegrating tablet Commonly known as:  ZOFRAN ODT Take 1 Tab by mouth every eight (8) hours as needed for Nausea. VITAMIN D3 1,000 unit Cap Generic drug:  cholecalciferol Take 2,000 Units by mouth. Prescriptions Sent to Pharmacy Refills  
 dicyclomine (BENTYL) 10 mg capsule 1 Sig: Take 1 Cap by mouth four (4) times daily. X 5 days Class: Normal  
 Pharmacy: [address] Ph #: [phone] Route: Oral  
  
Introducing [facility] SERVICES! Dear Shaun Hernandes: Thank you for requesting a [employer] account. Our records indicate that you already have an active [employer] account. You can access your account anytime at [email or web address] Did you know that you can access your hospital and ER discharge instructions at any time in [employer]? You can also review all of your test results from your hospital stay or ER visit. Additional Information If you have questions, please visit the Frequently Asked Questions section of the [employer] website at [email or web address] Remember, [employer] is NOT to be used for urgent needs. For medical emergencies, dial 911. Now available from your iPhone and Android! Please provide this summary of care documentation to your next provider. Your primary care clinician is listed as Lena Baker. If you have any questions after today's visit, please call [phone].

## 2018-05-01 NOTE — PROGRESS NOTES
Chief Complaint   Patient presents with    GI Problem       Diverticulosis  Pt presents for follow up from Feb 2018 ER visit and s/p colonscopy with Dr. Brielle Baker 4/13/18. Pt reports went to ER in February with acute abdominal pain. CT noted to have colitis vs. Diverticulitis. She was given abx and went home but sx have not fully resolved. She is taking bentyl prn but has been saving pills because she would run out. Colonscopy recently revealed significant diverticulosis. She does not know what to eat but white carbs seem to settle her abdomen. No fever, no night sweats, no blood in stool. She is currently stable but when she flares it is 10/10 with mild relief with dicyclomine. Subjective:   Lauro Meeks is a 77 y.o. female with hypertension. Hypertension ROS: taking medications as instructed, no medication side effects noted, no TIA's, no chest pain on exertion, no dyspnea on exertion, no swelling of ankles. New concerns: none. Glucose intolerance  She is concerned her bs is going up due to her diet.                    Past Medical History:   Diagnosis Date    Essential hypertension, benign     Fibromyalgia     Myalgia and myositis, unspecified     Other and unspecified hyperlipidemia 10/12/2010    Palpitations 10/12/2010    Dr. Geeta Joseph Sleep apnea     Dr. Katerina Vizcarra, not taking cpap    SOB (shortness of breath)      Past Surgical History:   Procedure Laterality Date    HX HYSTERECTOMY      HX ORTHOPAEDIC      thumb and left foot     Social History     Social History    Marital status:      Spouse name: N/A    Number of children: N/A    Years of education: N/A     Social History Main Topics    Smoking status: Never Smoker    Smokeless tobacco: Never Used      Comment: passive smoke with     Alcohol use Yes      Comment: rare    Drug use: No    Sexual activity: No      Comment:  for 54 years     Other Topics Concern    None     Social History Narrative Retired:  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,    Retired 10 years            3 children: overweight son, smoker son 48 and 46,     Daughter healthy     Family History   Problem Relation Age of Onset    Diabetes Mother 61    Cancer Maternal Grandmother      colon ca    Diabetes Maternal Grandmother 61    Cancer Paternal Grandmother      intestinal ca    Heart Attack Paternal Grandmother     Stroke Paternal Grandmother     Heart Attack Paternal Grandfather     Stroke Paternal Grandfather      Current Outpatient Prescriptions   Medication Sig Dispense Refill    dicyclomine (BENTYL) 10 mg capsule Take 1 Cap by mouth four (4) times daily. X 5 days 40 Cap 1    lisinopril (PRINIVIL, ZESTRIL) 20 mg tablet TAKE 1 TABLET BY MOUTH  DAILY 90 Tab 0    atorvastatin (LIPITOR) 20 mg tablet TAKE 1 TABLET BY MOUTH  DAILY 90 Tab 0    amitriptyline (ELAVIL) 10 mg tablet TAKE 1 TABLET BY MOUTH  NIGHTLY 90 Tab 0    hydroCHLOROthiazide (HYDRODIURIL) 12.5 mg tablet TAKE 1 TABLET BY MOUTH  DAILY 90 Tab 0    nebivolol (BYSTOLIC) 5 mg tablet Take 1 tablet by mouth daily. 30 tablet 0    MULTIVITAMIN W-MINERALS/LUTEIN (CENTRUM SILVER PO) Take  by mouth.  Cholecalciferol, Vitamin D3, (VITAMIN D3) 1,000 unit cap Take 2,000 Units by mouth.  aspirin 81 mg tablet Take  by mouth.  cyclobenzaprine (FLEXERIL) 10 mg tablet Take 1 Tab by mouth nightly as needed for Muscle Spasm(s). 20 Tab 0    HYDROmorphone (DILAUDID) 2 mg tablet Take 0.5 Tabs by mouth every four (4) hours as needed for Pain for up to 15 doses. Max Daily Amount: 6 mg. 15 Tab 0    ondansetron (ZOFRAN ODT) 4 mg disintegrating tablet Take 1 Tab by mouth every eight (8) hours as needed for Nausea. 12 Tab 0    LORazepam (ATIVAN) 0.5 mg tablet Take 1/2 to 1 tab po prior to procedure. Do not drive while taking medication due to sedation 2 Tab 0    famotidine (PEPCID) 20 mg tablet Take 1 Tab by mouth two (2) times a day.  20 Tab 0    diclofenac (VOLTAREN) 1 % topical gel Apply 1 g to affected area four (4) times daily. 20 g 1     Allergies   Allergen Reactions    Codeine Palpitations    Levofloxacin Other (comments)    Prednisone Palpitations    Vioxx [Rofecoxib] Palpitations       Review of Systems - General ROS: negative for - chills, fatigue, fever, hot flashes, malaise, night sweats or sleep disturbance  Cardiovascular ROS: no chest pain or dyspnea on exertion  Respiratory ROS: no cough, shortness of breath, or wheezing    Visit Vitals    /64 (BP 1 Location: Left arm, BP Patient Position: Sitting)    Pulse 83    Temp 98.3 °F (36.8 °C) (Oral)    Resp 16    Ht 5' (1.524 m)    Wt 129 lb (58.5 kg)    SpO2 99%    BMI 25.19 kg/m2     General Appearance:  Well developed, well nourished,alert and oriented x 3, and individual in no acute distress. Ears/Nose/Mouth/Throat:   Hearing grossly normal.         Neck: Supple, no lad, no bruits   Chest:   Lungs clear to auscultation bilaterally. Cardiovascular:  Regular rate and rhythm, S1, S2 normal, no murmur. Abdomen:   Soft, non-tender, bowel sounds are active. Extremities: No edema bilaterally. Skin: Warm and dry, no suspicious lesions                 Diagnoses and all orders for this visit:    1. Diverticulosis of large intestine without hemorrhage  -     dicyclomine (BENTYL) 10 mg capsule; Take 1 Cap by mouth four (4) times daily. X 5 days  Discussed with pt if she has used her medication with refill in 3 months she needs to follow up with Dr. Norberto gr, to bland diet disucssed    2. Essential hypertension  Recheck 138/84  Cont meds for now  Recheck in 3 months    3. Glucose intolerance  Diabetic diet  Will recheck a1c at next visit          I spent 25 min with this patient and >50% of the time was spent on counseling and management of diverticulosis. I think she has a flare of diverticulitis mild and able to settle with dicyclomine.  I did discuss with her that she really should be be seen in the office or if needed ER if she feels she needs an abx for progressive sx. She would need  To be assessed  if augmentin should be rx'd and home vs going to the ER for fluids and IV abx. Also discussed se of cdiff and cautious use augmentin given se profile if used non judiciously. She agrees. She will follow up with me in 3 months but if increased sx will see Dr. Gricelda Clinton as well. This note will not be viewable in [address]

## 2018-06-12 RX ORDER — AMITRIPTYLINE HYDROCHLORIDE 10 MG/1
TABLET, FILM COATED ORAL
Qty: 90 TAB | Refills: 0 | Status: SHIPPED | OUTPATIENT
Start: 2018-06-12 | End: 2018-08-23 | Stop reason: SDUPTHER

## 2018-07-05 RX ORDER — ATORVASTATIN CALCIUM 20 MG/1
TABLET, FILM COATED ORAL
Qty: 90 TAB | Refills: 0 | Status: SHIPPED | OUTPATIENT
Start: 2018-07-05 | End: 2018-09-23 | Stop reason: SDUPTHER

## 2018-07-16 RX ORDER — HYDROCHLOROTHIAZIDE 12.5 MG/1
TABLET ORAL
Qty: 90 TAB | Refills: 0 | Status: SHIPPED | OUTPATIENT
Start: 2018-07-16 | End: 2018-10-04 | Stop reason: SDUPTHER

## 2018-07-19 RX ORDER — LISINOPRIL 20 MG/1
TABLET ORAL
Qty: 90 TAB | Refills: 1 | Status: SHIPPED | OUTPATIENT
Start: 2018-07-19 | End: 2018-08-02 | Stop reason: SDUPTHER

## 2018-08-02 ENCOUNTER — OFFICE VISIT (OUTPATIENT)
Dept: INTERNAL MEDICINE CLINIC | Age: 78
End: 2018-08-02

## 2018-08-02 VITALS
OXYGEN SATURATION: 96 % | WEIGHT: 129 LBS | TEMPERATURE: 98.1 F | BODY MASS INDEX: 25.32 KG/M2 | DIASTOLIC BLOOD PRESSURE: 84 MMHG | HEIGHT: 60 IN | RESPIRATION RATE: 18 BRPM | HEART RATE: 64 BPM | SYSTOLIC BLOOD PRESSURE: 142 MMHG

## 2018-08-02 DIAGNOSIS — Z13.31 SCREENING FOR DEPRESSION: ICD-10-CM

## 2018-08-02 DIAGNOSIS — E11.9 CONTROLLED TYPE 2 DIABETES MELLITUS WITHOUT COMPLICATION, WITH LONG-TERM CURRENT USE OF INSULIN (HCC): ICD-10-CM

## 2018-08-02 DIAGNOSIS — Z71.89 ADVANCED CARE PLANNING/COUNSELING DISCUSSION: ICD-10-CM

## 2018-08-02 DIAGNOSIS — Z00.00 MEDICARE ANNUAL WELLNESS VISIT, SUBSEQUENT: Primary | ICD-10-CM

## 2018-08-02 DIAGNOSIS — I10 ESSENTIAL HYPERTENSION: ICD-10-CM

## 2018-08-02 DIAGNOSIS — Z79.4 CONTROLLED TYPE 2 DIABETES MELLITUS WITHOUT COMPLICATION, WITH LONG-TERM CURRENT USE OF INSULIN (HCC): ICD-10-CM

## 2018-08-02 DIAGNOSIS — R73.09 ELEVATED GLUCOSE: ICD-10-CM

## 2018-08-02 DIAGNOSIS — M79.7 FIBROMYALGIA: ICD-10-CM

## 2018-08-02 RX ORDER — LISINOPRIL 20 MG/1
TABLET ORAL
Qty: 135 TAB | Refills: 1 | Status: SHIPPED | OUTPATIENT
Start: 2018-08-02 | End: 2018-12-31 | Stop reason: SDUPTHER

## 2018-08-02 NOTE — MR AVS SNAPSHOT
[facility] 
 
 
 [address] [place], [place] [address] 
[phone] Patient: Chrissie Angulo MRN: [id number] DGI:4[birth year]28[birth year]2142 Visit Information Date & Time Provider Department Dept. Phone Encounter #  
 8/2/2018 11:40 AM Carolynn Kramer MD [facility] [address] [id number] Upcoming Health Maintenance Date Due  
 BREAST CANCER SCRN MAMMOGRAM 6/9/2018 GLAUCOMA SCREENING Q2Y 7/28/2018 Influenza Age 5 to Adult 8/1/2018 MEDICARE YEARLY EXAM 8/5/2018 DTaP/Tdap/Td series (2 - Td) 2/14/2027 Allergies as of 8/2/2018  Review Complete On: 8/2/2018 By: Christy Dunn LPN Severity Noted Reaction Type Reactions Codeine  10/12/2010    Palpitations Levofloxacin  08/04/2017    Other (comments) Prednisone  10/12/2010    Palpitations Vioxx [Rofecoxib]  10/12/2010    Palpitations Current Immunizations  Reviewed on 10/25/2017 Name Date Influenza High Dose Vaccine PF 10/24/2017, 10/1/2016 Influenza Vaccine 9/30/2015 Pneumococcal Conjugate (PCV-13) 7/1/2016 Pneumococcal Polysaccharide (PPSV-23) 2/8/2013 Tdap 2/14/2017 Zoster Vaccine, Live 6/1/2014 Not reviewed this visit You Were Diagnosed With   
  
 Codes Comments Elevated glucose    -  Primary ICD-10-CM: R73.09 
ICD-9-CM: 790.29 Essential hypertension     ICD-10-CM: I10 
ICD-9-CM: 401.9 Fibromyalgia     ICD-10-CM: M79.7 ICD-9-CM: 729.1 Vitals BP Pulse Temp Resp Height(growth percentile) Weight(growth percentile) 142/84 (BP 1 Location: Left arm, BP Patient Position: Sitting) 64 98.1 °F (36.7 °C) (Oral) 18 5' (1.524 m) 129 lb (58.5 kg) SpO2 BMI OB Status Smoking Status 96% 25.19 kg/m2 Hysterectomy Never Smoker Vitals History BMI and BSA Data Body Mass Index Body Surface Area  
 25.19 kg/m 2 1.57 m 2 Preferred Pharmacy Pharmacy Name Phone [facility], [address] [facility] Joel Arnold [phone] Your Updated Medication List  
  
   
This list is accurate as of 8/2/18 12:23 PM.  Always use your most recent med list.  
  
  
  
  
 amitriptyline 10 mg tablet Commonly known as:  ELAVIL TAKE 1 TABLET BY MOUTH  NIGHTLY  
  
 aspirin 81 mg tablet Take  by mouth. atorvastatin 20 mg tablet Commonly known as:  LIPITOR  
TAKE 1 TABLET BY MOUTH  DAILY CENTRUM SILVER PO Take  by mouth. cyclobenzaprine 10 mg tablet Commonly known as:  FLEXERIL Take 1 Tab by mouth nightly as needed for Muscle Spasm(s). diclofenac 1 % Gel Commonly known as:  VOLTAREN Apply 1 g to affected area four (4) times daily. dicyclomine 10 mg capsule Commonly known as:  BENTYL Take 1 Cap by mouth four (4) times daily. X 5 days  
  
 famotidine 20 mg tablet Commonly known as:  PEPCID Take 1 Tab by mouth two (2) times a day. hydroCHLOROthiazide 12.5 mg tablet Commonly known as:  HYDRODIURIL  
TAKE 1 TABLET BY MOUTH  DAILY  
  
 lisinopril 20 mg tablet Commonly known as:  PRINIVIL, ZESTRIL  
TAKE 1.5  TABLET BY MOUTH  DAILY, 30 mg  
  
 nebivolol 5 mg tablet Commonly known as:  BYSTOLIC Take 1 tablet by mouth daily. ondansetron 4 mg disintegrating tablet Commonly known as:  ZOFRAN ODT Take 1 Tab by mouth every eight (8) hours as needed for Nausea. VITAMIN D3 1,000 unit Cap Generic drug:  cholecalciferol Take 2,000 Units by mouth. Prescriptions Printed Refills  
 lisinopril (PRINIVIL, ZESTRIL) 20 mg tablet 1 Sig: TAKE 1.5  TABLET BY MOUTH  DAILY, 30 mg  
 Class: Print We Performed the Following HEMOGLOBIN A1C WITH EAG [23260 CPT(R)] METABOLIC PANEL, COMPREHENSIVE [65884 CPT(R)] Patient Instructions Learning About Diuretics for High Blood Pressure Introduction Diuretics help to lower blood pressure. This reduces your risk of a heart attack and stroke. It also reduces your risk of kidney disease. Diuretics cause your kidneys to remove sodium and water. They also relax the blood vessel walls. These help lower your blood pressure. Examples · Chlorthalidone · Hydrochlorothiazide Possible side effects There are some common side effects. They are: · Too little potassium. · Feeling dizzy. · Rash. · Urinating a lot. · High blood sugar. (But this is not common.) You may have other side effects. Check the information that comes with your medicine. What to know about taking this medicine · You may take other medicines for blood pressure. Diuretics can help those work better. They can also prevent extra fluid in your body. · You may need to take potassium pills. Or you may have to watch how much potassium is in your food. Ask your doctor about this. · You may need blood tests to check your kidneys and your potassium level. · Take your medicines exactly as prescribed. Call your doctor if you think you are having a problem with your medicine. · Check with your doctor or pharmacist before you use any other medicines. This includes over-the-counter medicines. Make sure your doctor knows all of the medicines, vitamins, herbal products, and supplements you take. Taking some medicines together can cause problems. Where can you learn more? Go to http://libby-casey.info/. Enter W263 in the search box to learn more about \"Learning About Diuretics for High Blood Pressure. \" Current as of: May 10, 2017 Content Version: 11.7 © 3823-2943 [employer]. Care instructions adapted under license by [employer] (which disclaims liability or warranty for this information).  If you have questions about a medical condition or this instruction, always ask your healthcare professional. [employer] disclaims any warranty or liability for your use of this information. High Blood Pressure: Care Instructions Your Care Instructions If your blood pressure is usually above 130/80, you have high blood pressure, or hypertension. That means the top number is 130 or higher or the bottom number is 80 or higher, or both. Despite what a lot of people think, high blood pressure usually doesn't cause headaches or make you feel dizzy or lightheaded. It usually has no symptoms. But it does increase your risk for heart attack, stroke, and kidney or eye damage. The higher your blood pressure, the more your risk increases. Your doctor will give you a goal for your blood pressure. Your goal will be based on your health and your age. Lifestyle changes, such as eating healthy and being active, are always important to help lower blood pressure. You might also take medicine to reach your blood pressure goal. 
Follow-up care is a key part of your treatment and safety. Be sure to make and go to all appointments, and call your doctor if you are having problems. It's also a good idea to know your test results and keep a list of the medicines you take. How can you care for yourself at home? Medical treatment · If you stop taking your medicine, your blood pressure will go back up. You may take one or more types of medicine to lower your blood pressure. Be safe with medicines. Take your medicine exactly as prescribed. Call your doctor if you think you are having a problem with your medicine. · Talk to your doctor before you start taking aspirin every day. Aspirin can help certain people lower their risk of a heart attack or stroke. But taking aspirin isn't right for everyone, because it can cause serious bleeding. · See your doctor regularly. You may need to see the doctor more often at first or until your blood pressure comes down.  
· If you are taking blood pressure medicine, talk to your doctor before you take decongestants or anti-inflammatory medicine, such as ibuprofen. Some of these medicines can raise blood pressure. · Learn how to check your blood pressure at home. Lifestyle changes · Stay at a healthy weight. This is especially important if you put on weight around the waist. Losing even 10 pounds can help you lower your blood pressure. · If your doctor recommends it, get more exercise. Walking is a good choice. Bit by bit, increase the amount you walk every day. Try for at least 30 minutes on most days of the week. You also may want to swim, bike, or do other activities. · Avoid or limit alcohol. Talk to your doctor about whether you can drink any alcohol. · Try to limit how much sodium you eat to less than 2,300 milligrams (mg) a day. Your doctor may ask you to try to eat less than 1,500 mg a day. · Eat plenty of fruits (such as bananas and oranges), vegetables, legumes, whole grains, and low-fat dairy products. · Lower the amount of saturated fat in your diet. Saturated fat is found in animal products such as milk, cheese, and meat. Limiting these foods may help you lose weight and also lower your risk for heart disease. · Do not smoke. Smoking increases your risk for heart attack and stroke. If you need help quitting, talk to your doctor about stop-smoking programs and medicines. These can increase your chances of quitting for good. When should you call for help? Call 911 anytime you think you may need emergency care. This may mean having symptoms that suggest that your blood pressure is causing a serious heart or blood vessel problem. Your blood pressure may be over 180/110. 
 For example, call 911 if: 
  · You have symptoms of a heart attack. These may include: ¨ Chest pain or pressure, or a strange feeling in the chest. 
¨ Sweating. ¨ Shortness of breath. ¨ Nausea or vomiting.  
¨ Pain, pressure, or a strange feeling in the back, neck, jaw, or upper belly or in one or both shoulders or arms. ¨ Lightheadedness or sudden weakness. ¨ A fast or irregular heartbeat.  
  · You have symptoms of a stroke. These may include: 
¨ Sudden numbness, tingling, weakness, or loss of movement in your face, arm, or leg, especially on only one side of your body. ¨ Sudden vision changes. ¨ Sudden trouble speaking. ¨ Sudden confusion or trouble understanding simple statements. ¨ Sudden problems with walking or balance. ¨ A sudden, severe headache that is different from past headaches.  
  · You have severe back or belly pain.  
 Do not wait until your blood pressure comes down on its own. Get help right away. 
 Call your doctor now or seek immediate care if: 
  · Your blood pressure is much higher than normal (such as 180/110 or higher), but you don't have symptoms.  
  · You think high blood pressure is causing symptoms, such as: ¨ Severe headache. ¨ Blurry vision.  
 Watch closely for changes in your health, and be sure to contact your doctor if: 
  · Your blood pressure measures 140/90 or higher at least 2 times. That means the top number is 140 or higher or the bottom number is 90 or higher, or both.  
  · You think you may be having side effects from your blood pressure medicine.  
  · Your blood pressure is usually normal, but it goes above normal at least 2 times. Where can you learn more? Go to http://libby-casey.info/. Enter O434 in the search box to learn more about \"High Blood Pressure: Care Instructions. \" Current as of: December 6, 2017 Content Version: 11.7 © 9403-5217 [employer]. Care instructions adapted under license by [employer] (which disclaims liability or warranty for this information). If you have questions about a medical condition or this instruction, always ask your healthcare professional. [address] any warranty or liability for your use of this information. Introducing [facility]! Dear Shira Chow: Thank you for requesting a [employer] account. Our records indicate that you already have an active B[employer]el account. You can access your account anytime at [email or web address] Did you know that you can access your hospital and ER discharge instructions at any time in B[employer]el? You can also review all of your test results from your hospital stay or ER visit. Additional Information If you have questions, please visit the Frequently Asked Questions section of the [employer] website at [email or web address] Remember, B[employer]el is NOT to be used for urgent needs. For medical emergencies, dial 911. Now available from your iPhone and Android! Please provide this summary of care documentation to your next provider. Your primary care clinician is listed as Doroteo Booker. If you have any questions after today's visit, please call [phone].

## 2018-08-02 NOTE — PATIENT INSTRUCTIONS
Learning About Diuretics for High Blood Pressure Introduction Diuretics help to lower blood pressure. This reduces your risk of a heart attack and stroke. It also reduces your risk of kidney disease. Diuretics cause your kidneys to remove sodium and water. They also relax the blood vessel walls. These help lower your blood pressure. Examples · Chlorthalidone · Hydrochlorothiazide Possible side effects There are some common side effects. They are: · Too little potassium. · Feeling dizzy. · Rash. · Urinating a lot. · High blood sugar. (But this is not common.) You may have other side effects. Check the information that comes with your medicine. What to know about taking this medicine · You may take other medicines for blood pressure. Diuretics can help those work better. They can also prevent extra fluid in your body. · You may need to take potassium pills. Or you may have to watch how much potassium is in your food. Ask your doctor about this. · You may need blood tests to check your kidneys and your potassium level. · Take your medicines exactly as prescribed. Call your doctor if you think you are having a problem with your medicine. · Check with your doctor or pharmacist before you use any other medicines. This includes over-the-counter medicines. Make sure your doctor knows all of the medicines, vitamins, herbal products, and supplements you take. Taking some medicines together can cause problems. Where can you learn more? Go to http://libby-casey.info/. Enter W193 in the search box to learn more about \"Learning About Diuretics for High Blood Pressure. \" Current as of: May 10, 2017 Content Version: 11.7 © 0671-2570 [employer]. Care instructions adapted under license by [employer] (which disclaims liability or warranty for this information).  If you have questions about a medical condition or this instruction, always ask your healthcare professional. [address] any warranty or liability for your use of this information. High Blood Pressure: Care Instructions Your Care Instructions If your blood pressure is usually above 130/80, you have high blood pressure, or hypertension. That means the top number is 130 or higher or the bottom number is 80 or higher, or both. Despite what a lot of people think, high blood pressure usually doesn't cause headaches or make you feel dizzy or lightheaded. It usually has no symptoms. But it does increase your risk for heart attack, stroke, and kidney or eye damage. The higher your blood pressure, the more your risk increases. Your doctor will give you a goal for your blood pressure. Your goal will be based on your health and your age. Lifestyle changes, such as eating healthy and being active, are always important to help lower blood pressure. You might also take medicine to reach your blood pressure goal. 
Follow-up care is a key part of your treatment and safety. Be sure to make and go to all appointments, and call your doctor if you are having problems. It's also a good idea to know your test results and keep a list of the medicines you take. How can you care for yourself at home? Medical treatment · If you stop taking your medicine, your blood pressure will go back up. You may take one or more types of medicine to lower your blood pressure. Be safe with medicines. Take your medicine exactly as prescribed. Call your doctor if you think you are having a problem with your medicine. · Talk to your doctor before you start taking aspirin every day. Aspirin can help certain people lower their risk of a heart attack or stroke. But taking aspirin isn't right for everyone, because it can cause serious bleeding. · See your doctor regularly. You may need to see the doctor more often at first or until your blood pressure comes down.  
· If you are taking blood pressure medicine, talk to your doctor before you take decongestants or anti-inflammatory medicine, such as ibuprofen. Some of these medicines can raise blood pressure. · Learn how to check your blood pressure at home. Lifestyle changes · Stay at a healthy weight. This is especially important if you put on weight around the waist. Losing even 10 pounds can help you lower your blood pressure. · If your doctor recommends it, get more exercise. Walking is a good choice. Bit by bit, increase the amount you walk every day. Try for at least 30 minutes on most days of the week. You also may want to swim, bike, or do other activities. · Avoid or limit alcohol. Talk to your doctor about whether you can drink any alcohol. · Try to limit how much sodium you eat to less than 2,300 milligrams (mg) a day. Your doctor may ask you to try to eat less than 1,500 mg a day. · Eat plenty of fruits (such as bananas and oranges), vegetables, legumes, whole grains, and low-fat dairy products. · Lower the amount of saturated fat in your diet. Saturated fat is found in animal products such as milk, cheese, and meat. Limiting these foods may help you lose weight and also lower your risk for heart disease. · Do not smoke. Smoking increases your risk for heart attack and stroke. If you need help quitting, talk to your doctor about stop-smoking programs and medicines. These can increase your chances of quitting for good. When should you call for help? Call 911 anytime you think you may need emergency care. This may mean having symptoms that suggest that your blood pressure is causing a serious heart or blood vessel problem. Your blood pressure may be over 180/110. 
 For example, call 911 if: 
  · You have symptoms of a heart attack. These may include: ¨ Chest pain or pressure, or a strange feeling in the chest. 
¨ Sweating. ¨ Shortness of breath. ¨ Nausea or vomiting.  
¨ Pain, pressure, or a strange feeling in the back, neck, jaw, or upper belly or in one or both shoulders or arms. ¨ Lightheadedness or sudden weakness. ¨ A fast or irregular heartbeat.  
  · You have symptoms of a stroke. These may include: 
¨ Sudden numbness, tingling, weakness, or loss of movement in your face, arm, or leg, especially on only one side of your body. ¨ Sudden vision changes. ¨ Sudden trouble speaking. ¨ Sudden confusion or trouble understanding simple statements. ¨ Sudden problems with walking or balance. ¨ A sudden, severe headache that is different from past headaches.  
  · You have severe back or belly pain.  
 Do not wait until your blood pressure comes down on its own. Get help right away. 
 Call your doctor now or seek immediate care if: 
  · Your blood pressure is much higher than normal (such as 180/110 or higher), but you don't have symptoms.  
  · You think high blood pressure is causing symptoms, such as: ¨ Severe headache. ¨ Blurry vision.  
 Watch closely for changes in your health, and be sure to contact your doctor if: 
  · Your blood pressure measures 140/90 or higher at least 2 times. That means the top number is 140 or higher or the bottom number is 90 or higher, or both.  
  · You think you may be having side effects from your blood pressure medicine.  
  · Your blood pressure is usually normal, but it goes above normal at least 2 times. Where can you learn more? Go to http://libby-casey.info/. Enter R144 in the search box to learn more about \"High Blood Pressure: Care Instructions. \" Current as of: December 6, 2017 Content Version: 11.7 © 4745-6565 [employer]. Care instructions adapted under license by [employer] (which disclaims liability or warranty for this information). If you have questions about a medical condition or this instruction, always ask your healthcare professional. [place] [address] any warranty or liability for your use of this information.  
Medicare Part B Preventive Services Guidelines/Limitations Date last completed and Frequency Due Date Bone Mass Measurement 
(age 72 & older, biennial) Requires diagnosis related to osteoporosis or estrogen deficiency. Biennial benefit unless patient has history of long-term glucocorticoid tx or baseline is needed because initial test was by other method Completed 8/2017 Recommended every 2 years As recommended by your PCP or Specialist 
  
Cardiovascular Screening Blood Tests (every 5 years) Total cholesterol, HDL, Triglycerides Order as a panel if possible Completed 8/2017 As recommended by your PCP As recommended by your PCP or Specialist  
Colorectal Cancer Screening 
-Fecal occult blood test (annual) -Flexible sigmoidoscopy (5y) 
-Screening colonoscopy (10y) -Barium Enema Age 49-80; After age [de-identified] if history of abnormal results Completed 4/13/2018 Recommended every 5 to 10 years  As recommended by your PCP or Specialist 
  
Counseling to Prevent Tobacco Use (up to 8 sessions per year) - Counseling greater than 3 and up to 10 minutes - Counseling greater than 10 minutes Patients must be asymptomatic of tobacco-related conditions to receive as preventive service N/A N/A Diabetes Screening Tests (at least every 3 years, Medicare covers annually or at 6-month intervals for prediabetic patients) Fasting blood sugar (FBS) or glucose tolerance test (GTT) Patient must be diagnosed with one of the following: 
-Hypertension, Dyslipidemia, obesity, previous impaired FBS or GTT 
Or any two of the following: overweight, FH of diabetes, age ? 72, history of gestational diabetes, birth of baby weighing more than 9 pounds Completed 2/2018 Recommended every 3 years for non-diabetics As recommended by your PCP or Specialist 
  
Glaucoma Screening (no USPSTF recommendation) Diabetes mellitus, family history, , age 48 or over,  American, age 72 or over Completed within the last year Recommended annually As recommended by your PCP or Specialist  
Seasonal Influenza Vaccination (annually)  Completed 10/2017 Recommended Annually Due Fall 2018 TDAP Vaccination  Completed 2/2017 Recommended every 10 years As recommended by your PCP or Specialist  
Zoster (Shingles) Vaccination Covered by Medicare Part D through the pharmacy- PCP provides prescription Completed 6/2014 Recommended once over age 48  Complete Pneumococcal Vaccination (once after 65)  Pneumo 23- 2/2013 Recommended once over the age of 72 Prevnar 13- 7/2016 Recommended once over the age of 72 Complete Complete Screening Mammography (biennial age 54-69) Annually (age 36 or over) Completed 7/2018 As recommended by your PCP or Specialist 
  
Screening Pap Tests and Pelvic Examination (up to age 79 and after 79 if unknown history or abnormal study last 10 years) Every 24 months except high risk As recommended by your PCP or Specialist 
 As recommended by your PCP or Specialist 
  
Ultrasound Screening for Abdominal Aortic Aneurysm (AAA) (once) Patient must be referred through IPPE and not have had a screening for abdominal aortic aneurysm before under Medicare. Limited to patients who meet one of the following criteria: 
- Men who are 73-68 years old and have smoked more than 100 cigarettes in their lifetime. 
-Anyone with a FH of AAA 
-Anyone recommended for screening by USPSTF Not indicated unless recommended by PCP Not indicated unless recommended by PCP Family Practice Management 2011 Please bring a copy of your completed advance medical directive to the office so it may be added to your medical record. Thank you. If you have any questions or concerns please feel free to contact me at [phone]. It was a pleasure meeting you today and participating in your healthcare. Jamie Ceballos RN Medicare Wellness Visit, Female The best way to live healthy is to have a lifestyle where you eat a well-balanced diet, exercise regularly, limit alcohol use, and quit all forms of tobacco/nicotine, if applicable. Regular preventive services are another way to keep healthy. Preventive services (vaccines, screening tests, monitoring & exams) can help personalize your care plan, which helps you manage your own care. Screening tests can find health problems at the earliest stages, when they are easiest to treat. MarcosNaina Sharmila Maldonado follows the current, evidence-based guidelines published by the [facility] ([facility]) when recommending preventive services for our patients. Because we follow these guidelines, sometimes recommendations change over time as research supports it. (For example, mammograms used to be recommended annually. Even though Medicare will still pay for an annual mammogram, the newer guidelines recommend a mammogram every two years for women of average risk.) Of course, you and your provider may decide to screen more often for some diseases, based on your risk and co-morbidities (chronic disease you are already diagnosed with). Preventive services for you include: - Medicare offers their members a free annual wellness visit, which is time for you and your primary care provider to discuss and plan for your preventive service needs. Take advantage of this benefit every year! 
 
-All people over age 72 should receive the recommended pneumonia vaccines. Current USPSTF guidelines recommend a series of two vaccines for the best pneumonia protection.  
 
-All adults should have a yearly flu vaccine and a tetanus vaccine every 10 years. All adults age 61 years should receive a shingles vaccine once in their lifetime.   
 
-A bone mass density test is recommended when a woman turns 65 to screen for osteoporosis. This test is only recommended once as a screening. Some providers will use this same test as a disease monitoring tool if you already have osteoporosis.  
 
-All adults age 38-68 years who are overweight should have a diabetes screening test once every three years.  
 
-Other screening tests & preventive services for persons with diabetes include: an eye exam to screen for diabetic retinopathy, a kidney function test, a foot exam, and stricter control over your cholesterol.  
 
-Cardiovascular screening for adults with routine risk involves an electrocardiogram (ECG) at intervals determined by the provider.  
 
-Colorectal cancer screenings should be done for adults age 54-65 years with normal risk. There are a number of acceptable methods of screening for this type of cancer. Each test has its own benefits and drawbacks. Discuss with your provider what is most appropriate for you during your annual wellness visit. The different tests include: colonoscopy (considered the best screening method), a fecal occult blood test, a fecal DNA test, and sigmoidoscopy. -Breast cancer screenings are recommended every other year for women of normal risk age 54-69 years.  
 
-Cervical cancer screenings for women over age 72 are only recommended with certain risk factors.  
 
-All adults born between [facility] should be screened once for Hepatitis C. Here is a list of your current Health Maintenance items (your personalized list of preventive services) with a due date: 
Health Maintenance Due Topic Date Due  
 Breast Cancer Screening  06/09/2018  Glaucoma Screening   07/28/2018  Flu Vaccine  08/01/2018

## 2018-08-02 NOTE — PROGRESS NOTES
Chief Complaint Patient presents with  Medication Evaluation Pt presents for follow up on her blood pressure. Since last visit her abdominal pain from diverticutitis flare has resolved and back pain also resolved. She also had lymph node swelling behind her head and went to PT [facility]. She was given Keflex and this too has resolved. Subjective:  
Acosta Kaur is a 77 y.o. female with hypertension. Hypertension ROS: taking medications as instructed, no medication side effects noted, no TIA's, no chest pain on exertion, no dyspnea on exertion, no swelling of ankles. New concerns: she has intentional weight loss of 12 pounds, she is anxious about an upcoming dental procedure Fibromyalgia She is still taking amitriptyline to help her sleep. She stopped her lexapro and feels she has lost some weight with coming off this medication. Osteoporosis Bone density reviewed with pt. She is not in favor with starting bisphosphonate She is taking calcium and vitamin d. Situational stress She reports dental procedure stresses her and will have next week. Diabetes Diet controlled. She is not on bs meds. She intentionally lost 12 pounds. She will go back to exercsing if her knee feels better. Past Medical History:  
Diagnosis Date  Essential hypertension, benign  Fibromyalgia  Myalgia and myositis, unspecified  Other and unspecified hyperlipidemia 10/12/2010  Palpitations 10/12/2010 Dr. Pauline Dillard  Sleep apnea Dr. Candelaria Rodriges, not taking cpap  SOB (shortness of breath) Past Surgical History:  
Procedure Laterality Date  HX HYSTERECTOMY  HX ORTHOPAEDIC    
 thumb and left foot Social History Social History  Marital status:  Spouse name: N/A  
 Number of children: N/A  
 Years of education: N/A Social History Main Topics  Smoking status: Never Smoker  Smokeless tobacco: Never Used    Comment: passive smoke with   Alcohol use Yes Comment: rare  Drug use: No  
 Sexual activity: No  
   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   
  colon ca  Diabetes Maternal Grandmother 61  Cancer Paternal Grandmother   
  intestinal ca  
 Heart Attack Paternal Grandmother  Stroke Paternal Grandmother  Heart Attack Paternal Grandfather  Stroke Paternal Grandfather Current Outpatient Prescriptions Medication Sig Dispense Refill  lisinopril (PRINIVIL, ZESTRIL) 20 mg tablet TAKE 1.5  TABLET BY MOUTH  DAILY, 30 mg 135 Tab 1  
 hydroCHLOROthiazide (HYDRODIURIL) 12.5 mg tablet TAKE 1 TABLET BY MOUTH  DAILY 90 Tab 0  
 atorvastatin (LIPITOR) 20 mg tablet TAKE 1 TABLET BY MOUTH  DAILY 90 Tab 0  
 amitriptyline (ELAVIL) 10 mg tablet TAKE 1 TABLET BY MOUTH  NIGHTLY 90 Tab 0  
 nebivolol (BYSTOLIC) 5 mg tablet Take 1 tablet by mouth daily. 30 tablet 0  
 MULTIVITAMIN W-MINERALS/LUTEIN (CENTRUM SILVER PO) Take  by mouth.  Cholecalciferol, Vitamin D3, (VITAMIN D3) 1,000 unit cap Take 2,000 Units by mouth.  dicyclomine (BENTYL) 10 mg capsule Take 1 Cap by mouth four (4) times daily. X 5 days 40 Cap 1  cyclobenzaprine (FLEXERIL) 10 mg tablet Take 1 Tab by mouth nightly as needed for Muscle Spasm(s). 20 Tab 0  
 ondansetron (ZOFRAN ODT) 4 mg disintegrating tablet Take 1 Tab by mouth every eight (8) hours as needed for Nausea. 12 Tab 0  
 famotidine (PEPCID) 20 mg tablet Take 1 Tab by mouth two (2) times a day. 20 Tab 0  
 diclofenac (VOLTAREN) 1 % topical gel Apply 1 g to affected area four (4) times daily. 20 g 1  
 aspirin 81 mg tablet Take  by mouth. Allergies Allergen Reactions  Codeine Palpitations  Levofloxacin Other (comments)  Prednisone Palpitations  Vioxx [Rofecoxib] Palpitations Review of Systems - General ROS: positive for  - sleep disturbance 
negative for - fatigue, fever or hot flashes Cardiovascular ROS: no chest pain or dyspnea on exertion Respiratory ROS: no cough, shortness of breath, or wheezing Visit Vitals  /84 (BP 1 Location: Left arm, BP Patient Position: Sitting)  Pulse 64  Temp 98.1 °F (36.7 °C) (Oral)  Resp 18  Ht 5' (1.524 m)  Wt 129 lb (58.5 kg)  SpO2 96%  BMI 25.19 kg/m2 General Appearance:  Well developed, well nourished,alert and oriented x 3, and individual in no acute distress. Ears/Nose/Mouth/Throat:   Hearing grossly normal. 
  
    Neck: Supple, no lad, no bruits Chest:   Lungs clear to auscultation bilaterally. Cardiovascular:  Regular rate and rhythm, S1, S2 normal, no murmur. Abdomen:   Soft, non-tender, bowel sounds are active. Extremities: No edema bilaterally. Left hip FROM, right hip FROM except for flexion and internal rotation of femur into pelvis Skin: Warm and dry, no suspicious lesions Gyn: no masses in pelvic vault, + pain on palpation of right >left superior pubic ramus Diagnoses and all orders for this visit: 1. Controlled type 2 diabetes mellitus without complication, with long-term current use of insulin ([address] Encouraged continued heart healthy diet and exercise as tolerated with knee 
-     HEMOGLOBIN A1C WITH EAG 3. Essential hypertension Not controlled on recheck 156/90 She reports stress with upcoming dental procedure She will check her bp and if consistently >140/90 we will increase her lisinpril to 30 mg as she is on 20 mg now 
-     lisinopril (PRINIVIL, ZESTRIL) 20 mg tablet; TAKE 1.5  TABLET BY MOUTH  DAILY, 30 mg 
-     METABOLIC PANEL, COMPREHENSIVE 4. Fibromyalgia Cont elevil for now No se 
 
 
This note will not be viewable in [address] adolfo hung in 3 months for bp check Labs today

## 2018-08-03 LAB
ALBUMIN SERPL-MCNC: 4.2 G/DL (ref 3.5–4.8)
ALBUMIN/GLOB SERPL: 1.7 {RATIO} (ref 1.2–2.2)
ALP SERPL-CCNC: 58 IU/L (ref 39–117)
ALT SERPL-CCNC: 21 IU/L (ref 0–32)
AST SERPL-CCNC: 26 IU/L (ref 0–40)
BILIRUB SERPL-MCNC: 0.3 MG/DL (ref 0–1.2)
BUN SERPL-MCNC: 13 MG/DL (ref 8–27)
BUN/CREAT SERPL: 18 (ref 12–28)
CALCIUM SERPL-MCNC: 9.5 MG/DL (ref 8.7–10.3)
CHLORIDE SERPL-SCNC: 101 MMOL/L (ref 96–106)
CO2 SERPL-SCNC: 25 MMOL/L (ref 20–29)
CREAT SERPL-MCNC: 0.74 MG/DL (ref 0.57–1)
EST. AVERAGE GLUCOSE BLD GHB EST-MCNC: 123 MG/DL
GLOBULIN SER CALC-MCNC: 2.5 G/DL (ref 1.5–4.5)
GLUCOSE SERPL-MCNC: 77 MG/DL (ref 65–99)
HBA1C MFR BLD: 5.9 % (ref 4.8–5.6)
POTASSIUM SERPL-SCNC: 4 MMOL/L (ref 3.5–5.2)
PROT SERPL-MCNC: 6.7 G/DL (ref 6–8.5)
SODIUM SERPL-SCNC: 141 MMOL/L (ref 134–144)

## 2018-08-03 NOTE — ACP (ADVANCE CARE PLANNING)
NN discussed with patient about advance medical directive & showed patient documentation. Patient respectfully declined paperwork, but will consider for the future. Patient states that her oldest son, Malgorzata Perkins, is her medical Power of [employer].

## 2018-08-03 NOTE — PROGRESS NOTES
Nurse Navigator Medicare Wellness Visit performed by SHILA Hamlin This is the Subsequent Medicare Annual Wellness Exam, performed 12 months or more after the Initial AWV or the last Subsequent AWV I have reviewed the patient's medical history in detail and updated the computerized patient record. History Past Medical History:  
Diagnosis Date  Essential hypertension, benign  Fibromyalgia  Myalgia and myositis, unspecified  Other and unspecified hyperlipidemia 10/12/2010  Palpitations 10/12/2010 Dr. Raf Rivera  Sleep apnea Dr. Kyle Jacques, not taking cpap  SOB (shortness of breath) Past Surgical History:  
Procedure Laterality Date  HX HYSTERECTOMY  HX ORTHOPAEDIC    
 thumb and left foot Current Outpatient Prescriptions Medication Sig Dispense Refill  lisinopril (PRINIVIL, ZESTRIL) 20 mg tablet TAKE 1.5  TABLET BY MOUTH  DAILY, 30 mg 135 Tab 1  
 hydroCHLOROthiazide (HYDRODIURIL) 12.5 mg tablet TAKE 1 TABLET BY MOUTH  DAILY 90 Tab 0  
 atorvastatin (LIPITOR) 20 mg tablet TAKE 1 TABLET BY MOUTH  DAILY 90 Tab 0  
 amitriptyline (ELAVIL) 10 mg tablet TAKE 1 TABLET BY MOUTH  NIGHTLY 90 Tab 0  
 nebivolol (BYSTOLIC) 5 mg tablet Take 1 tablet by mouth daily. 30 tablet 0  
 MULTIVITAMIN W-MINERALS/LUTEIN (CENTRUM SILVER PO) Take  by mouth.  Cholecalciferol, Vitamin D3, (VITAMIN D3) 1,000 unit cap Take 2,000 Units by mouth.  dicyclomine (BENTYL) 10 mg capsule Take 1 Cap by mouth four (4) times daily. X 5 days 40 Cap 1  cyclobenzaprine (FLEXERIL) 10 mg tablet Take 1 Tab by mouth nightly as needed for Muscle Spasm(s). 20 Tab 0  
 ondansetron (ZOFRAN ODT) 4 mg disintegrating tablet Take 1 Tab by mouth every eight (8) hours as needed for Nausea. 12 Tab 0  
 famotidine (PEPCID) 20 mg tablet Take 1 Tab by mouth two (2) times a day. 20 Tab 0  
 diclofenac (VOLTAREN) 1 % topical gel Apply 1 g to affected area four (4) times daily. 20 g 1  aspirin 81 mg tablet Take  by mouth. Allergies Allergen Reactions  Codeine Palpitations  Levofloxacin Other (comments)  Prednisone Palpitations  Vioxx [Rofecoxib] Palpitations Family History Problem Relation Age of Onset  Diabetes Mother 61  Cancer Maternal Grandmother   
  colon ca  Diabetes Maternal Grandmother 61  Cancer Paternal Grandmother   
  intestinal ca  
 Heart Attack Paternal Grandmother  Stroke Paternal Grandmother  Heart Attack Paternal Grandfather  Stroke Paternal Grandfather Social History Substance Use Topics  Smoking status: Never Smoker  Smokeless tobacco: Never Used Comment: passive smoke with   Alcohol use Yes Comment: rare Patient Active Problem List  
Diagnosis Code  Essential hypertension, benign I10  
 Other and unspecified hyperlipidemia E78.5  Palpitations R00.2  Obstructive sleep apnea (adult) (pediatric) G47.33  
 SOB (shortness of breath) R06.02  
 Myalgia and myositis, unspecified [id number]  Fibromyalgia M79.7  Sleep apnea G47.30  Advanced care planning/counseling discussion Z71.89 Depression Risk Factor Screening:  
Patient denies feelings of being down, depressed or hopeless at this time. Patient states that they have a strong support system within their family & friends. PHQ over the last two weeks 8/2/2018 Little interest or pleasure in doing things Not at all Feeling down, depressed, irritable, or hopeless Not at all Total Score PHQ 2 0 Alcohol Risk Factor Screening: You do not drink alcohol or very rarely. Functional Ability and Level of Safety:  
Hearing Loss The patient wears hearing aids. Activities of Daily Living The home contains: no safety equipment. Patient does total self care Patient states that she lives in a private residence with her grandson, his wife, 3 great grand-daughters & their 2 little dogs.  Patient shares that she enjoys time with the family & her great grandchildren keep her very busy. Patient adds that she also has an 7 month old great grandson that lives locally. Patient states independence in all ADLs & denies the use of assistive devices for ambulation. NN encouraged patient to continue and/ or introduce routine physical exercise into their daily routine as applicable & as recommended by PCP. Patient verbalized understanding & agreement to take this into consideration. ADL Assessment 8/3/2018 Feeding yourself No Help Needed Getting from bed to chair No Help Needed Getting dressed No Help Needed Bathing or showering No Help Needed Walk across the room (includes cane/walker) No Help Needed Using the telphone No Help Needed Taking your medications No Help Needed Preparing meals No Help Needed Managing money (expenses/bills) No Help Needed Moderately strenuous housework (laundry) No Help Needed Shopping for personal items (toiletries/medicines) No Help Needed Shopping for groceries No Help Needed Driving No Help Needed Climbing a flight of stairs No Help Needed Getting to places beyond walking distances No Help Needed Patient denies falls within the past year & verbalizes awareness of fall prevention strategies. Fall Risk Fall Risk Assessment, last 12 mths 8/2/2018 Able to walk? Yes Fall in past 12 months? No  
Fall with injury? -  
Number of falls in past 12 months - Fall Risk Score -  
 
 
Abuse Screen Patient is not abused Abuse Screening Questionnaire 8/3/2018 Do you ever feel afraid of your partner? [place] Are you in a relationship with someone who physically or mentally threatens you? [place] Is it safe for you to go home? [facility] Cognitive Screening Evaluation of Cognitive Function: 
Has your family/caregiver stated any concerns about your memory: no 
 
 
Patient Care Team  
Patient Care Team: 
Tien Mejia MD as PCP - General (Internal Medicine) Assessment/Plan Education and counseling provided: 
Are appropriate based on today's review and evaluation End-of-Life planning (with patient's consent) Pneumococcal Vaccine Influenza Vaccine Screening Mammography Colorectal cancer screening tests Bone mass measurement (DEXA) Screening for glaucoma 
tdap & shingles vaccinations Diagnoses and all orders for this visit: 1. Controlled type 2 diabetes mellitus without complication, with long-term current use of insulin (HCC) 
-     HEMOGLOBIN A1C WITH EAG 2. Elevated glucose 3. Essential hypertension 
-     lisinopril (PRINIVIL, ZESTRIL) 20 mg tablet; TAKE 1.5  TABLET BY MOUTH  DAILY, 30 mg 
-     METABOLIC PANEL, COMPREHENSIVE 4. Fibromyalgia AWV Summary: 1. NN discussed with patient about advance medical directive & showed patient documentation. Patient respectfully declined paperwork, but will consider for the future. Patient states that her oldest son, Marjorie Ying, is her medical Power of [address] [place] [address]. 2. Patient is up to date on the following immunizations: 
Immunization History Administered Date(s) Administered  Influenza High Dose Vaccine PF 10/01/2016, 10/24/2017  Influenza Vaccine 09/30/2015  Pneumococcal Conjugate (PCV-13) 07/01/2016  Pneumococcal Polysaccharide (PPSV-23) 02/08/2013  Tdap 02/14/2017  Zoster Vaccine, Live 06/01/2014 Patient confirmed the aforementioned preventative immunization dates are correct. Today, PCP provided patient with a Shingrix vaccination prescription & patient verbalized awareness that this vaccine can be obtained at a local pharmacy. PCP informed patient of vaccine details. Patient's health maintenance immunization record has been updated & is current.   
 
3. Patient states that she follows the PCP's recommendations for the following screenings: Mammography (report on file from 7/25/2018), pap/ pelvic, DEXA scan (report on file from 8/2017) & colonoscopy (report on file from 4/13/2018 performed by Dr. James Hernandez at [facility] with no recommended follow-up screening needed due to patient's age). Patient confirmed the aforementioned preventative screening dates. 4. Patient wears corrective lenses. Patient reports having a routine eye exam & glaucoma screening within the last year performed by an ophthalmologist at the [facility]. [facility] faxed requesting a copy of patient's last eye exam with glaucoma screening with patient's verbal approval. Patient shares that she has an upcoming eye exam scheduled in October 2018. Patient verbalized understanding of all information discussed. Patient was given the opportunity to ask questions. Medication reconciliation completed by MA/ LPN and reviewed by PCP. Patient provided AVS, either a printed version or electronic version in [employer], which includes Medicare Wellness Preventative Screening Table. Health Maintenance Due Topic Date Due  
 BREAST CANCER SCRN MAMMOGRAM  06/09/2018  GLAUCOMA SCREENING Q2Y  07/28/2018  Influenza Age 5 to Adult  08/01/2018

## 2018-09-24 RX ORDER — ATORVASTATIN CALCIUM 20 MG/1
TABLET, FILM COATED ORAL
Qty: 90 TAB | Refills: 0 | Status: SHIPPED | OUTPATIENT
Start: 2018-09-24 | End: 2018-12-13 | Stop reason: SDUPTHER

## 2018-10-05 RX ORDER — HYDROCHLOROTHIAZIDE 12.5 MG/1
TABLET ORAL
Qty: 90 TAB | Refills: 1 | Status: SHIPPED | OUTPATIENT
Start: 2018-10-05 | End: 2019-03-12 | Stop reason: SDUPTHER

## 2018-12-31 DIAGNOSIS — I10 ESSENTIAL HYPERTENSION: ICD-10-CM

## 2018-12-31 RX ORDER — LISINOPRIL 20 MG/1
TABLET ORAL
Qty: 90 TAB | Refills: 0 | Status: SHIPPED | OUTPATIENT
Start: 2018-12-31 | End: 2019-03-14 | Stop reason: SDUPTHER

## 2019-02-21 RX ORDER — AMITRIPTYLINE HYDROCHLORIDE 10 MG/1
10 TABLET, FILM COATED ORAL
Qty: 90 TAB | Refills: 1 | Status: SHIPPED | OUTPATIENT
Start: 2019-02-21 | End: 2019-11-07

## 2019-02-25 ENCOUNTER — DOCUMENTATION ONLY (OUTPATIENT)
Dept: FAMILY MEDICINE CLINIC | Age: 79
End: 2019-02-25

## 2019-02-25 RX ORDER — ATORVASTATIN CALCIUM 20 MG/1
TABLET, FILM COATED ORAL
Qty: 90 TAB | Refills: 0 | Status: SHIPPED | OUTPATIENT
Start: 2019-02-25 | End: 2019-05-07 | Stop reason: SDUPTHER

## 2019-03-14 DIAGNOSIS — I10 ESSENTIAL HYPERTENSION: ICD-10-CM

## 2019-03-17 RX ORDER — LISINOPRIL 20 MG/1
TABLET ORAL
Qty: 90 TAB | Refills: 0 | Status: SHIPPED | OUTPATIENT
Start: 2019-03-17 | End: 2019-05-27 | Stop reason: SDUPTHER

## 2019-03-30 DIAGNOSIS — K57.30 DIVERTICULOSIS OF LARGE INTESTINE WITHOUT HEMORRHAGE: ICD-10-CM

## 2019-04-01 RX ORDER — DICYCLOMINE HYDROCHLORIDE 10 MG/1
10 CAPSULE ORAL 4 TIMES DAILY
Qty: 40 CAP | Refills: 1 | Status: SHIPPED | OUTPATIENT
Start: 2019-04-01 | End: 2019-09-10 | Stop reason: SDUPTHER

## 2019-05-08 RX ORDER — ATORVASTATIN CALCIUM 20 MG/1
TABLET, FILM COATED ORAL
Qty: 90 TAB | Refills: 0 | Status: SHIPPED | OUTPATIENT
Start: 2019-05-08 | End: 2019-07-18 | Stop reason: SDUPTHER

## 2019-05-08 NOTE — TELEPHONE ENCOUNTER
I called pt, no answer. LM on VM that rx has been sent to the pharmacy. Pt needs to call back to schedule an appt with pcp before any additional refills.

## 2019-05-09 ENCOUNTER — TELEPHONE (OUTPATIENT)
Dept: INTERNAL MEDICINE CLINIC | Age: 79
End: 2019-05-09

## 2019-05-09 NOTE — TELEPHONE ENCOUNTER
----- Message from Daniel Henning sent at 5/9/2019  9:31 AM EDT -----  Regarding: DR Blaire Trejo / TELEPHONE   Pt is returning call to office. Requesting communication through e-mail.    Best contact: [phone]

## 2019-05-22 ENCOUNTER — TELEPHONE (OUTPATIENT)
Dept: INTERNAL MEDICINE CLINIC | Age: 79
End: 2019-05-22

## 2019-05-22 NOTE — TELEPHONE ENCOUNTER
Patient having neck pain on one side for the past few days that is radiating down to shoulders and also experiencing headaches. There are no opening today or tomorrow. Please advise.     Best contact: [phone]

## 2019-05-22 NOTE — TELEPHONE ENCOUNTER
Returned call to pt, I advised her that we do not have any appts available in the office today but offered [facility]. Pt states she will think about it but disconnected the phone before I could give her the phone number.

## 2019-05-24 RX ORDER — HYDROCHLOROTHIAZIDE 12.5 MG/1
TABLET ORAL
Qty: 90 TAB | Refills: 0 | Status: SHIPPED | OUTPATIENT
Start: 2019-05-24 | End: 2019-08-03 | Stop reason: SDUPTHER

## 2019-05-27 DIAGNOSIS — I10 ESSENTIAL HYPERTENSION: ICD-10-CM

## 2019-05-28 ENCOUNTER — OFFICE VISIT (OUTPATIENT)
Dept: INTERNAL MEDICINE CLINIC | Age: 79
End: 2019-05-28

## 2019-05-28 VITALS
HEART RATE: 67 BPM | RESPIRATION RATE: 18 BRPM | TEMPERATURE: 97.6 F | SYSTOLIC BLOOD PRESSURE: 170 MMHG | HEIGHT: 60 IN | BODY MASS INDEX: 26.7 KG/M2 | WEIGHT: 136 LBS | DIASTOLIC BLOOD PRESSURE: 91 MMHG | OXYGEN SATURATION: 96 %

## 2019-05-28 DIAGNOSIS — M54.2 NECK PAIN: ICD-10-CM

## 2019-05-28 DIAGNOSIS — I10 ESSENTIAL HYPERTENSION: Primary | ICD-10-CM

## 2019-05-28 DIAGNOSIS — R73.09 ELEVATED GLUCOSE: ICD-10-CM

## 2019-05-28 DIAGNOSIS — K59.09 OTHER CONSTIPATION: ICD-10-CM

## 2019-05-28 DIAGNOSIS — D72.829 LEUKOCYTOSIS, UNSPECIFIED TYPE: ICD-10-CM

## 2019-05-28 DIAGNOSIS — E55.9 VITAMIN D DEFICIENCY: ICD-10-CM

## 2019-05-28 DIAGNOSIS — E78.2 MIXED HYPERLIPIDEMIA: ICD-10-CM

## 2019-05-28 RX ORDER — LISINOPRIL 2.5 MG/1
2.5 TABLET ORAL DAILY
Qty: 30 TAB | Refills: 0 | Status: SHIPPED | OUTPATIENT
Start: 2019-05-28 | End: 2019-09-10 | Stop reason: DRUGHIGH

## 2019-05-28 RX ORDER — LISINOPRIL 20 MG/1
TABLET ORAL
Qty: 90 TAB | Refills: 0 | Status: SHIPPED | OUTPATIENT
Start: 2019-05-28 | End: 2019-08-07 | Stop reason: SDUPTHER

## 2019-05-28 RX ORDER — CYCLOBENZAPRINE HCL 5 MG
5 TABLET ORAL
Qty: 20 TAB | Refills: 0 | Status: SHIPPED | OUTPATIENT
Start: 2019-05-28 | End: 2020-12-04 | Stop reason: ALTCHOICE

## 2019-05-28 NOTE — PROGRESS NOTES
Chief Complaint   Patient presents with    Blood Pressure Check    Neck Pain     Patient here for evaluation of her neck pain and constipation as well as blood pressure check. Neck pain  Pt reports left neck pain, intermittent flares but constantly there. She reports it is related to movement and activity. She notes better when resting but does not resolve worse with vacuuming activity. Flares 10/10 pain. Ibuprofen x 1 with mild allevatiation. She has had daily for 2 weeks. She has not muscle relaxant. No knumbness or tingling, no new bladder issues. Patient reports it radiates from her left neck down into her left scapula. She denies moving lifting or carrying anything heavy. She reports it slowly came on and has persisted for the past 2 weeks      Constipated  She has not tried colace  Hx of progression of sx over 6 months  4/13/2018 colonoscopy by Dr. Tootie Fenton. She had a normal colonoscopy      Subjective:   Chioma Orta is a [de-identified] y.o. female with hypertension. Hypertension ROS: taking medications as instructed, no medication side effects noted, no TIA's, no chest pain on exertion, no dyspnea on exertion, no swelling of ankles. New concerns: Patient reports she has been checking her blood pressure over the past 3 days and it has been in the 1 50-1 60 range. She reports that she was told by her cardiologist that she should not increase her lisinopril as it might be too strong for her. Fibromyalgia  She is still taking amitriptyline to help her sleep. She stopped her lexapro and feels she has lost some weight with coming off this medication. Osteoporosis  Did not discuss      Diabetes/elevated blood sugar  Diet controlled. She is not on bs meds.   Patient is interested in getting her blood sugar level checked again          Past Medical History:   Diagnosis Date    Essential hypertension, benign     Fibromyalgia     Myalgia and myositis, unspecified     Other and unspecified hyperlipidemia 10/12/2010    Palpitations 10/12/2010    Dr. Martha Kitchen Sleep apnea     Dr. Clif Bruno, not taking cpap    SOB (shortness of breath)      Past Surgical History:   Procedure Laterality Date    HX HYSTERECTOMY      HX ORTHOPAEDIC      thumb and left foot     Social History     Socioeconomic History    Marital status:      Spouse name: Not on file    Number of children: Not on file    Years of education: Not on file    Highest education level: Not on file   Tobacco Use    Smoking status: Never Smoker    Smokeless tobacco: Never Used    Tobacco comment: passive smoke with    Substance and Sexual Activity    Alcohol use: Yes     Comment: rare    Drug use: No    Sexual activity: Never     Comment:  for 47 years   Social History Narrative        Retired:  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,    Retired 10 years            3 children: overweight son, smoker son 48 and 46,     Daughter healthy     Family History   Problem Relation Age of Onset    Diabetes Mother 61    Cancer Maternal Grandmother         colon ca    Diabetes Maternal Grandmother 61    Cancer Paternal Grandmother         intestinal ca    Heart Attack Paternal Grandmother     Stroke Paternal Grandmother     Heart Attack Paternal Grandfather     Stroke Paternal Grandfather      Current Outpatient Medications   Medication Sig Dispense Refill    vit C/E/Zn/coppr/lutein/zeaxan (PRESERVISION AREDS-2 PO) Take  by mouth.  hydroCHLOROthiazide (HYDRODIURIL) 12.5 mg tablet TAKE 1 TABLET BY MOUTH  DAILY 90 Tab 0    atorvastatin (LIPITOR) 20 mg tablet TAKE 1 TABLET BY MOUTH  DAILY 90 Tab 0    lisinopril (PRINIVIL, ZESTRIL) 20 mg tablet TAKE 1 TABLET BY MOUTH  DAILY 90 Tab 0    amitriptyline (ELAVIL) 10 mg tablet Take 1 Tab by mouth nightly. 90 Tab 1    diclofenac (VOLTAREN) 1 % topical gel Apply 1 g to affected area four (4) times daily.  20 g 1    nebivolol (BYSTOLIC) 5 mg tablet Take 1 tablet by mouth daily. 30 tablet 0    MULTIVITAMIN W-MINERALS/LUTEIN (CENTRUM SILVER PO) Take  by mouth.  Cholecalciferol, Vitamin D3, (VITAMIN D3) 1,000 unit cap Take 2,000 Units by mouth.  dicyclomine (BENTYL) 10 mg capsule Take 1 Cap by mouth four (4) times daily. X 5 days 40 Cap 1    halobetasol (ULTRAVATE) 0.05 % topical cream Apply  to affected area two (2) times a day. use thin layer do not exceed 7 days. 15 g 0    cyclobenzaprine (FLEXERIL) 10 mg tablet Take 1 Tab by mouth nightly as needed for Muscle Spasm(s). 20 Tab 0    ondansetron (ZOFRAN ODT) 4 mg disintegrating tablet Take 1 Tab by mouth every eight (8) hours as needed for Nausea. 12 Tab 0    famotidine (PEPCID) 20 mg tablet Take 1 Tab by mouth two (2) times a day. 20 Tab 0     Allergies   Allergen Reactions    Codeine Palpitations    Levofloxacin Other (comments)    Prednisone Palpitations    Vioxx [Rofecoxib] Palpitations       Review of Systems - General ROS: positive for  - sleep disturbance  negative for - fatigue, fever or hot flashes  Cardiovascular ROS: no chest pain or dyspnea on exertion  Respiratory ROS: no cough, shortness of breath, or wheezing    Visit Vitals  BP (!) 170/91 (BP 1 Location: Left arm, BP Patient Position: Sitting)   Pulse 67   Temp 97.6 °F (36.4 °C) (Oral)   Resp 18   Ht 5' (1.524 m)   Wt 136 lb (61.7 kg)   SpO2 96%   BMI 26.56 kg/m²     General Appearance:  Well developed, well nourished,alert and oriented x 3, and individual in no acute distress. Ears/Nose/Mouth/Throat:   Hearing grossly normal.         Neck: Supple, no lad, no bruits   Chest:   Lungs clear to auscultation bilaterally. Cardiovascular:  Regular rate and rhythm, S1, S2 normal, no murmur. Abdomen:   Soft, non-tender, bowel sounds are active. Extremities: No edema bilaterally.  Left hip FROM, right hip FROM except for flexion and internal rotation of femur into pelvis   Skin: Warm and dry, no suspicious lesions  Gyn: no masses in pelvic vault, + pain on palpation of right >left superior pubic ramus                 Diagnoses and all orders for this visit:    1. Essential hypertension  Not controlled  On recheck her blood pressure was 170/90 bilateral arms  I think her neck pain and lack of sleep is definitely contributing to her blood pressure however it needs to be controlled. I will add  2.5 mg of lisinopril to her regimen and told her that we can increase this to 5 mg if she tolerates it. I discussed the side effects of too strong of blood pressure medicine so she will be very cautious and careful when she is getting up for standing or changing positions. She will follow-up with me in 2 weeks. -     lisinopril (PRINIVIL, ZESTRIL) 2.5 mg tablet; Take 1 Tab by mouth daily. Add to 20 mg po daily    2. Neck pain  Not controlled  She has been on steroids in the past however I think she needs to try Tylenol and if no improvement can add on Flexeril. If needed for the progression of symptoms we may need to reassess steroid use. -     cyclobenzaprine (FLEXERIL) 5 mg tablet; Take 1 Tab by mouth nightly. Do not drive while taking medication. 3. Elevated glucose  Monitor  -     METABOLIC PANEL, COMPREHENSIVE  -     HEMOGLOBIN A1C WITH EAG    4. Mixed hyperlipidemia  Continue cholesterol medicine for now  -     LIPID PANEL    5. Vitamin D deficiency  -     VITAMIN D, 25 HYDROXY    6. Leukocytosis, unspecified type  -     CBC W/O DIFF    7.  Other constipation  -     TSH 3RD GENERATION      Follow-up in 2 to 3 weeks for blood pressure check

## 2019-05-31 LAB
25(OH)D3+25(OH)D2 SERPL-MCNC: 33.3 NG/ML (ref 30–100)
ALBUMIN SERPL-MCNC: 4.4 G/DL (ref 3.5–4.7)
ALBUMIN/GLOB SERPL: 1.9 {RATIO} (ref 1.2–2.2)
ALP SERPL-CCNC: 56 IU/L (ref 39–117)
ALT SERPL-CCNC: 24 IU/L (ref 0–32)
AST SERPL-CCNC: 24 IU/L (ref 0–40)
BILIRUB SERPL-MCNC: 0.5 MG/DL (ref 0–1.2)
BUN SERPL-MCNC: 15 MG/DL (ref 8–27)
BUN/CREAT SERPL: 18 (ref 12–28)
CALCIUM SERPL-MCNC: 9.8 MG/DL (ref 8.7–10.3)
CHLORIDE SERPL-SCNC: 99 MMOL/L (ref 96–106)
CHOLEST SERPL-MCNC: 166 MG/DL (ref 100–199)
CO2 SERPL-SCNC: 27 MMOL/L (ref 20–29)
CREAT SERPL-MCNC: 0.84 MG/DL (ref 0.57–1)
ERYTHROCYTE [DISTWIDTH] IN BLOOD BY AUTOMATED COUNT: 14.9 % (ref 12.3–15.4)
EST. AVERAGE GLUCOSE BLD GHB EST-MCNC: 123 MG/DL
GLOBULIN SER CALC-MCNC: 2.3 G/DL (ref 1.5–4.5)
GLUCOSE SERPL-MCNC: 88 MG/DL (ref 65–99)
HBA1C MFR BLD: 5.9 % (ref 4.8–5.6)
HCT VFR BLD AUTO: 41 % (ref 34–46.6)
HDLC SERPL-MCNC: 56 MG/DL
HGB BLD-MCNC: 13.7 G/DL (ref 11.1–15.9)
INTERPRETATION, 910389: NORMAL
LDLC SERPL CALC-MCNC: 90 MG/DL (ref 0–99)
MCH RBC QN AUTO: 29.3 PG (ref 26.6–33)
MCHC RBC AUTO-ENTMCNC: 33.4 G/DL (ref 31.5–35.7)
MCV RBC AUTO: 88 FL (ref 79–97)
PLATELET # BLD AUTO: 232 X10E3/UL (ref 150–450)
POTASSIUM SERPL-SCNC: 4.3 MMOL/L (ref 3.5–5.2)
PROT SERPL-MCNC: 6.7 G/DL (ref 6–8.5)
RBC # BLD AUTO: 4.68 X10E6/UL (ref 3.77–5.28)
SODIUM SERPL-SCNC: 141 MMOL/L (ref 134–144)
TRIGL SERPL-MCNC: 98 MG/DL (ref 0–149)
TSH SERPL DL<=0.005 MIU/L-ACNC: 2.23 UIU/ML (ref 0.45–4.5)
VLDLC SERPL CALC-MCNC: 20 MG/DL (ref 5–40)
WBC # BLD AUTO: 6.6 X10E3/UL (ref 3.4–10.8)

## 2019-06-14 ENCOUNTER — OFFICE VISIT (OUTPATIENT)
Dept: INTERNAL MEDICINE CLINIC | Age: 79
End: 2019-06-14

## 2019-06-14 VITALS
OXYGEN SATURATION: 96 % | WEIGHT: 136.25 LBS | SYSTOLIC BLOOD PRESSURE: 152 MMHG | HEART RATE: 63 BPM | DIASTOLIC BLOOD PRESSURE: 82 MMHG | TEMPERATURE: 97.9 F | BODY MASS INDEX: 26.75 KG/M2 | RESPIRATION RATE: 18 BRPM | HEIGHT: 60 IN

## 2019-06-14 DIAGNOSIS — M54.2 NECK PAIN: Primary | ICD-10-CM

## 2019-06-14 DIAGNOSIS — I10 ESSENTIAL HYPERTENSION: ICD-10-CM

## 2019-06-14 RX ORDER — DIAZEPAM 2 MG/1
TABLET ORAL
Qty: 12 TAB | Refills: 0 | Status: SHIPPED | OUTPATIENT
Start: 2019-06-14 | End: 2019-09-10 | Stop reason: ALTCHOICE

## 2019-06-14 NOTE — PROGRESS NOTES
Chief Complaint   Patient presents with    Hypertension       Neck pain  Patient presents for follow-up since she has not been doing better Since Her Last Visit. She reports she has tried the Flexeril in the evening but it has not helped her with her pain. She reports she still has difficulty sleeping. She is not taking any medicine during the daytime. Her symptoms are now been going on for the past month. She notes it is clearly affecting her quality of life. She cannot garden and cannot be active with her other family members. Her symptoms are moderate in severity. She has difficulty driving because she cannot turn her neck. She was on steroids before in the past.  She notes that when she went on a long Dosepak it caused her to have palpitations but she did have 3 days which did help her at another event. Subjective:   Julio Mayfield is a [de-identified] y.o. female with hypertension. Hypertension ROS: taking medications as instructed, no medication side effects noted, no TIA's, no chest pain on exertion, no dyspnea on exertion, no swelling of ankles. New concerns: Patient is taking her lisinopril 20 mg and a small dose of 2.5 mg. She reports that for the first 2 days that it made her blood pressure go down but then her blood pressure has been elevated in the 1 40-50 range.            Past Medical History:   Diagnosis Date    Essential hypertension, benign     Fibromyalgia     Myalgia and myositis, unspecified     Other and unspecified hyperlipidemia 10/12/2010    Palpitations 10/12/2010    Dr. Tasia Trevino Sleep apnea     Dr. Ascencion Aparicio, not taking cpap    SOB (shortness of breath)      Past Surgical History:   Procedure Laterality Date    HX HYSTERECTOMY      HX ORTHOPAEDIC      thumb and left foot     Social History     Socioeconomic History    Marital status:      Spouse name: Not on file    Number of children: Not on file    Years of education: Not on file    Highest education level: Not on file   Tobacco Use    Smoking status: Never Smoker    Smokeless tobacco: Never Used    Tobacco comment: passive smoke with    Substance and Sexual Activity    Alcohol use: Yes     Comment: rare    Drug use: No    Sexual activity: Never     Comment:  for 47 years   Social History Narrative        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,    Retired 10 years            3 children: overweight son, smoker son 48 and 46,     Daughter healthy     Family History   Problem Relation Age of Onset    Diabetes Mother 61    Cancer Maternal Grandmother         colon ca    Diabetes Maternal Grandmother 61    Cancer Paternal Grandmother         intestinal ca    Heart Attack Paternal Grandmother     Stroke Paternal Grandmother     Heart Attack Paternal Grandfather     Stroke Paternal Grandfather      Current Outpatient Medications   Medication Sig Dispense Refill    diazePAM (VALIUM) 2 mg tablet Take 1-2 tabs twice a day for severe neck pain. DO NOT take with other sed meds or products 12 Tab 0    lisinopril (PRINIVIL, ZESTRIL) 20 mg tablet TAKE 1 TABLET BY MOUTH  DAILY 90 Tab 0    vit C/E/Zn/coppr/lutein/zeaxan (PRESERVISION AREDS-2 PO) Take  by mouth.  lisinopril (PRINIVIL, ZESTRIL) 2.5 mg tablet Take 1 Tab by mouth daily. Add to 20 mg po daily 30 Tab 0    cyclobenzaprine (FLEXERIL) 5 mg tablet Take 1 Tab by mouth nightly. Do not drive while taking medication. 20 Tab 0    hydroCHLOROthiazide (HYDRODIURIL) 12.5 mg tablet TAKE 1 TABLET BY MOUTH  DAILY 90 Tab 0    atorvastatin (LIPITOR) 20 mg tablet TAKE 1 TABLET BY MOUTH  DAILY 90 Tab 0    dicyclomine (BENTYL) 10 mg capsule Take 1 Cap by mouth four (4) times daily. X 5 days 40 Cap 1    amitriptyline (ELAVIL) 10 mg tablet Take 1 Tab by mouth nightly. 90 Tab 1    halobetasol (ULTRAVATE) 0.05 % topical cream Apply  to affected area two (2) times a day. use thin layer do not exceed 7 days.  15 g 0    cyclobenzaprine (FLEXERIL) 10 mg tablet Take 1 Tab by mouth nightly as needed for Muscle Spasm(s). 20 Tab 0    ondansetron (ZOFRAN ODT) 4 mg disintegrating tablet Take 1 Tab by mouth every eight (8) hours as needed for Nausea. 12 Tab 0    famotidine (PEPCID) 20 mg tablet Take 1 Tab by mouth two (2) times a day. 20 Tab 0    diclofenac (VOLTAREN) 1 % topical gel Apply 1 g to affected area four (4) times daily. 20 g 1    nebivolol (BYSTOLIC) 5 mg tablet Take 1 tablet by mouth daily. 30 tablet 0    MULTIVITAMIN W-MINERALS/LUTEIN (CENTRUM SILVER PO) Take  by mouth.  Cholecalciferol, Vitamin D3, (VITAMIN D3) 1,000 unit cap Take 2,000 Units by mouth. Allergies   Allergen Reactions    Codeine Palpitations    Levofloxacin Other (comments)    Prednisone Palpitations    Vioxx [Rofecoxib] Palpitations       Review of Systems - General ROS: positive for  - sleep disturbance  Cardiovascular ROS: no chest pain or dyspnea on exertion  Respiratory ROS: no cough, shortness of breath, or wheezing    Visit Vitals  /82 (BP 1 Location: Left arm, BP Patient Position: Sitting)   Pulse 63   Temp 97.9 °F (36.6 °C) (Oral)   Resp 18   Ht 5' (1.524 m)   Wt 136 lb 4 oz (61.8 kg)   SpO2 96%   BMI 26.61 kg/m²     General Appearance:  Well developed, well nourished,alert and oriented x 3, and individual in no acute distress. Ears/Nose/Mouth/Throat:   Hearing grossly normal.         Neck: Supple, no lad, no bruits   Chest:   Lungs clear to auscultation bilaterally. Cardiovascular:  Regular rate and rhythm, S1, S2 normal, no murmur. Abdomen:   Soft, non-tender, bowel sounds are active. Extremities: No edema bilaterally. Skin: Warm and dry, no suspicious lesions                 Diagnoses and all orders for this visit:    1. Neck pain  Not optimally controlled  Patient did not do well on Flexeril but may have been on a lower dose. Given patient's symptoms we will go ahead and initiate Valium. Side effects discussed.   She will use cautiously  -     diazePAM (VALIUM) 2 mg tablet; Take 1-2 tabs twice a day for severe neck pain. DO NOT take with other sed meds or products  -     XR SPINE CERV 4 OR 5 V; Future    2. Essential hypertension  Not controlled  We will change patient's lisinopril dosing to 20 mg 1.5 tablets p.o. daily if her blood pressure is consistently greater than 140/80. She notes that her grand son-in-law's daughter is a anesthesiologist that she can check blood pressure as well      Follow-up in the next week if symptoms are not improving or if blood pressures not improved.

## 2019-06-20 ENCOUNTER — PATIENT MESSAGE (OUTPATIENT)
Dept: INTERNAL MEDICINE CLINIC | Age: 79
End: 2019-06-20

## 2019-07-19 RX ORDER — ATORVASTATIN CALCIUM 20 MG/1
TABLET, FILM COATED ORAL
Qty: 90 TAB | Refills: 0 | Status: SHIPPED | OUTPATIENT
Start: 2019-07-19 | End: 2019-09-12 | Stop reason: SDUPTHER

## 2019-07-25 ENCOUNTER — OFFICE VISIT (OUTPATIENT)
Dept: INTERNAL MEDICINE CLINIC | Age: 79
End: 2019-07-25

## 2019-07-25 VITALS
DIASTOLIC BLOOD PRESSURE: 77 MMHG | RESPIRATION RATE: 12 BRPM | TEMPERATURE: 97.9 F | BODY MASS INDEX: 27.09 KG/M2 | SYSTOLIC BLOOD PRESSURE: 154 MMHG | HEART RATE: 72 BPM | WEIGHT: 138 LBS | OXYGEN SATURATION: 96 % | HEIGHT: 60 IN

## 2019-07-25 DIAGNOSIS — F43.9 SITUATIONAL STRESS: Primary | ICD-10-CM

## 2019-07-25 DIAGNOSIS — I10 ESSENTIAL HYPERTENSION: ICD-10-CM

## 2019-07-25 RX ORDER — BUSPIRONE HYDROCHLORIDE 5 MG/1
TABLET ORAL
Qty: 60 TAB | Refills: 0 | Status: SHIPPED | OUTPATIENT
Start: 2019-07-25 | End: 2019-09-10
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
## 2019-08-03 RX ORDER — HYDROCHLOROTHIAZIDE 12.5 MG/1
TABLET ORAL
Qty: 90 TAB | Refills: 0 | Status: SHIPPED | OUTPATIENT
Start: 2019-08-03 | End: 2019-09-27 | Stop reason: SDUPTHER

## 2019-09-10 ENCOUNTER — OFFICE VISIT (OUTPATIENT)
Dept: INTERNAL MEDICINE CLINIC | Age: 79
End: 2019-09-10

## 2019-09-10 VITALS
SYSTOLIC BLOOD PRESSURE: 145 MMHG | TEMPERATURE: 98.8 F | HEIGHT: 60 IN | OXYGEN SATURATION: 96 % | BODY MASS INDEX: 26.95 KG/M2 | WEIGHT: 137.25 LBS | HEART RATE: 69 BPM | RESPIRATION RATE: 18 BRPM | DIASTOLIC BLOOD PRESSURE: 78 MMHG

## 2019-09-10 DIAGNOSIS — I10 ESSENTIAL HYPERTENSION: Primary | ICD-10-CM

## 2019-09-10 DIAGNOSIS — F41.9 ANXIETY: ICD-10-CM

## 2019-09-10 DIAGNOSIS — F43.9 SITUATIONAL STRESS: ICD-10-CM

## 2019-09-10 RX ORDER — FAMOTIDINE 20 MG/1
20 TABLET, FILM COATED ORAL
COMMUNITY

## 2019-09-10 RX ORDER — BUSPIRONE HYDROCHLORIDE 5 MG/1
TABLET ORAL
Qty: 360 TAB | Refills: 0 | Status: SHIPPED | OUTPATIENT
Start: 2019-09-10 | End: 2020-03-24 | Stop reason: SDUPTHER

## 2019-09-10 RX ORDER — DICYCLOMINE HYDROCHLORIDE 10 MG/1
10 CAPSULE ORAL
COMMUNITY
End: 2019-11-07

## 2019-09-10 RX ORDER — DICLOFENAC SODIUM 10 MG/G
GEL TOPICAL
COMMUNITY
End: 2022-08-23
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
## 2019-09-13 ENCOUNTER — OFFICE VISIT (OUTPATIENT)
Dept: INTERNAL MEDICINE CLINIC | Age: 79
End: 2019-09-13

## 2019-09-13 DIAGNOSIS — F43.22 ADJUSTMENT DISORDER WITH ANXIETY: Primary | ICD-10-CM

## 2019-09-13 RX ORDER — ATORVASTATIN CALCIUM 20 MG/1
TABLET, FILM COATED ORAL
Qty: 90 TAB | Refills: 0 | Status: SHIPPED | OUTPATIENT
Start: 2019-09-13 | End: 2019-12-06 | Stop reason: SDUPTHER
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
## 2019-09-29 RX ORDER — HYDROCHLOROTHIAZIDE 12.5 MG/1
TABLET ORAL
Qty: 90 TAB | Refills: 0 | Status: SHIPPED | OUTPATIENT
Start: 2019-09-29 | End: 2019-12-23

## 2019-11-07 ENCOUNTER — HOSPITAL ENCOUNTER (EMERGENCY)
Age: 79
Discharge: HOME | End: 2019-11-07
Attending: EMERGENCY MEDICINE
Payer: MEDICARE

## 2019-11-07 ENCOUNTER — APPOINTMENT (OUTPATIENT)
Dept: GENERAL RADIOLOGY | Age: 79
End: 2019-11-07
Attending: PHYSICIAN ASSISTANT
Payer: MEDICARE

## 2019-11-07 VITALS
WEIGHT: 135 LBS | RESPIRATION RATE: 16 BRPM | BODY MASS INDEX: 26.37 KG/M2 | HEART RATE: 95 BPM | TEMPERATURE: 99 F | OXYGEN SATURATION: 96 % | SYSTOLIC BLOOD PRESSURE: 184 MMHG | DIASTOLIC BLOOD PRESSURE: 81 MMHG

## 2019-11-07 DIAGNOSIS — S93.401A SPRAIN OF RIGHT ANKLE, UNSPECIFIED LIGAMENT, INITIAL ENCOUNTER: Primary | ICD-10-CM

## 2019-11-07 PROCEDURE — 99283 EMERGENCY DEPT VISIT LOW MDM: CPT

## 2019-11-07 PROCEDURE — 73610 X-RAY EXAM OF ANKLE: CPT

## 2019-11-07 RX ORDER — BENZONATATE 100 MG/1
100 CAPSULE ORAL
COMMUNITY

## 2019-11-07 RX ORDER — ALBUTEROL SULFATE 90 UG/1
2 AEROSOL, METERED RESPIRATORY (INHALATION)
COMMUNITY
End: 2020-05-11 | Stop reason: ALTCHOICE

## 2019-11-07 NOTE — ED TRIAGE NOTES
Walking yesterday and heard a snap and had sudden pain. Pt states that she rested it since yesterday and pain has only worsened. Rates pain a 10 on 0-10 with ambulation. No obvious deformity. Pain is on lateral side of foot to ankle.   Good pulses

## 2019-11-07 NOTE — DISCHARGE INSTRUCTIONS
We hope that we have addressed all of your medical concerns. The examination and treatment you received in the Emergency Department were for an emergent problem and were not intended as complete care. It is important that you follow up with your healthcare provider(s) for ongoing care. If your symptoms worsen or do not improve as expected, and you are unable to reach your usual health care provider(s), you should return to the Emergency Department. Today's healthcare is undergoing tremendous change, and patient satisfaction surveys are one of the many tools to assess the quality of medical care. You may receive a survey from the [employer] regarding your experience in the Emergency Department. I hope that your experience has been completely positive, particularly the medical care that I provided. As such, please participate in the survey; anything less than excellent does not meet my expectations or intentions. [facility] and [address] [place] [address] participate in nationally recognized quality of care measures. If your blood pressure is greater than 120/80, as reported below, we urge that you seek medical care to address the potential of high blood pressure, commonly known as hypertension. Hypertension can be hereditary or can be caused by certain medical conditions, pain, stress, or \"white coat syndrome. \"       Please make an appointment with your health care provider(s) for follow up of your Emergency Department visit. VITALS:   Patient Vitals for the past 8 hrs:   Temp Pulse Resp BP SpO2   11/07/19 1602 99 °F (37.2 °C) 95 16 184/81 96 %          Thank you for allowing us to provide you with medical care today. We realize that you have many choices for your emergency care needs. Please choose us in the future for any continued health care needs. Dasie Boast Henery Ache, [address].   Office: [phone]            No results found for this or any previous visit (from the past 24 hour(s)). Xr Ankle Rt Min 3 V    Result Date: 11/7/2019  EXAM:  XR ANKLE RT MIN 3 V. INDICATION:  Right ankle pain and swelling. COMPARISON: None. FINDINGS: Three views of the right ankle demonstrate no fracture or disruption of the ankle mortise. No other acute osseous or articular abnormalities are identified. There is soft tissue swelling. IMPRESSION: Soft tissue swelling. No acute osseous abnormality. Patient Education        Ankle Sprain: Care Instructions  Your Care Instructions    An ankle sprain can happen when you twist your ankle. The ligaments that support the ankle can get stretched and torn. Often the ankle is swollen and painful. Ankle sprains may take from several weeks to several months to heal. Usually, the more pain and swelling you have, the more severe your ankle sprain is and the longer it will take to heal. You can heal faster and regain strength in your ankle with good home treatment. It is very important to give your ankle time to heal completely, so that you do not easily hurt your ankle again. Follow-up care is a key part of your treatment and safety. Be sure to make and go to all appointments, and call your doctor if you are having problems. It's also a good idea to know your test results and keep a list of the medicines you take. How can you care for yourself at home? · Prop up your foot on pillows as much as possible for the next 3 days. Try to keep your ankle above the level of your heart. This will help reduce the swelling. · Follow your doctor's directions for wearing a splint or elastic bandage. Wrapping the ankle may help reduce or prevent swelling. · Your doctor may give you a splint, a brace, an air stirrup, or another form of ankle support to protect your ankle until it is healed. Wear it as directed while your ankle is healing.  Do not remove it unless your doctor tells you to. After your ankle has healed, ask your doctor whether you should wear the brace when you exercise. · Put ice or cold packs on your injured ankle for 10 to 20 minutes at a time. Try to do this every 1 to 2 hours for the next 3 days (when you are awake) or until the swelling goes down. Put a thin cloth between the ice and your skin. · You may need to use crutches until you can walk without pain. If you do use crutches, try to bear some weight on your injured ankle if you can do so without pain. This helps the ankle heal.  · Take pain medicines exactly as directed. ? If the doctor gave you a prescription medicine for pain, take it as prescribed. ? If you are not taking a prescription pain medicine, ask your doctor if you can take an over-the-counter medicine. · If you have been given ankle exercises to do at home, do them exactly as instructed. These can promote healing and help prevent lasting weakness. When should you call for help? Call your doctor now or seek immediate medical care if:    · Your pain is getting worse.     · Your swelling is getting worse.     · Your splint feels too tight or you are unable to loosen it.    Watch closely for changes in your health, and be sure to contact your doctor if:    · You are not getting better after 1 week. Where can you learn more? Go to http://libby-casey.info/. Enter Y755 in the search box to learn more about \"Ankle Sprain: Care Instructions. \"  Current as of: June 26, 2019  Content Version: 12.2  © 8342-6656 Healthwise, Incorporated. Care instructions adapted under license by [employer] (which disclaims liability or warranty for this information). If you have questions about a medical condition or this instruction, always ask your healthcare professional. [address] any warranty or liability for your use of this information.          Patient Education        Ankle Sprain: Rehab Exercises  Introduction  Here are some examples of exercises for you to try. The exercises may be suggested for a condition or for rehabilitation. Start each exercise slowly. Ease off the exercises if you start to have pain. You will be told when to start these exercises and which ones will work best for you. How to do the exercises  \"Alphabet\" exercise    1. Trace the alphabet with your toe. This helps your ankle move in all directions. Side-to-side knee swing exercise    1. Sit in a chair with your foot flat on the floor. 2. Slowly move your knee from side to side. Keep your foot pressed flat. 3. Continue this exercise for 2 to 3 minutes. Towel curl    1. While sitting, place your foot on a towel on the floor. Scrunch the towel toward you with your toes. 2. Then use your toes to push the towel away from you. 3. To make this exercise more challenging you can put something on the other end of the towel. A can of soup is about the right weight for this. Towel stretch    1. Sit with your legs extended and knees straight. 2. Place a towel around your foot just under the toes. 3. Hold each end of the towel in each hand, with your hands above your knees. 4. Pull back with the towel so that your foot stretches toward you. 5. Hold the position for at least 15 to 30 seconds. 6. Repeat 2 to 4 times a session. Do up to 5 sessions a day. Ankle eversion exercise    1. Start by sitting with your foot flat on the floor. Push your foot outward against a wall or a piece of furniture that doesn't move. Hold for about 6 seconds, and relax. Repeat 8 to 12 times. 2. After you feel comfortable with this, try using rubber tubing looped around the outside of your feet for resistance. Push your foot out to the side against the tubing, and then count to 10 as you slowly bring your foot back to the middle. Repeat 8 to 12 times. Isometric opposition exercises    1.  While sitting, put your feet together flat on the floor.  2. Press your injured foot inward against your other foot. Hold for about 6 seconds, and relax. Repeat 8 to 12 times. 3. Then place the heel of your other foot on top of the injured one. Push down with the top heel while trying to push up with your injured foot. Hold for about 6 seconds, and relax. Repeat 8 to 12 times. Resisted ankle inversion    1. Sit on the floor with your good leg crossed over your other leg. 2. Hold both ends of an exercise band and loop the band around the inside of your affected foot. Then press your other foot against the band. 3. Keeping your legs crossed, slowly push your affected foot against the band so that foot moves away from your other foot. Then slowly relax. 4. Repeat 8 to 12 times. Resisted ankle eversion    1. Sit on the floor with your legs straight. 2. Hold both ends of an exercise band and loop the band around the outside of your affected foot. Then press your other foot against the band. 3. Keeping your leg straight, slowly push your affected foot outward against the band and away from your other foot without letting your leg rotate. Then slowly relax. 4. Repeat 8 to 12 times. Resisted ankle dorsiflexion    1. Tie the ends of an exercise band together to form a loop. Attach one end of the loop to a secure object or shut a door on it to hold it in place. (Or you can have someone hold one end of the loop to provide resistance.)  2. While sitting on the floor or in a chair, loop the other end of the band over the top of your affected foot. 3. Keeping your knee and leg straight, slowly flex your foot to pull back on the exercise band, and then slowly relax. 4. Repeat 8 to 12 times. Single-leg balance    1. Stand on a flat surface with your arms stretched out to your sides like you are making the letter \"T. \" Then lift your good leg off the floor, bending it at the knee.  If you are not steady on your feet, use one hand to hold on to a chair, counter, or wall. 2. Standing on the leg with your affected ankle, keep that knee straight. Try to balance on that leg for up to 30 seconds. Then rest for up to 10 seconds. 3. Repeat 6 to 8 times. 4. When you can balance on your affected leg for 30 seconds with your eyes open, try to balance on it with your eyes closed. 5. When you can do this exercise with your eyes closed for 30 seconds and with ease and no pain, try standing on a pillow or piece of foam, and repeat steps 1 through 4. Follow-up care is a key part of your treatment and safety. Be sure to make and go to all appointments, and call your doctor if you are having problems. It's also a good idea to know your test results and keep a list of the medicines you take. Where can you learn more? Go to http://libby-casey.info/. Carry Reeflorentin in the search box to learn more about \"Ankle Sprain: Rehab Exercises. \"  Current as of: June 26, 2019  Content Version: 12.2  © 4839-4601 [employer]. Care instructions adapted under license by [employer] (which disclaims liability or warranty for this information). If you have questions about a medical condition or this instruction, always ask your healthcare professional. [address] any warranty or liability for your use of this information.

## 2019-11-07 NOTE — ED NOTES
The patient was discharged home  in stable condition. The patient is alert and oriented, in no respiratory distress and discharge vital signs obtained. The patient's diagnosis, condition and treatment were explained. The patient expressed understanding. No prescriptions given. No work/school note given. A discharge plan has been developed. A  was not involved in the process. Aftercare instructions were given. Pt ambulatory out of the ED.   Pt discharged from the ED

## 2019-11-07 NOTE — ED PROVIDER NOTES
Angie Castellanos is a [de-identified] y.o. female with hx significant for HTN who presents ambuatlry to ER with c/o right ankle pain since yesterday. Patient states she was walking her dog yesterday when she heard a small \"pop\" with acute onset right lateral ankle pain. States she was concerned she would not be able to make at home secondary to the pain in her right ankle as it is significantly worse with walking and weightbearing however she was able to do so. States the remainder of the afternoon and evening she rested and iced the area without improvement in pain. States she is spent all day today resting and staying off her right leg and ankle and icing without improvement in the symptoms, so she wanted to make sure nothing was broken and came into the ED. No trauma or injury. No other complaints. She specifically denies any fevers, chills, nausea, vomiting, chest pain, shortness of breath, headache, rash, diarrhea, abdominal pain, urinary/bowel changes, sweating or weight loss. PCP: Mika Valladares MD   PMHx significant for: Past Medical History:  No date: Essential hypertension, benign  No date: Fibromyalgia  No date: Myalgia and myositis, unspecified  10/12/2010: Other and unspecified hyperlipidemia  10/12/2010: Palpitations      Comment:  Dr. Kyle Cornell  No date: Sleep apnea      Comment:  Dr. Jeffrey Landau, not taking cpap  No date: SOB (shortness of breath)    PSHx significant for: Past Surgical History:  No date: HX HYSTERECTOMY  No date: HX ORTHOPAEDIC      Comment:  thumb and left foot        -- Codeine -- Palpitations   -- Levofloxacin -- Other (comments)   -- Prednisone -- Palpitations   -- Vioxx (Rofecoxib) -- Palpitations    There are no other complaints, changes or physical findings at this time.               Past Medical History:   Diagnosis Date    Essential hypertension, benign     Fibromyalgia     Myalgia and myositis, unspecified     Other and unspecified hyperlipidemia 10/12/2010    Palpitations 10/12/2010    Dr. Junior An Sleep apnea     Dr. Sukumar Hurst, not taking cpap    SOB (shortness of breath)        Past Surgical History:   Procedure Laterality Date    HX HYSTERECTOMY      HX ORTHOPAEDIC      thumb and left foot         Family History:   Problem Relation Age of Onset    Diabetes Mother 61    Cancer Maternal Grandmother         colon ca    Diabetes Maternal Grandmother 61    Cancer Paternal Grandmother         intestinal ca    Heart Attack Paternal Grandmother     Stroke Paternal Grandmother     Heart Attack Paternal Grandfather     Stroke Paternal Grandfather        Social History     Socioeconomic History    Marital status:      Spouse name: Not on file    Number of children: Not on file    Years of education: Not on file    Highest education level: Not on file   Occupational History    Not on file   Social Needs    Financial resource strain: Not on file    Food insecurity:     Worry: Not on file     Inability: Not on file    Transportation needs:     Medical: Not on file     Non-medical: Not on file   Tobacco Use    Smoking status: Never Smoker    Smokeless tobacco: Never Used    Tobacco comment: passive smoke with    Substance and Sexual Activity    Alcohol use: Yes     Comment: rare    Drug use: No    Sexual activity: Never     Comment:  for 54 years   Lifestyle    Physical activity:     Days per week: Not on file     Minutes per session: Not on file    Stress: Not on file   Relationships    Social connections:     Talks on phone: Not on file     Gets together: Not on file     Attends Voodoo service: Not on file     Active member of club or organization: Not on file     Attends meetings of clubs or organizations: Not on file     Relationship status: Not on file    Intimate partner violence:     Fear of current or ex partner: Not on file     Emotionally abused: Not on file     Physically abused: Not on file     Forced sexual activity: Not on file   Other Topics Concern    Not on file   Social History Narrative        Retired:  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 HENT: Negative. Eyes: Negative. Respiratory: Negative. Cardiovascular: Negative. Gastrointestinal: Negative. Genitourinary: Negative. Musculoskeletal: Positive for arthralgias (R ankle pain). Skin: Negative. Neurological: Negative. Hematological: Negative. Psychiatric/Behavioral: Negative. All other systems reviewed and are negative. Vitals:    11/07/19 1602   BP: 184/81   Pulse: 95   Resp: 16   Temp: 99 °F (37.2 °C)   SpO2: 96%   Weight: 61.2 kg (135 lb)            Physical Exam   Constitutional: She is oriented to person, place, and time. She appears well-developed and well-nourished. No distress. HENT:   Head: Normocephalic and atraumatic. Neck: Normal range of motion. Cardiovascular: Intact distal pulses and normal pulses. Musculoskeletal: Normal range of motion. She exhibits no edema or tenderness. Tenderness to palpation over right lateral malleolus and lateral aspect of proximal foot with associated swelling and mild ecchymoses. Full range of motion right ankle and bowel joints right foot without difficulty out. Pain is worsened with movement of the ankle. Ambulatory with slight limp secondary to pain. Neurovascular intact distally, DP pulse 2+. Brisk cap refill to all digits. No proximal fibular tenderness. Neurological: She is alert and oriented to person, place, and time. She has normal strength. No sensory deficit. Skin: Skin is warm and dry. No rash noted. She is not diaphoretic. No erythema. No pallor. Psychiatric: She has a normal mood and affect.  Her behavior is normal. Nursing note and vitals reviewed. MDM  Number of Diagnoses or Management Options  Sprain of right ankle, unspecified ligament, initial encounter:   Diagnosis management comments: Carly Galloway is a [de-identified] y.o. female with hx significant for HTN who presents ambuatlry to ER with c/o right ankle pain since yesterday. Denies any trauma or injury. Patient with swelling and mild ecchymosis and tenderness to lateral aspect right ankle. X-ray unremarkable for any acute bony abnormality. Given patient's age, she is not a candidate for crutches. We will give stirrup ankle Aircast for comfort and given orthopedic follow-up if symptoms persist.  Recommended Tylenol ibuprofen for discomfort. Strict return precautions discussed. Glenna Champion PA-C         Amount and/or Complexity of Data Reviewed  Tests in the radiology section of CPT®: ordered and reviewed  Discuss the patient with other providers: yes (Dr. Monet Aguirre)           Procedures                           4:58 PM   Glenna Champion PA-C discussed patient with Celsa Kenny MD who is in agreement with care plan as outlined. No further recommendations. Glenna Champion PA-C       4:58 PM  Pt has been reevaluated. There are no new complaints, changes, or physical findings at this time. Medications have been reviewed w/ pt and/or family. Pt and/or family's questions have been answered. Pt and/or family expressed good understanding of the dx/tx/rx and is in agreement with plan of care. Pt instructed and agreed to f/u w/ PCP, ortho and to return to ED upon further deterioration. Pt is ready for discharge. LABORATORY TESTS:  No results found for this or any previous visit (from the past 12 hour(s)). IMAGING RESULTS:  XR ANKLE RT MIN 3 V   Final Result   IMPRESSION: Soft tissue swelling. No acute osseous abnormality. Xr Ankle Rt Min 3 V    Result Date: 11/7/2019  EXAM:  XR ANKLE RT MIN 3 V. INDICATION:  Right ankle pain and swelling. COMPARISON: None. FINDINGS: Three views of the right ankle demonstrate no fracture or disruption of the ankle mortise. No other acute osseous or articular abnormalities are identified. There is soft tissue swelling. IMPRESSION: Soft tissue swelling. No acute osseous abnormality. MEDICATIONS GIVEN:  Medications - No data to display    IMPRESSION:  1. Sprain of right ankle, unspecified ligament, initial encounter        PLAN:  1. Current Discharge Medication List      CONTINUE these medications which have NOT CHANGED    Details   albuterol (PROVENTIL HFA, VENTOLIN HFA, PROAIR HFA) 90 mcg/actuation inhaler Take 2 Puffs by inhalation. benzonatate (TESSALON PERLES) 100 mg capsule Take 100 mg by mouth three (3) times daily as needed for Cough. atorvastatin (LIPITOR) 20 mg tablet TAKE 1 TABLET BY MOUTH  DAILY  Qty: 90 Tab, Refills: 0      famotidine (PEPCID) 20 mg tablet Take 20 mg by mouth two (2) times daily as needed. diclofenac (VOLTAREN) 1 % gel Apply  to affected area four (4) times daily as needed. busPIRone (BUSPAR) 5 mg tablet Take 1 tab po in the am and midday and 1-2 tabs po in the evening  Indications: Repeated Episodes of Anxiety  Qty: 360 Tab, Refills: 0    Associated Diagnoses: Situational stress      lisinopril (PRINIVIL, ZESTRIL) 20 mg tablet Take 1 Tab by mouth daily. TAKE 1 TABLET BY MOUTH  DAILY  Indications: taking 1.5 tabs daily  Qty: 90 Tab, Refills: 1    Associated Diagnoses: Essential hypertension      vit C/E/Zn/coppr/lutein/zeaxan (PRESERVISION AREDS-2 PO) Take  by mouth. cyclobenzaprine (FLEXERIL) 5 mg tablet Take 1 Tab by mouth nightly. Do not drive while taking medication. Qty: 20 Tab, Refills: 0    Associated Diagnoses: Neck pain      nebivolol (BYSTOLIC) 5 mg tablet Take 1 tablet by mouth daily. Qty: 30 tablet, Refills: 0      MULTIVITAMIN W-MINERALS/LUTEIN (CENTRUM SILVER PO) Take  by mouth.       Associated Diagnoses: Osteopenia Cholecalciferol, Vitamin D3, (VITAMIN D3) 1,000 unit cap Take 2,000 Units by mouth. Associated Diagnoses: Osteopenia      hydroCHLOROthiazide (HYDRODIURIL) 12.5 mg tablet TAKE 1 TABLET BY MOUTH  DAILY  Qty: 90 Tab, Refills: 0           2.    Follow-up Information     Follow up With Specialties Details Why Contact Info    Micky Hamilton MD Orthopedic Surgery Schedule an appointment as soon as possible for a visit in 1 week If symptoms worsen or are not improving [address] Bobby Pardo MD Internal Medicine Schedule an appointment as soon as possible for a visit in 3 days As needed [address] [phone]      [address] [facility] DEPT Emergency Medicine  If symptoms worsen [address]  [phone]            Return to ED if worse

## 2019-12-08 RX ORDER — ATORVASTATIN CALCIUM 20 MG/1
TABLET, FILM COATED ORAL
Qty: 90 TAB | Refills: 0 | Status: SHIPPED | OUTPATIENT
Start: 2019-12-08 | End: 2020-03-02

## 2019-12-23 RX ORDER — HYDROCHLOROTHIAZIDE 12.5 MG/1
TABLET ORAL
Qty: 90 TAB | Refills: 0 | Status: SHIPPED | OUTPATIENT
Start: 2019-12-23 | End: 2020-03-16

## 2019-12-26 DIAGNOSIS — I10 ESSENTIAL HYPERTENSION: ICD-10-CM

## 2019-12-27 RX ORDER — LISINOPRIL 20 MG/1
30 TABLET ORAL DAILY
Qty: 135 TAB | Refills: 2 | Status: SHIPPED | OUTPATIENT
Start: 2019-12-27 | End: 2019-12-30

## 2019-12-30 DIAGNOSIS — I10 ESSENTIAL HYPERTENSION: ICD-10-CM

## 2019-12-30 RX ORDER — LISINOPRIL 30 MG/1
30 TABLET ORAL DAILY
Qty: 90 TAB | Refills: 2 | Status: SHIPPED | OUTPATIENT
Start: 2019-12-30 | End: 2019-12-30

## 2019-12-30 RX ORDER — LISINOPRIL 30 MG/1
30 TABLET ORAL DAILY
Qty: 90 TAB | Refills: 2 | Status: SHIPPED | OUTPATIENT
Start: 2019-12-30 | End: 2020-03-24 | Stop reason: SDUPTHER

## 2019-12-31 ENCOUNTER — TELEPHONE (OUTPATIENT)
Dept: INTERNAL MEDICINE CLINIC | Age: 79
End: 2019-12-31

## 2019-12-31 NOTE — TELEPHONE ENCOUNTER
----- Message from Emmy  sent at 12/31/2019  8:04 AM EST -----  Regarding: Dr. Johanna Alexis telephone  General Message/ Vendor Calls    Caller's first and last name: Chastity Beltran     Reason for call: Prescription Inquiry     Callback required yes/no and why: Yes    Best contact number(s): [phone]    Details to clarify the request: Pharmacist needs clarification on the dosage supposed to be taken daily.

## 2020-03-02 RX ORDER — ATORVASTATIN CALCIUM 20 MG/1
TABLET, FILM COATED ORAL
Qty: 90 TAB | Refills: 0 | Status: SHIPPED | OUTPATIENT
Start: 2020-03-02 | End: 2020-05-25

## 2020-03-16 RX ORDER — HYDROCHLOROTHIAZIDE 12.5 MG/1
TABLET ORAL
Qty: 90 TAB | Refills: 0 | Status: SHIPPED | OUTPATIENT
Start: 2020-03-16 | End: 2020-06-08

## 2020-03-24 DIAGNOSIS — I10 ESSENTIAL HYPERTENSION: ICD-10-CM

## 2020-03-24 DIAGNOSIS — F43.9 SITUATIONAL STRESS: ICD-10-CM

## 2020-03-24 RX ORDER — LISINOPRIL 30 MG/1
30 TABLET ORAL DAILY
Qty: 90 TAB | Refills: 0 | Status: SHIPPED | OUTPATIENT
Start: 2020-03-24 | End: 2020-05-11

## 2020-03-24 RX ORDER — BUSPIRONE HYDROCHLORIDE 5 MG/1
TABLET ORAL
Qty: 360 TAB | Refills: 0 | Status: SHIPPED | OUTPATIENT
Start: 2020-03-24 | End: 2020-07-05

## 2020-04-17 DIAGNOSIS — K92.89 GAS BLOAT SYNDROME: Primary | ICD-10-CM

## 2020-04-17 RX ORDER — DICYCLOMINE HYDROCHLORIDE 10 MG/1
10 CAPSULE ORAL 4 TIMES DAILY
Qty: 30 CAP | Refills: 0 | Status: SHIPPED | OUTPATIENT
Start: 2020-04-17 | End: 2021-06-18 | Stop reason: SDUPTHER

## 2020-05-11 ENCOUNTER — VIRTUAL VISIT (OUTPATIENT)
Dept: INTERNAL MEDICINE CLINIC | Age: 80
End: 2020-05-11

## 2020-05-11 ENCOUNTER — NURSE TRIAGE (OUTPATIENT)
Dept: OTHER | Facility: CLINIC | Age: 80
End: 2020-05-11

## 2020-05-11 ENCOUNTER — TELEPHONE (OUTPATIENT)
Dept: INTERNAL MEDICINE CLINIC | Age: 80
End: 2020-05-11

## 2020-05-11 VITALS — HEIGHT: 60 IN | WEIGHT: 135 LBS | BODY MASS INDEX: 26.5 KG/M2

## 2020-05-11 DIAGNOSIS — I10 ESSENTIAL HYPERTENSION: ICD-10-CM

## 2020-05-11 DIAGNOSIS — R05.9 COUGH: ICD-10-CM

## 2020-05-11 DIAGNOSIS — R05.9 COUGH: Primary | ICD-10-CM

## 2020-05-11 RX ORDER — MONTELUKAST SODIUM 10 MG/1
TABLET ORAL
Qty: 90 TAB | Refills: 1 | Status: SHIPPED | OUTPATIENT
Start: 2020-05-11 | End: 2021-06-10

## 2020-05-11 RX ORDER — ALBUTEROL SULFATE 90 UG/1
1 AEROSOL, METERED RESPIRATORY (INHALATION)
Qty: 1 INHALER | Refills: 1 | Status: SHIPPED | OUTPATIENT
Start: 2020-05-11 | End: 2022-06-28 | Stop reason: SDUPTHER

## 2020-05-11 RX ORDER — MONTELUKAST SODIUM 10 MG/1
10 TABLET ORAL DAILY
Qty: 30 TAB | Refills: 0 | Status: SHIPPED | OUTPATIENT
Start: 2020-05-11 | End: 2020-05-11

## 2020-05-11 RX ORDER — LISINOPRIL 20 MG/1
30 TABLET ORAL DAILY
Qty: 9 TAB | Refills: 5
Start: 2020-05-11 | End: 2020-05-25 | Stop reason: ALTCHOICE

## 2020-05-11 RX ORDER — GUAIFENESIN 600 MG/1
600 TABLET, EXTENDED RELEASE ORAL 2 TIMES DAILY
Qty: 30 TAB | Refills: 0 | Status: SHIPPED | OUTPATIENT
Start: 2020-05-11

## 2020-05-11 RX ORDER — GREEN TEA/HOODIA GORDONII 315-12.5MG
CAPSULE ORAL
COMMUNITY
End: 2021-06-10

## 2020-05-11 NOTE — TELEPHONE ENCOUNTER
Pt calling today with a cough, and a knot in the center of her chest that feels like \"the beginnings of bronchitis\". She was calling to speak with her MD.    Reason for Disposition   ALSO, mild central chest pain occurs only when coughing   Cough    Protocols used: COUGH - ACUTE NON-PRODUCTIVE-ADULT-AH    Pt adamant about speaking with her PCP now, she has had bronchitis multiple times in the past and does not want it to get worse.  Call transferred back to [facility] to get her scheduled with her MD.

## 2020-05-11 NOTE — TELEPHONE ENCOUNTER
----- Message from Carla Reinoso sent at 5/11/2020 12:46 PM EDT -----  Regarding: Bonita/telephone  Pt is requesting for you to call her in regard to her having bronchitis. Pts number is [phone].

## 2020-05-11 NOTE — PROGRESS NOTES
Consent: Trini Bhatt, who was seen by synchronous (real-time) audio technology, and/or her healthcare decision maker, is aware that this patient-initiated, Telehealth encounter on 5/11/2020 is a billable service, with coverage as determined by her insurance carrier. She is aware that she may receive a bill and has provided verbal consent to proceed: YES          Please note this is a telephone call visit. It was 12 minutes. All questions were asked and were answered to the best my knowledge. Please see assessment and plan below  712  Assessment & Plan:   Diagnoses and all orders for this visit:    1. Cough  Patient denies symptoms consistent with COVID and no fever or shortness of breath. Patient symptoms acutely started this morning. Will treat with albuterol and Singulair for now  -     montelukast (SINGULAIR) 10 mg tablet; Take 1 Tab by mouth daily. -     albuterol (PROVENTIL HFA, VENTOLIN HFA, PROAIR HFA) 90 mcg/actuation inhaler; Take 1 Puff by inhalation every four (4) hours as needed for Wheezing.  -     guaiFENesin ER (MUCINEX) 600 mg ER tablet; Take 1 Tab by mouth two (2) times a day. Take with water  . If symptoms are not improving we may need to transition    2. Essential hypertension  Continue meds  She is on a single 30 mg tablet  -     lisinopriL (PRINIVIL, ZESTRIL) 20 mg tablet; Take 1.5 Tabs by mouth daily. Indications: taking 1.5 tabs daily    If symptoms persist we may need to take her off lisinopril and may need to be evaluated in clinic          Subjective:   Trini Bhatt is a 79 y.o. female who was seen for Cough  Bronchitis  Knot in upper part of chest  Has been outdoors and exposed to pollen and wind  She is concerned she would like   Albuterol in the past has helped  No sinus pain or pressure  Cough started this am  Alb  mucinex  Cough recently started this morning    Subjective:   Trini Bhatt is a 79 y.o. female with hypertension.     Hypertension ROS: taking medications as instructed, no medication side effects noted, no TIA's, no chest pain on exertion, no dyspnea on exertion, no swelling of ankles. New concerns: She notes her blood pressure was 164/68 but she does not want to increase her lisinopril. .       Current Outpatient Medications   Medication Sig    dicyclomine (BENTYL) 10 mg capsule Take 1 Cap by mouth four (4) times daily.  busPIRone (BUSPAR) 5 mg tablet Take 1 tab po in the am and midday and 1-2 tabs po in the evening  Indications: repeated episodes of anxiety    lisinopriL (PRINIVIL, ZESTRIL) 30 mg tablet Take 1 Tab by mouth daily. Indications: taking 1.5 tabs daily    hydroCHLOROthiazide (HYDRODIURIL) 12.5 mg tablet TAKE 1 TABLET BY MOUTH  DAILY    atorvastatin (LIPITOR) 20 mg tablet TAKE 1 TABLET BY MOUTH  DAILY    albuterol (PROVENTIL HFA, VENTOLIN HFA, PROAIR HFA) 90 mcg/actuation inhaler Take 2 Puffs by inhalation.  benzonatate (TESSALON PERLES) 100 mg capsule Take 100 mg by mouth three (3) times daily as needed for Cough.  famotidine (PEPCID) 20 mg tablet Take 20 mg by mouth two (2) times daily as needed.  diclofenac (VOLTAREN) 1 % gel Apply  to affected area four (4) times daily as needed.  vit C/E/Zn/coppr/lutein/zeaxan (PRESERVISION AREDS-2 PO) Take  by mouth.  cyclobenzaprine (FLEXERIL) 5 mg tablet Take 1 Tab by mouth nightly. Do not drive while taking medication.  nebivolol (BYSTOLIC) 5 mg tablet Take 1 tablet by mouth daily.  MULTIVITAMIN W-MINERALS/LUTEIN (CENTRUM SILVER PO) Take  by mouth.  Cholecalciferol, Vitamin D3, (VITAMIN D3) 1,000 unit cap Take 2,000 Units by mouth. No current facility-administered medications for this visit.         Allergies   Allergen Reactions    Codeine Palpitations    Levofloxacin Other (comments)    Prednisone Palpitations    Vioxx [Rofecoxib] Palpitations     Subjective    Past Medical History:   Diagnosis Date    Essential hypertension, benign     Fibromyalgia     Myalgia and myositis, unspecified     Other and unspecified hyperlipidemia 10/12/2010    Palpitations 10/12/2010    Dr. Jackson  Sleep apnea     Dr. Roderick Carter, not taking cpap    SOB (shortness of breath)        ROS  All other systems reviewed and negative, unless mentioned in HPI    Objective:   Vital Signs: (As obtained by patient/caregiver at home)  There were no vitals taken for this visit. [INSTRUCTIONS:  \"[x]\" Indicates a positive item  \"[]\" Indicates a negative item  -- DELETE ALL ITEMS NOT EXAMINED]    Constitutional: [x] Appears well-developed and well-nourished [x] No apparent distress      [] Abnormal -     Mental status: [x] Alert and awake  [x] Oriented to person/place/time [x] Able to follow commands    [] Abnormal -     Eyes:   EOM    [x]  Normal    [] Abnormal -   Sclera  [x]  Normal    [] Abnormal -          Discharge [x]  None visible   [] Abnormal -     HENT: [x] Normocephalic, atraumatic  [] Abnormal -   [x] Mouth/Throat: Mucous membranes are moist    External Ears [x] Normal  [] Abnormal -    Neck: [x] No visualized mass [] Abnormal -     Pulmonary/Chest: [x] Respiratory effort normal   [x] No visualized signs of difficulty breathing or respiratory distress        [] Abnormal -      Musculoskeletal:   [x] Normal gait with no signs of ataxia         [x] Normal range of motion of neck        [] Abnormal -     Neurological:        [x] No Facial Asymmetry (Cranial nerve 7 motor function) (limited exam due to video visit)          [x] No gaze palsy        [] Abnormal -          Skin:        [x] No significant exanthematous lesions or discoloration noted on facial skin         [] Abnormal -            Psychiatric:       [x] Normal Affect [] Abnormal -        [x] No Hallucinations    Other pertinent observable physical exam findings:-      We discussed the expected course, resolution and complications of the diagnosis(es) in detail.   Medication risks, benefits, costs, interactions, and alternatives were discussed as indicated. I advised her to contact the office if her condition worsens, changes or fails to improve as anticipated. She expressed understanding with the diagnosis(es) and plan. Luz Elena Helm is a 79 y.o. female being evaluated by a video visit encounter for concerns as above. A caregiver was present when appropriate. Due to this being a TeleHealth encounter (During [facility] emergency), evaluation of the following organ systems was limited: Vitals/Constitutional/EENT/Resp/CV/GI//MS/Neuro/Skin/Heme-Lymph-Imm. Pursuant to the emergency declaration under the [facility], 1135 waiver authority and the [employer] Act, this Virtual  Visit was conducted, with patient's (and/or legal guardian's) consent, to reduce the patient's risk of exposure to COVID-19 and provide necessary medical care. Services were provided through a video synchronous discussion virtually to substitute for in-person clinic visit. Patient and provider were located at their individual homes.     Naga Rosales MD

## 2020-05-25 DIAGNOSIS — I10 ESSENTIAL HYPERTENSION: ICD-10-CM

## 2020-05-25 RX ORDER — LISINOPRIL 30 MG/1
TABLET ORAL
Qty: 90 TAB | Refills: 0 | Status: SHIPPED | OUTPATIENT
Start: 2020-05-25 | End: 2020-11-24 | Stop reason: SDUPTHER

## 2020-05-30 NOTE — TELEPHONE ENCOUNTER
From: Radha Mccray  To: Eli Isaac MD  Sent: 10/3/2017 11:10 AM EDT  Subject: Medication Renewal Request    Original authorizing provider: MD Elaina Collins would like a refill of the following medications:  amitriptyline (ELAVIL) 10 mg tablet Eli Isaac MD]    Preferred pharmacy: [address] [place] [address]    Comment: PROGRESS NOTE      Patient: Mone Houston Today's Date: 2020   YOB: 1968 Date of Admission: 2020   MR Number: [id number] Attending Physician: Mendel Hester MD     Full Resuscitation    Hospital Day: 3    Chief Complaint: Leg rash    Subjective:  Patient seen and examined and is in no acute distress.  Her leg rash does not look better today.  It is very tender, it spares skin covered areas.    Past Medical/Surgical/Social Histories:  Reviewed as recorded in the admit History & Physical by Devika Dumont MD on 2020.    Medications and Allergies:  Reviewed as recorded in Epic.    Review of Systems:   All 10 point ROS (Review of Systems) are negative except the above    OBJECTIVE:   Vitals 24-Hour Range Most Recent Value   Temperature Temp  Min: 97.5 °F (36.4 °C)  Max: 97.7 °F (36.5 °C) 97.7 °F (36.5 °C)   Pulse Pulse  Min: 84  Max: 103 (!) 103   Respiratory Resp  Min: 16  Max: 20 20   Blood Pressure BP  Min: 88/60  Max: 120/55 120/55   Pulse Oximetry SpO2  Min: 90 %  Max: 94 %    O2 O2 Flow Rate (L/min)  Av L/min  Min: 4 L/min   Min taken time: 20 0000  Max: 4 L/min   Max taken time: 20 0000      Vitals Most Recent Value First Value   Weight (!) 170.1 kg Weight: (!) 168.5 kg   Height 5' (152.4 cm) Height: 5' (152.4 cm)       Intake/Output Summary (Last 24 hours) at 2020 0917  Last data filed at 2020 0600  Gross per 24 hour   Intake 2215 ml   Output --   Net 2215 ml       Physical Exam:  Constitutional: She is oriented to person, place, and time. No distress.   HENT:   Head: Normocephalic.   Eyes: Pupils are equal, round, and reactive to light.   Cardiovascular: Exam reveals no gallop and no friction rub.   No murmur heard.  Pulmonary/Chest: No respiratory distress. She has no wheezes. She has no rales. She exhibits no tenderness.   Musculoskeletal:         General: Tenderness and edema present.      Comments: No drainage or open wounds noted     Neurological: She is alert and oriented to person, place, and time. She displays normal reflexes. No cranial nerve deficit. She exhibits normal muscle tone.   Skin: Skin is warm.     Ancillary Studies and Laboratory Results:  CMP  Recent Labs   Lab 05/29/20 0500 05/28/20 2025 05/28/20 2015   SODIUM 138  --  135   POTASSIUM 3.8  --  4.3   CHLORIDE 99  --  97*   CO2 34*  --  34*   ANIONGAP 9*  --  8*   BUN 10  --  10   CREATININE 0.42* 0.50* 0.50*   GLUCOSE 209*  --  351*   CALCIUM 8.8  --  9.0   ALBUMIN 3.4*  --  3.6   AST 26  --  24   GPT 39  --  37   ALKPT 70  --  66   BILIRUBIN 0.3  --  0.2     CBC  Recent Labs   Lab 05/29/20 0500 05/28/20 2015   WBC 6.5 6.9   ANEUT 2.8 3.4   RBC 3.87* 3.99*   HGB 11.8* 12.3   HCT 36.7 37.3    260     CARDIAC  No results found  ENDO  Recent Labs   Lab 05/29/20 0500   TSH 2.142     LIPIDS  No results found  URINE  Recent Labs   Lab 05/29/20  0901   USPG 1.020   UPH 5.5   UKET Negative   UPROT Negative   UGLU Negative   UBILI Negative   UROB 0.2   UWBC Negative   UNITR Negative   URBC Negative     IMAGING  Reviewed as recorded in EPIC    CULTURES   Microbiology Results     None          PENDING LABS and PROCEDURES  Unresulted Labs (From admission, onward)     Start     Ordered    05/30/20 0806  Basic Metabolic Panel  ONE TIME,   Routine      05/30/20 0805    05/29/20 0700  Metered Blood Glucose  4 TIMES DAILY BEFORE MEALS & N,   Routine      05/29/20 0210    05/29/20 0600  Metered Blood Glucose  EVERY 6 HOURS,   Routine      05/29/20 0210    05/28/20 2035  Extra Tubes  ONE TIME,   Routine      05/28/20 2034              Unresulted Procedure (From admission, onward)     Start     Ordered    05/29/20 0328  Echo M-mode/2D/Doppler (Routine)  1 TIME IMAGING,   Routine      05/29/20 0336                Assessment and Plan:  Problem list as noted above.   1. Sepsis secondary to acute on chronic bilateral lower extremity cellulitis: appreciate recommendations by ID, continue  per ID.  Photosensitive rash is also in the differential due to recent doxy use. The rash spares skin covered areas.  2. DM Type II: continue home regimen, sugars controlled  3. Chronic Dysphagia secondary to CVA: continue tube feeds  4. Severe Neuropathy and chronic back pain: continue significant pain regimen, pain controlled.  5. Anxiety and depression: continue current regimen  6. SATYA: continue CPAP QHS  7. Morbid Obesity class III: supportive care, discussed with dietician  8. COPD: chronically on 4L NC      Deep Vein Thrombosis Prophylaxis:  Heparin SQ    Estimated Discharge Date:  TBD  Discharge Barrier:  IV antibiotics  Estimated Discharge Destination:  Group home.    Mendel Hester MD  Hospitalist 7:00 am to 7:00 pm, Pager: [phone]  5/30/2020  9:17 AM

## 2020-07-05 DIAGNOSIS — F43.9 SITUATIONAL STRESS: ICD-10-CM

## 2020-07-05 RX ORDER — BUSPIRONE HYDROCHLORIDE 5 MG/1
TABLET ORAL
Qty: 360 TAB | Refills: 0 | Status: SHIPPED | OUTPATIENT
Start: 2020-07-05 | End: 2020-07-07 | Stop reason: ALTCHOICE

## 2020-07-07 RX ORDER — BUSPIRONE HYDROCHLORIDE 5 MG/1
TABLET ORAL
Qty: 360 TAB | Refills: 0 | Status: SHIPPED | OUTPATIENT
Start: 2020-07-07 | End: 2020-12-20

## 2020-11-24 ENCOUNTER — TELEPHONE (OUTPATIENT)
Dept: INTERNAL MEDICINE CLINIC | Age: 80
End: 2020-11-24

## 2020-11-24 DIAGNOSIS — I10 ESSENTIAL HYPERTENSION: ICD-10-CM

## 2020-11-24 NOTE — TELEPHONE ENCOUNTER
----- Message from Mario Hylton sent at 11/24/2020 11:03 AM EST -----  Regarding: Dr Lauren Coello (if not patient): Pt  Relationship of caller (if not patient): N/A  Best contact number(s): [phone]  Name of medication and dosage if known: \"Lisinopril 30 mg\"  Is patient out of this medication (yes/no): 1 pill left  Pharmacy name: [facility] listed in chart? (yes/no): No  Pharmacy phone number: [phone]  Date of last visit: 11/17/2020  Details to clarify the request:  Pt would like a 30 day supply called in to the [place] pharmacy due to running out of her medication. Also, please see below.      [facility] [phone] - 90 day supply w/ refills to be called into this mail order pharm as well

## 2020-11-25 RX ORDER — LISINOPRIL 30 MG/1
TABLET ORAL
Qty: 30 TAB | Refills: 0 | Status: SHIPPED | OUTPATIENT
Start: 2020-11-25 | End: 2020-11-26 | Stop reason: SDUPTHER

## 2020-11-26 DIAGNOSIS — I10 ESSENTIAL HYPERTENSION: ICD-10-CM

## 2020-11-26 RX ORDER — LISINOPRIL 30 MG/1
TABLET ORAL
Qty: 90 TAB | Refills: 0 | Status: SHIPPED | OUTPATIENT
Start: 2020-11-26 | End: 2021-03-19

## 2020-12-04 ENCOUNTER — OFFICE VISIT (OUTPATIENT)
Dept: INTERNAL MEDICINE CLINIC | Age: 80
End: 2020-12-04
Payer: MEDICARE

## 2020-12-04 VITALS
HEIGHT: 60 IN | TEMPERATURE: 97.5 F | DIASTOLIC BLOOD PRESSURE: 83 MMHG | HEART RATE: 54 BPM | SYSTOLIC BLOOD PRESSURE: 157 MMHG | WEIGHT: 141 LBS | RESPIRATION RATE: 12 BRPM | BODY MASS INDEX: 27.68 KG/M2 | OXYGEN SATURATION: 96 %

## 2020-12-04 DIAGNOSIS — Z13.31 SCREENING FOR DEPRESSION: ICD-10-CM

## 2020-12-04 DIAGNOSIS — Z00.00 MEDICARE ANNUAL WELLNESS VISIT, SUBSEQUENT: Primary | ICD-10-CM

## 2020-12-04 DIAGNOSIS — Z78.0 POSTMENOPAUSAL STATE: ICD-10-CM

## 2020-12-04 DIAGNOSIS — K59.00 CONSTIPATION, UNSPECIFIED CONSTIPATION TYPE: ICD-10-CM

## 2020-12-04 DIAGNOSIS — E53.8 B12 DEFICIENCY DUE TO DIET: ICD-10-CM

## 2020-12-04 DIAGNOSIS — F43.9 SITUATIONAL STRESS: ICD-10-CM

## 2020-12-04 DIAGNOSIS — I10 ESSENTIAL HYPERTENSION: ICD-10-CM

## 2020-12-04 DIAGNOSIS — Z12.31 ENCOUNTER FOR SCREENING MAMMOGRAM FOR MALIGNANT NEOPLASM OF BREAST: ICD-10-CM

## 2020-12-04 DIAGNOSIS — Z13.39 SCREENING FOR ALCOHOLISM: ICD-10-CM

## 2020-12-04 DIAGNOSIS — R73.03 PREDIABETES: ICD-10-CM

## 2020-12-04 DIAGNOSIS — E55.9 VITAMIN D DEFICIENCY: ICD-10-CM

## 2020-12-04 PROCEDURE — 1101F PT FALLS ASSESS-DOCD LE1/YR: CPT | Performed by: INTERNAL MEDICINE

## 2020-12-04 PROCEDURE — G8510 SCR DEP NEG, NO PLAN REQD: HCPCS | Performed by: INTERNAL MEDICINE

## 2020-12-04 PROCEDURE — G8753 SYS BP > OR = 140: HCPCS | Performed by: INTERNAL MEDICINE

## 2020-12-04 PROCEDURE — G8754 DIAS BP LESS 90: HCPCS | Performed by: INTERNAL MEDICINE

## 2020-12-04 PROCEDURE — G0439 PPPS, SUBSEQ VISIT: HCPCS | Performed by: INTERNAL MEDICINE

## 2020-12-04 PROCEDURE — G8419 CALC BMI OUT NRM PARAM NOF/U: HCPCS | Performed by: INTERNAL MEDICINE

## 2020-12-04 PROCEDURE — G8399 PT W/DXA RESULTS DOCUMENT: HCPCS | Performed by: INTERNAL MEDICINE

## 2020-12-04 PROCEDURE — G8536 NO DOC ELDER MAL SCRN: HCPCS | Performed by: INTERNAL MEDICINE

## 2020-12-04 PROCEDURE — 1090F PRES/ABSN URINE INCON ASSESS: CPT | Performed by: INTERNAL MEDICINE

## 2020-12-04 PROCEDURE — G8427 DOCREV CUR MEDS BY ELIG CLIN: HCPCS | Performed by: INTERNAL MEDICINE

## 2020-12-04 PROCEDURE — 99214 OFFICE O/P EST MOD 30 MIN: CPT | Performed by: INTERNAL MEDICINE

## 2020-12-04 RX ORDER — LORATADINE 10 MG/1
10 TABLET ORAL
COMMUNITY
End: 2021-06-10 | Stop reason: ALTCHOICE

## 2020-12-04 RX ORDER — ZOSTER VACCINE RECOMBINANT, ADJUVANTED 50 MCG/0.5
KIT INTRAMUSCULAR
Qty: 0.5 ML | Refills: 1 | Status: SHIPPED | OUTPATIENT
Start: 2020-12-04

## 2020-12-04 RX ORDER — AMOXICILLIN 500 MG/1
TABLET, FILM COATED ORAL
COMMUNITY
Start: 2020-09-08 | End: 2020-12-04 | Stop reason: ALTCHOICE

## 2020-12-04 NOTE — PATIENT INSTRUCTIONS
Medicare Wellness Visit, Female     The best way to live healthy is to have a lifestyle where you eat a well-balanced diet, exercise regularly, limit alcohol use, and quit all forms of tobacco/nicotine, if applicable. Regular preventive services are another way to keep healthy. Preventive services (vaccines, screening tests, monitoring & exams) can help personalize your care plan, which helps you manage your own care. Screening tests can find health problems at the earliest stages, when they are easiest to treat. [facility] follows the current, evidence-based guidelines published by the [facility] ([facility]) when recommending preventive services for our patients. Because we follow these guidelines, sometimes recommendations change over time as research supports it. (For example, mammograms used to be recommended annually. Even though Medicare will still pay for an annual mammogram, the newer guidelines recommend a mammogram every two years for women of average risk). Of course, you and your doctor may decide to screen more often for some diseases, based on your risk and your co-morbidities (chronic disease you are already diagnosed with). Preventive services for you include:  - Medicare offers their members a free annual wellness visit, which is time for you and your primary care provider to discuss and plan for your preventive service needs. Take advantage of this benefit every year!  -All adults over the age of 72 should receive the recommended pneumonia vaccines. Current USPSTF guidelines recommend a series of two vaccines for the best pneumonia protection.   -All adults should have a flu vaccine yearly and a tetanus vaccine every 10 years.   -All adults age 48 and older should receive the shingles vaccines (series of two vaccines).       -All adults age 38-68 who are overweight should have a diabetes screening test once every three years.   -All adults born between [birth year] and 1966 should be screened once for Hepatitis C.  -Other screening tests and preventive services for persons with diabetes include: an eye exam to screen for diabetic retinopathy, a kidney function test, a foot exam, and stricter control over your cholesterol.   -Cardiovascular screening for adults with routine risk involves an electrocardiogram (ECG) at intervals determined by your doctor.   -Colorectal cancer screenings should be done for adults age 54-65 with no increased risk factors for colorectal cancer. There are a number of acceptable methods of screening for this type of cancer. Each test has its own benefits and drawbacks. Discuss with your doctor what is most appropriate for you during your annual wellness visit. The different tests include: colonoscopy (considered the best screening method), a fecal occult blood test, a fecal DNA test, and sigmoidoscopy.    -A bone mass density test is recommended when a woman turns 65 to screen for osteoporosis. This test is only recommended one time, as a screening. Some providers will use this same test as a disease monitoring tool if you already have osteoporosis. -Breast cancer screenings are recommended every other year for women of normal risk, age 54-69.  -Cervical cancer screenings for women over age 72 are only recommended with certain risk factors.      Here is a list of your current Health Maintenance items (your personalized list of preventive services) with a due date:  Health Maintenance Due   Topic Date Due    Annual Well Visit  08/03/2019    Cholesterol Test   05/30/2020    Yearly Flu Vaccine (1) 09/01/2020    Glaucoma Screening   10/01/2020

## 2020-12-04 NOTE — PROGRESS NOTES
Patient presents for her Medicare wellness visit. Please see below. She is also here to follow-up on her blood pressure and cholesterol. She is also having some family related situational stress. This is the Subsequent Medicare Annual Wellness Exam, performed 12 months or more after the Initial AWV or the last Subsequent AWV    I have reviewed the patient's medical history in detail and updated the computerized patient record. Depression Risk Factor Screening:     3 most recent PHQ Screens 12/4/2020   Little interest or pleasure in doing things Not at all   Feeling down, depressed, irritable, or hopeless Not at all   Total Score PHQ 2 0   Trouble falling or staying asleep, or sleeping too much -   Feeling tired or having little energy -   Poor appetite, weight loss, or overeating -   Feeling bad about yourself - or that you are a failure or have let yourself or your family down -   Trouble concentrating on things such as school, work, reading, or watching TV -   Moving or speaking so slowly that other people could have noticed; or the opposite being so fidgety that others notice -   Thoughts of being better off dead, or hurting yourself in some way -   PHQ 9 Score -       Alcohol Risk Screen   Do you average more than 1 drink per night or more than 7 drinks a week:  No    On any one occasion in the past three months have you have had more than 3 drinks containing alcohol:  No        Functional Ability and Level of Safety:   Hearing: Hearing is good. Activities of Daily Living: The home contains: no safety equipment. Patient does total self care     Ambulation: with no difficulty     Fall Risk:  Fall Risk Assessment, last 12 mths 5/11/2020   Able to walk? Yes   Fall in past 12 months?  No   Fall with injury? -   Number of falls in past 12 months -   Fall Risk Score -     Abuse Screen:  Patient is not abused       Cognitive Screening   Has your family/caregiver stated any concerns about your memory: no     Cognitive Screening: Normal - Verbal Fluency Test    Assessment/Plan   Education and counseling provided:  Are appropriate based on today's review and evaluation  End-of-Life planning (with patient's consent)  Pneumococcal Vaccine  Influenza Vaccine  Screening Mammography  Colorectal cancer screening tests patient had a colonoscopy in 2018  Bone mass measurement (DEXA) patient does not really want medication but is interested in looking at bone density  Screening for glaucoma  Diabetes screening test patient is due for another hemoglobin A1c as she is prediabetic. Diagnoses and all orders for this visit:    1. Medicare annual wellness visit, subsequent  Medicare questions answered as above. Patient is due for immunizations and will follow up with ophthalmology Dr. Humberto Feliz and dermatology Dr. Cristian carr. -     varicella-zoster recombinant, PF, (Shingrix, PF,) 50 mcg/0.5 mL susr injection; 0.5mL by IntraMUSCular route once now and then repeat in 2-6 months  -     REFERRAL TO OPHTHALMOLOGY  -     REFERRAL TO DERMATOLOGY    2. Screening for alcoholism  Patient does not have any alcohol issues    3. Screening for depression  Patient does not have depression although the macular degeneration has decreased her mood  -     [facility]    4. Encounter for screening mammogram for malignant neoplasm of breast  Patient gets at [address] INCL CAD; Future    5. Postmenopausal state  Patient is straight and following up on her bone density  -     DEXA BONE DENSITY STUDY AXIAL;  Future        Health Maintenance Due     Health Maintenance Due   Topic Date Due    Medicare Yearly Exam  08/03/2019    Lipid Screen  05/30/2020    Flu Vaccine (1) 09/01/2020    GLAUCOMA SCREENING Q2Y  10/01/2020       Patient Care Team   Patient Care Team:  Sri Gil MD as PCP - General (Internal Medicine)  Sri Gil MD as PCP - [facility] Empaneled Provider  Alix Escobar MD (Cardio Vascular Surgery)    History     Patient Active Problem List   Diagnosis Code    Essential hypertension, benign I10    Other and unspecified hyperlipidemia E78.5    Palpitations R00.2    Obstructive sleep apnea (adult) (pediatric) G47.33    SOB (shortness of breath) R06.02    Myalgia and myositis, unspecified [id number]    Fibromyalgia M79.7    Sleep apnea G47.30    Advanced care planning/counseling discussion Z71.89     Past Medical History:   Diagnosis Date    Essential hypertension, benign     Fibromyalgia     Macular degeneration 2020    dr. Whitney Toribio Myalgia and myositis, unspecified     Other and unspecified hyperlipidemia 10/12/2010    Palpitations 10/12/2010    Dr. Patirck Cedeño    Sleep apnea     Dr. Yandel Cancino, not taking cpap    SOB (shortness of breath)       Past Surgical History:   Procedure Laterality Date    HX HYSTERECTOMY      HX ORTHOPAEDIC      thumb and left foot     Current Outpatient Medications   Medication Sig Dispense Refill    loratadine (Claritin) 10 mg tablet Take 10 mg by mouth.  varicella-zoster recombinant, PF, (Shingrix, PF,) 50 mcg/0.5 mL susr injection 0.5mL by IntraMUSCular route once now and then repeat in 2-6 months 0.5 mL 1    lisinopriL (PRINIVIL, ZESTRIL) 30 mg tablet TAKE 1 TABLET BY MOUTH  DAILY 90 Tab 0    busPIRone (BUSPAR) 5 mg tablet Take 1 tab po in the am and midday and 1-2 tabs po in the evening  Indications: repeated episodes of anxiety 360 Tab 0    hydroCHLOROthiazide (HYDRODIURIL) 12.5 mg tablet TAKE 1 TABLET BY MOUTH  DAILY 30 Tab 0    atorvastatin (LIPITOR) 20 mg tablet Take 1 Tab by mouth daily. If cough please follow up with pcp - may be se of med. 90 Tab 0    mv,Ca,min-iron gluc-FA-biotin (Hair,Skin and Nails) 1 mg iron-66.7 mcg-1,000 mcg tab Take  by mouth.  albuterol (PROVENTIL HFA, VENTOLIN HFA, PROAIR HFA) 90 mcg/actuation inhaler Take 1 Puff by inhalation every four (4) hours as needed for Wheezing.  1 Inhaler 1    guaiFENesin ER (MUCINEX) 600 mg ER tablet Take 1 Tab by mouth two (2) times a day. Take with water 30 Tab 0    dicyclomine (BENTYL) 10 mg capsule Take 1 Cap by mouth four (4) times daily. 30 Cap 0    benzonatate (TESSALON PERLES) 100 mg capsule Take 100 mg by mouth three (3) times daily as needed for Cough.  famotidine (PEPCID) 20 mg tablet Take 20 mg by mouth two (2) times daily as needed.  diclofenac (VOLTAREN) 1 % gel Apply  to affected area four (4) times daily as needed.  vit C/E/Zn/coppr/lutein/zeaxan (PRESERVISION AREDS-2 PO) Take  by mouth.  nebivolol (BYSTOLIC) 5 mg tablet Take 1 tablet by mouth daily. 30 tablet 0    MULTIVITAMIN W-MINERALS/LUTEIN (CENTRUM SILVER PO) Take  by mouth. Indications: not taking      Cholecalciferol, Vitamin D3, (VITAMIN D3) 1,000 unit cap Take 2,000 Units by mouth.  montelukast (SINGULAIR) 10 mg tablet TAKE 1 TABLET BY MOUTH DAILY (Patient taking differently: Indications: NOT TAKING.) 90 Tab 1    cyclobenzaprine (FLEXERIL) 5 mg tablet Take 1 Tab by mouth nightly. Do not drive while taking medication. (Patient taking differently: Take 5 mg by mouth nightly. Do not drive while taking medication.   Indications: not taking) 20 Tab 0     Allergies   Allergen Reactions    Codeine Palpitations    Levofloxacin Other (comments)    Prednisone Palpitations    Vioxx [Rofecoxib] Palpitations       Family History   Problem Relation Age of Onset    Diabetes Mother 61    Cancer Maternal Grandmother         colon ca    Diabetes Maternal Grandmother 61    Cancer Paternal Grandmother         intestinal ca    Heart Attack Paternal Grandmother     Stroke Paternal Grandmother     Heart Attack Paternal Grandfather     Stroke Paternal Grandfather      Social History     Tobacco Use    Smoking status: Never Smoker    Smokeless tobacco: Never Used    Tobacco comment: passive smoke with    Substance Use Topics    Alcohol use: Yes     Comment: rare Diagnoses and all orders for this visit:        5. Postmenopausal state  Patient reports she likely will not take bisphosphonate however would like to know the status of her bone density and decide depending on the results  -     DEXA BONE DENSITY STUDY AXIAL; Future    6. Essential hypertension  Discussed with patient about increasing her lisinopril to 40 mg. She reports that she discussed with Dr. Yuli Tucker and he did not encourage it as it may make her hypotensive. Discussed with patient and she will check her blood pressure and if consistently greater than 150/82 follow-up with Dr. Yuli Tucker. I think her blood pressure may be elevated today due to the situational stress regarding her family members.  -     LIPID PANEL; Future  -     METABOLIC PANEL, COMPREHENSIVE; Future    7. Situational stress  Discussed at length with patient. Her blood pressure is elevated and she is only getting about 4 hours of sleep. She notes chronically she does not sleep very well but this additional stress may be compromising her already decreased sleep. Given her age, sleep apnea, hypertension, hyperlipidemia I discussed with her that with her Covid risk her family members should sleep in another place to decrease contact especially if they are coming from another state or multiple states. She agrees and will discuss with him. 8. Vitamin D deficiency  Replete as needed  -     VITAMIN D, 25 HYDROXY; Future    9. Constipation, unspecified constipation type  We will check thyroid  -     TSH 3RD GENERATION; Future    10. B12 deficiency due to diet  Replete as needed  -     VITAMIN B12 & FOLATE; Future    11. Prediabetes  Recommend heart healthy diabetic diet like the DASH diet.  -     HEMOGLOBIN A1C WITH EAG; Future             Chief Complaint   Patient presents with   [facility] Annual Wellness Visit    Labs     Nonfasting. Please see Medicare wellness as above.       Subjective:   Karina Bowles is a 79 y.o. female with hypertension. Hypertension ROS: taking medications as instructed, no medication side effects noted, no TIA's, no chest pain on exertion, no dyspnea on exertion, no swelling of ankles. New concerns: Patient reports that she was told by Dr. Dionna Kessler not to increase her lisinopril to 40 mg as it would make her blood pressure go too low. .     Hyperlipidemia  Patient is taking statin no muscle aches or muscle pains. She is not having any chest pain or shortness of breath. Prediabetes  Labs reviewed with patient and would like to have rechecked. She is trying to eat a heart healthy diet. Potential for B12 deficiency  Patient does not eat very much red meat. Situational stress  Patient reports there has been some stress in the family. Her daughter from [place] would like to come to her house and spend the night during the [place] holidays. Patient reports that she is uncomfortable because of Covid.           Past Medical History:   Diagnosis Date    Essential hypertension, benign     Fibromyalgia     Macular degeneration 2020    dr. Sherryle Hammock Myalgia and myositis, unspecified     Other and unspecified hyperlipidemia 10/12/2010    Palpitations 10/12/2010    Dr. Walt Reza Sleep apnea     Dr. Dheeraj Jean, not taking cpap    SOB (shortness of breath)      Past Surgical History:   Procedure Laterality Date    HX HYSTERECTOMY      HX ORTHOPAEDIC      thumb and left foot     Social History     Socioeconomic History    Marital status:      Spouse name: Not on file    Number of children: Not on file    Years of education: Not on file    Highest education level: Not on file   Tobacco Use    Smoking status: Never Smoker    Smokeless tobacco: Never Used    Tobacco comment: passive smoke with    Substance and Sexual Activity    Alcohol use: Yes     Comment: rare    Drug use: No    Sexual activity: Never     Comment:  for 47 years   Social History Narrative        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  varicella-zoster recombinant, PF, (Shingrix, PF,) 50 mcg/0.5 mL susr injection 0.5mL by IntraMUSCular route once now and then repeat in 2-6 months 0.5 mL 1    lisinopriL (PRINIVIL, ZESTRIL) 30 mg tablet TAKE 1 TABLET BY MOUTH  DAILY 90 Tab 0    busPIRone (BUSPAR) 5 mg tablet Take 1 tab po in the am and midday and 1-2 tabs po in the evening  Indications: repeated episodes of anxiety 360 Tab 0    hydroCHLOROthiazide (HYDRODIURIL) 12.5 mg tablet TAKE 1 TABLET BY MOUTH  DAILY 30 Tab 0    atorvastatin (LIPITOR) 20 mg tablet Take 1 Tab by mouth daily. If cough please follow up with pcp - may be se of med. 90 Tab 0    mv,Ca,min-iron gluc-FA-biotin (Hair,Skin and Nails) 1 mg iron-66.7 mcg-1,000 mcg tab Take  by mouth.  albuterol (PROVENTIL HFA, VENTOLIN HFA, PROAIR HFA) 90 mcg/actuation inhaler Take 1 Puff by inhalation every four (4) hours as needed for Wheezing. 1 Inhaler 1    guaiFENesin ER (MUCINEX) 600 mg ER tablet Take 1 Tab by mouth two (2) times a day. Take with water 30 Tab 0    dicyclomine (BENTYL) 10 mg capsule Take 1 Cap by mouth four (4) times daily. 30 Cap 0    benzonatate (TESSALON PERLES) 100 mg capsule Take 100 mg by mouth three (3) times daily as needed for Cough.  famotidine (PEPCID) 20 mg tablet Take 20 mg by mouth two (2) times daily as needed.       diclofenac (VOLTAREN) 1 % gel Apply  to affected area four (4) times daily as needed.  vit C/E/Zn/coppr/lutein/zeaxan (PRESERVISION AREDS-2 PO) Take  by mouth.  nebivolol (BYSTOLIC) 5 mg tablet Take 1 tablet by mouth daily. 30 tablet 0    MULTIVITAMIN W-MINERALS/LUTEIN (CENTRUM SILVER PO) Take  by mouth. Indications: not taking      Cholecalciferol, Vitamin D3, (VITAMIN D3) 1,000 unit cap Take 2,000 Units by mouth.       montelukast (SINGULAIR) 10 mg tablet TAKE 1 TABLET BY MOUTH DAILY (Patient taking differently: Indications: NOT TAKING.) 90 Tab 1     Allergies   Allergen Reactions    Codeine Palpitations    Levofloxacin Other (comments)    Prednisone Palpitations    Vioxx [Rofecoxib] Palpitations       Review of Systems - General ROS: negative for - chills or fever  Cardiovascular ROS: no chest pain or dyspnea on exertion  Respiratory ROS: no cough, shortness of breath, or wheezing    Visit Vitals  BP (!) 157/83 (BP 1 Location: Left arm, BP Patient Position: Sitting)   Pulse (!) 54   Temp 97.5 °F (36.4 °C) (Oral)   Resp 12   Ht 5' (1.524 m)   Wt 141 lb (64 kg)   SpO2 96%   BMI 27.54 kg/m²           Constitutional: [x] Appears well-developed and well-nourished [x] No apparent distress      [] Abnormal -     Mental status: [x] Alert and awake  [x] Oriented to person/place/time [x] Able to follow commands    [] Abnormal -     Eyes:   EOM    [x]  Normal    [] Abnormal -   Sclera  [x]  Normal    [] Abnormal -          Discharge [x]  None visible   [] Abnormal -     HENT: [x] Normocephalic, atraumatic  [] Abnormal -   [x] Mouth/Throat: Mucous membranes are moist    External Ears [x] Normal  [] Abnormal -    Neck: [x] No visualized mass [] Abnormal -     Pulmonary/Chest: [x] Respiratory effort normal   [x] No visualized signs of difficulty breathing or respiratory distress        [] Abnormal -      Musculoskeletal:   [x] Normal gait with no signs of ataxia         [x] Normal range of motion of neck [] Abnormal -     Neurological:        [x] No Facial Asymmetry (Cranial nerve 7 motor function) (limited exam due to video visit)          [x] No gaze palsy        [] Abnormal -          Skin:        [x] No significant exanthematous lesions or discoloration noted on facial skin         [] Abnormal -            Psychiatric:       [x] Normal Affect [] Abnormal -        [x] No Hallucinations            ATTENTION:   This medical record was transcribed using an electronic medical records/speech recognition system. Although proofread, it may and can contain electronic, spelling and other errors. Corrections may be executed at a later time. Please feel free to contact us for any clarifications as needed. Aside from patient's Medicare wellness visit I spent an additional 25 minutes with this patient greater than 50% of the time was spent in management and counseling with regards to her situational stress, blood pressure and cholesterol.

## 2020-12-10 LAB
25(OH)D3+25(OH)D2 SERPL-MCNC: 28.9 NG/ML (ref 30–100)
ALBUMIN SERPL-MCNC: 4.1 G/DL (ref 3.6–4.6)
ALBUMIN/GLOB SERPL: 2.1 {RATIO} (ref 1.2–2.2)
ALP SERPL-CCNC: 64 IU/L (ref 39–117)
ALT SERPL-CCNC: 26 IU/L (ref 0–32)
AST SERPL-CCNC: 23 IU/L (ref 0–40)
BILIRUB SERPL-MCNC: 0.3 MG/DL (ref 0–1.2)
BUN SERPL-MCNC: 14 MG/DL (ref 8–27)
BUN/CREAT SERPL: 16 (ref 12–28)
CALCIUM SERPL-MCNC: 9.1 MG/DL (ref 8.7–10.3)
CHLORIDE SERPL-SCNC: 102 MMOL/L (ref 96–106)
CHOLEST SERPL-MCNC: 165 MG/DL (ref 100–199)
CO2 SERPL-SCNC: 24 MMOL/L (ref 20–29)
CREAT SERPL-MCNC: 0.86 MG/DL (ref 0.57–1)
EST. AVERAGE GLUCOSE BLD GHB EST-MCNC: 126 MG/DL
FOLATE SERPL-MCNC: 11.1 NG/ML
GLOBULIN SER CALC-MCNC: 2 G/DL (ref 1.5–4.5)
GLUCOSE SERPL-MCNC: 108 MG/DL (ref 65–99)
HBA1C MFR BLD: 6 % (ref 4.8–5.6)
HDLC SERPL-MCNC: 60 MG/DL
INTERPRETATION, 910389: NORMAL
LDLC SERPL CALC-MCNC: 85 MG/DL (ref 0–99)
POTASSIUM SERPL-SCNC: 4 MMOL/L (ref 3.5–5.2)
PROT SERPL-MCNC: 6.1 G/DL (ref 6–8.5)
SODIUM SERPL-SCNC: 139 MMOL/L (ref 134–144)
TRIGL SERPL-MCNC: 112 MG/DL (ref 0–149)
TSH SERPL DL<=0.005 MIU/L-ACNC: 1.69 UIU/ML (ref 0.45–4.5)
VIT B12 SERPL-MCNC: 340 PG/ML (ref 232–1245)
VLDLC SERPL CALC-MCNC: 20 MG/DL (ref 5–40)

## 2021-01-22 ENCOUNTER — IMMUNIZATION (OUTPATIENT)
Dept: INTERNAL MEDICINE CLINIC | Age: 81
End: 2021-01-22
Payer: MEDICARE

## 2021-01-22 DIAGNOSIS — Z23 ENCOUNTER FOR IMMUNIZATION: Primary | ICD-10-CM

## 2021-01-22 PROCEDURE — 91301 COVID-19, MRNA, LNP-S, PF, 100MCG/0.5ML DOSE(MODERNA): CPT | Performed by: FAMILY MEDICINE

## 2021-01-22 PROCEDURE — 0011A PR IMM ADMN SARSCOV2 100 MCG/0.5 ML 1ST DOSE: CPT | Performed by: FAMILY MEDICINE

## 2021-01-28 ENCOUNTER — TELEPHONE (OUTPATIENT)
Dept: INTERNAL MEDICINE CLINIC | Age: 81
End: 2021-01-28

## 2021-02-19 ENCOUNTER — IMMUNIZATION (OUTPATIENT)
Dept: INTERNAL MEDICINE CLINIC | Age: 81
End: 2021-02-19
Payer: MEDICARE

## 2021-02-19 DIAGNOSIS — Z23 ENCOUNTER FOR IMMUNIZATION: Primary | ICD-10-CM

## 2021-02-19 PROCEDURE — 91301 COVID-19, MRNA, LNP-S, PF, 100MCG/0.5ML DOSE(MODERNA): CPT | Performed by: FAMILY MEDICINE

## 2021-02-19 PROCEDURE — 0012A COVID-19, MRNA, LNP-S, PF, 100MCG/0.5ML DOSE(MODERNA): CPT | Performed by: FAMILY MEDICINE

## 2021-03-19 DIAGNOSIS — I10 ESSENTIAL HYPERTENSION: ICD-10-CM

## 2021-03-19 RX ORDER — ATORVASTATIN CALCIUM 20 MG/1
TABLET, FILM COATED ORAL
Qty: 90 TAB | Refills: 3 | Status: SHIPPED | OUTPATIENT
Start: 2021-03-19 | End: 2022-02-14

## 2021-03-19 RX ORDER — LISINOPRIL 30 MG/1
TABLET ORAL
Qty: 90 TAB | Refills: 3 | Status: SHIPPED | OUTPATIENT
Start: 2021-03-19 | End: 2022-02-14

## 2021-05-13 DIAGNOSIS — F43.9 SITUATIONAL STRESS: ICD-10-CM

## 2021-05-14 RX ORDER — BUSPIRONE HYDROCHLORIDE 5 MG/1
TABLET ORAL
Qty: 360 TAB | Refills: 0 | Status: SHIPPED | OUTPATIENT
Start: 2021-05-14 | End: 2021-07-30

## 2021-06-10 ENCOUNTER — OFFICE VISIT (OUTPATIENT)
Dept: INTERNAL MEDICINE CLINIC | Age: 81
End: 2021-06-10
Payer: MEDICARE

## 2021-06-10 VITALS
HEIGHT: 60 IN | WEIGHT: 141.2 LBS | SYSTOLIC BLOOD PRESSURE: 143 MMHG | BODY MASS INDEX: 27.72 KG/M2 | OXYGEN SATURATION: 96 % | RESPIRATION RATE: 12 BRPM | HEART RATE: 67 BPM | TEMPERATURE: 98.2 F | DIASTOLIC BLOOD PRESSURE: 68 MMHG

## 2021-06-10 DIAGNOSIS — I10 ESSENTIAL HYPERTENSION: ICD-10-CM

## 2021-06-10 DIAGNOSIS — R73.09 ELEVATED GLUCOSE: Primary | ICD-10-CM

## 2021-06-10 DIAGNOSIS — I49.9 CARDIAC ARRHYTHMIA, UNSPECIFIED CARDIAC ARRHYTHMIA TYPE: ICD-10-CM

## 2021-06-10 DIAGNOSIS — R53.83 FATIGUE, UNSPECIFIED TYPE: ICD-10-CM

## 2021-06-10 LAB
ALBUMIN SERPL-MCNC: 3.7 G/DL (ref 3.5–5)
ALBUMIN/GLOB SERPL: 1.3 {RATIO} (ref 1.1–2.2)
ALP SERPL-CCNC: 61 U/L (ref 45–117)
ALT SERPL-CCNC: 28 U/L (ref 12–78)
ANION GAP SERPL CALC-SCNC: 5 MMOL/L (ref 5–15)
AST SERPL-CCNC: 22 U/L (ref 15–37)
BILIRUB SERPL-MCNC: 0.6 MG/DL (ref 0.2–1)
BUN SERPL-MCNC: 15 MG/DL (ref 6–20)
BUN/CREAT SERPL: 17 (ref 12–20)
CALCIUM SERPL-MCNC: 9.7 MG/DL (ref 8.5–10.1)
CHLORIDE SERPL-SCNC: 108 MMOL/L (ref 97–108)
CO2 SERPL-SCNC: 28 MMOL/L (ref 21–32)
CREAT SERPL-MCNC: 0.87 MG/DL (ref 0.55–1.02)
ERYTHROCYTE [DISTWIDTH] IN BLOOD BY AUTOMATED COUNT: 14.5 % (ref 11.5–14.5)
EST. AVERAGE GLUCOSE BLD GHB EST-MCNC: 126 MG/DL
GLOBULIN SER CALC-MCNC: 2.9 G/DL (ref 2–4)
GLUCOSE SERPL-MCNC: 100 MG/DL (ref 65–100)
HBA1C MFR BLD: 6 % (ref 4–5.6)
HCT VFR BLD AUTO: 41.3 % (ref 35–47)
HGB BLD-MCNC: 13.4 G/DL (ref 11.5–16)
MCH RBC QN AUTO: 29.6 PG (ref 26–34)
MCHC RBC AUTO-ENTMCNC: 32.4 G/DL (ref 30–36.5)
MCV RBC AUTO: 91.4 FL (ref 80–99)
NRBC # BLD: 0 K/UL (ref 0–0.01)
NRBC BLD-RTO: 0 PER 100 WBC
PLATELET # BLD AUTO: 214 K/UL (ref 150–400)
PMV BLD AUTO: 10.4 FL (ref 8.9–12.9)
POTASSIUM SERPL-SCNC: 5 MMOL/L (ref 3.5–5.1)
PROT SERPL-MCNC: 6.6 G/DL (ref 6.4–8.2)
RBC # BLD AUTO: 4.52 M/UL (ref 3.8–5.2)
SODIUM SERPL-SCNC: 141 MMOL/L (ref 136–145)
TSH SERPL DL<=0.05 MIU/L-ACNC: 1.5 UIU/ML (ref 0.36–3.74)
WBC # BLD AUTO: 6.3 K/UL (ref 3.6–11)

## 2021-06-10 PROCEDURE — G8754 DIAS BP LESS 90: HCPCS | Performed by: INTERNAL MEDICINE

## 2021-06-10 PROCEDURE — 1090F PRES/ABSN URINE INCON ASSESS: CPT | Performed by: INTERNAL MEDICINE

## 2021-06-10 PROCEDURE — 1101F PT FALLS ASSESS-DOCD LE1/YR: CPT | Performed by: INTERNAL MEDICINE

## 2021-06-10 PROCEDURE — G8510 SCR DEP NEG, NO PLAN REQD: HCPCS | Performed by: INTERNAL MEDICINE

## 2021-06-10 PROCEDURE — G8399 PT W/DXA RESULTS DOCUMENT: HCPCS | Performed by: INTERNAL MEDICINE

## 2021-06-10 PROCEDURE — G8427 DOCREV CUR MEDS BY ELIG CLIN: HCPCS | Performed by: INTERNAL MEDICINE

## 2021-06-10 PROCEDURE — G8753 SYS BP > OR = 140: HCPCS | Performed by: INTERNAL MEDICINE

## 2021-06-10 PROCEDURE — G8536 NO DOC ELDER MAL SCRN: HCPCS | Performed by: INTERNAL MEDICINE

## 2021-06-10 PROCEDURE — 99214 OFFICE O/P EST MOD 30 MIN: CPT | Performed by: INTERNAL MEDICINE

## 2021-06-10 PROCEDURE — G8419 CALC BMI OUT NRM PARAM NOF/U: HCPCS | Performed by: INTERNAL MEDICINE

## 2021-06-10 PROCEDURE — 93000 ELECTROCARDIOGRAM COMPLETE: CPT | Performed by: INTERNAL MEDICINE

## 2021-06-10 RX ORDER — FEXOFENADINE HCL AND PSEUDOEPHEDRINE HCI 60; 120 MG/1; MG/1
1 TABLET, EXTENDED RELEASE ORAL EVERY 12 HOURS
COMMUNITY
End: 2021-06-10

## 2021-06-10 RX ORDER — MINERAL OIL
180 ENEMA (ML) RECTAL DAILY
Qty: 90 TABLET | Refills: 1 | Status: SHIPPED | OUTPATIENT
Start: 2021-06-10

## 2021-06-10 NOTE — PROGRESS NOTES
Diagnoses and all orders for this visit:    1. Elevated glucose  Encourage Mediterranean diet with olive oil in controlled portions  Monitoring  Discussed with patient that likely will control with diet  -     HEMOGLOBIN A1C WITH EAG; Future    2. Cardiac arrhythmia, unspecified cardiac arrhythmia type  Progressive shortness of breath  Evaluate for fib  Will fax to Dr. Shelbie Hernández  -     AMB POC EKG ROUTINE W/ 12 LEADS, [address], COMPREHENSIVE; Future    3. Essential hypertension  Home readings fluctuate 1 30-1 60  She will discuss this with Dr. Rosezella Carrel; Future    4. Fatigue, unspecified type  New symptom  Assess for A. Fib  Check baseline labs  -     CBC W/O DIFF; Future  -     TSH 3RD GENERATION; Future    Other orders  -     fexofenadine (ALLEGRA) 180 mg tablet; Take 1 Tablet by mouth daily. Hyperlipidemia  Stable continue atorvastatin  Labs reviewed with patient and she does not need a repeat. Patient Care Team   Patient Care Team:  Hilda Combs MD as PCP - General (Internal Medicine)  Hilda Combs MD as PCP - [address] [place] [address] Provider  Priya Daniel MD (Cardio Vascular Surgery)    History     Subjective:   Araceli Soria is a 79 y.o. female with hypertension. Hypertension ROS: taking medications as instructed, no medication side effects noted, no TIA's, no chest pain on exertion, no dyspnea on exertion, no swelling of ankles. New concerns: She reports her blood pressure has been fluctuating between 130 and 160. Per prior evaluations with me cardiology preferred her to have a little bit higher of a pressure. She notes overall stability but does note more progressive fatigue and shortness of breath over the past 3 months. She has some slight  fluid in her right ankle. Elevated glucose  Labs reviewed with patient  Mediterranean diet discussed and reviewed with her.       Situational stress  Discussed daughter situation where she will not get the Covid vaccine but everyone else in the family has been vaccinated. Patient notes that it frustrates her that her daughter will not get vaccinated. She is overall doing well. She is taking her BuSpar which has been beneficial.      Hyperlipidemia  She is fasting today. She does not have chest pain or shortness of breath. She has been walking for 35 minutes a day. She does not have symptoms. Past Medical History:   Diagnosis Date    Essential hypertension, benign     Fibromyalgia     Macular degeneration 2020    dr. Castillo Labrador Myalgia and myositis, unspecified     Other and unspecified hyperlipidemia 10/12/2010    Palpitations 10/12/2010    Dr. Rocha Seat Sleep apnea     Dr. Montalvo Mom, not taking cpap    SOB (shortness of breath)      Past Surgical History:   Procedure Laterality Date    HX HYSTERECTOMY      HX ORTHOPAEDIC      thumb and left foot     Social History     Socioeconomic History    Marital status:      Spouse name: Not on file    Number of children: Not on file    Years of education: Not on file    Highest education level: Not on file   Tobacco Use    Smoking status: Never Smoker    Smokeless tobacco: Never Used    Tobacco comment: passive smoke with    Substance and Sexual Activity    Alcohol use: Yes     Comment: rare    Drug use: No    Sexual activity: Never     Comment:  for 47 years   Social History Narrative        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,    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            3 children: overweight son, smoker son 48 and 46,     Daughter healthy     Social Determinants of Health     Financial Resource Strain:     Difficulty of Paying Living Expenses:    Food Insecurity:     Worried About [address] in the Last Year:    [address] in the Last Year:    Transportation Needs:     Lack of Transportation (Medical):      Lack of Transportation (Non-Medical):    Physical Activity:  Days of Exercise per Week:     Minutes of Exercise per Session:    Stress:     Feeling of Stress :    Social Connections:     Frequency of Communication with Friends and Family:     Frequency of Social Gatherings with Friends and Family:     Attends Orthodoxy Services:     Active Member of Clubs or Organizations:     Attends Club or Organization Meetings:     Marital Status:      Family History   Problem Relation Age of Onset    Diabetes Mother 61    Cancer Maternal Grandmother         colon ca    Diabetes Maternal Grandmother 61    Cancer Paternal Grandmother         intestinal ca    Heart Attack Paternal Grandmother     Stroke Paternal Grandmother     Heart Attack Paternal Grandfather     Stroke Paternal Grandfather      Current Outpatient Medications   Medication Sig Dispense Refill    fexofenadine-pseudoephedrine (Allegra-D 12 Hour)  mg Tb12 Take 1 Tablet by mouth every twelve (12) hours.  busPIRone (BUSPAR) 5 mg tablet TAKE 1 TABLET BY MOUTH IN  THE MORNING AND MIDDAY AND  1 TO 2 TABLETS BY MOUTH IN  THE EVENING FOR  ANXIETY 360 Tab 0    hydroCHLOROthiazide (HYDRODIURIL) 12.5 mg tablet TAKE 1 TABLET BY MOUTH  DAILY 90 Tab 0    lisinopriL (PRINIVIL, ZESTRIL) 30 mg tablet TAKE 1 TABLET BY MOUTH  DAILY 90 Tab 3    atorvastatin (LIPITOR) 20 mg tablet TAKE 1 TABLET BY MOUTH  DAILY - IF COUGH PLEASE  FOLLOW UP WITH PCP - MAY BE S/E OF MED 90 Tab 3    loratadine (Claritin) 10 mg tablet Take 10 mg by mouth.  varicella-zoster recombinant, PF, (Shingrix, PF,) 50 mcg/0.5 mL susr injection 0.5mL by IntraMUSCular route once now and then repeat in 2-6 months 0.5 mL 1    albuterol (PROVENTIL HFA, VENTOLIN HFA, PROAIR HFA) 90 mcg/actuation inhaler Take 1 Puff by inhalation every four (4) hours as needed for Wheezing. 1 Inhaler 1    guaiFENesin ER (MUCINEX) 600 mg ER tablet Take 1 Tab by mouth two (2) times a day.  Take with water 30 Tab 0    dicyclomine (BENTYL) 10 mg capsule Take 1 Cap by mouth four (4) times daily. (Patient taking differently: Take 10 mg by mouth four (4) times daily. As needed.) 30 Cap 0    benzonatate (TESSALON PERLES) 100 mg capsule Take 100 mg by mouth three (3) times daily as needed for Cough.  famotidine (PEPCID) 20 mg tablet Take 20 mg by mouth two (2) times daily as needed.  diclofenac (VOLTAREN) 1 % gel Apply  to affected area four (4) times daily as needed.  vit C/E/Zn/coppr/lutein/zeaxan (PRESERVISION AREDS-2 PO) Take  by mouth.  nebivolol (BYSTOLIC) 5 mg tablet Take 1 tablet by mouth daily. 30 tablet 0    Cholecalciferol, Vitamin D3, (VITAMIN D3) 1,000 unit cap Take 2,000 Units by mouth.  mv,Ca,min-iron gluc-FA-biotin (Hair,Skin and Nails) 1 mg iron-66.7 mcg-1,000 mcg tab Take  by mouth. (Patient not taking: Reported on 6/10/2021)      montelukast (SINGULAIR) 10 mg tablet TAKE 1 TABLET BY MOUTH DAILY (Patient not taking: Reported on 6/10/2021) 90 Tab 1    MULTIVITAMIN W-MINERALS/LUTEIN (CENTRUM SILVER PO) Take  by mouth.  Indications: not taking (Patient not taking: Reported on 6/10/2021)       Allergies   Allergen Reactions    Codeine Palpitations    Levofloxacin Other (comments)    Prednisone Palpitations    Vioxx [Rofecoxib] Palpitations       Review of Systems - General ROS: negative for - chills or fever  Cardiovascular ROS: no chest pain or dyspnea on exertion  Respiratory ROS: no cough, shortness of breath, or wheezing    Visit Vitals  BP (!) 143/68 (BP 1 Location: Left upper arm, BP Patient Position: Sitting, BP Cuff Size: Adult)   Pulse 67   Temp 98.2 °F (36.8 °C) (Oral)   Resp 12   Ht 5' (1.524 m)   Wt 141 lb 3.2 oz (64 kg)   SpO2 96%   BMI 27.58 kg/m²           Constitutional: [x] Appears well-developed and well-nourished [x] No apparent distress      [] Abnormal -     Mental status: [x] Alert and awake  [x] Oriented to person/place/time [x] Able to follow commands    [] Abnormal -     Eyes:   EOM    [x]  Normal    [] Abnormal -   Sclera  [x]  Normal    [] Abnormal -          Discharge [x]  None visible   [] Abnormal -     HENT: [x] Normocephalic, atraumatic  [] Abnormal -   [x] Mouth/Throat: Mucous membranes are moist    External Ears [x] Normal  [] Abnormal -    Neck: [x] No visualized mass [] Abnormal -     Pulmonary/Chest: [x] Respiratory effort normal   [x] No visualized signs of difficulty breathing or respiratory distress        [] Abnormal -      Musculoskeletal:   [x] Normal gait with no signs of ataxia         [x] Normal range of motion of neck        [] Abnormal -     Neurological:        [x] No Facial Asymmetry (Cranial nerve 7 motor function) (limited exam due to video visit)          [x] No gaze palsy        [] Abnormal -          Skin:        [x] No significant exanthematous lesions or discoloration noted on facial skin         [] Abnormal -            Psychiatric:       [x] Normal Affect [] Abnormal -        [x] No Hallucinations            ATTENTION:   This medical record was transcribed using an electronic medical records/speech recognition system. Although proofread, it may and can contain electronic, spelling and other errors. Corrections may be executed at a later time. Please feel free to contact us for any clarifications as needed. I spent an additional 34 minutes with this patient greater than 50% of the time was spent in management and counseling with regards to her situational stress, blood pressure and cholesterol.

## 2021-07-06 ENCOUNTER — PATIENT MESSAGE (OUTPATIENT)
Dept: INTERNAL MEDICINE CLINIC | Age: 81
End: 2021-07-06

## 2021-07-06 NOTE — TELEPHONE ENCOUNTER
From: Cristiano Arreola  To: Otto Garland MD  Sent: 7/6/2021 7:48 AM EDT  Subject: Prescription Question    Please send a prescription renewal to [facility]. Its a compound they make for insect bites, poison oak, etc. Nothing else works as well as this. For many years it was available without an Rx. Their phone # is [phone]. Thank you.

## 2021-07-30 DIAGNOSIS — F43.9 SITUATIONAL STRESS: ICD-10-CM

## 2021-07-30 RX ORDER — BUSPIRONE HYDROCHLORIDE 5 MG/1
TABLET ORAL
Qty: 360 TABLET | Refills: 0 | Status: SHIPPED | OUTPATIENT
Start: 2021-07-30 | End: 2021-10-17

## 2021-10-03 RX ORDER — HYDROCHLOROTHIAZIDE 12.5 MG/1
TABLET ORAL
Qty: 90 TABLET | Refills: 3 | Status: SHIPPED | OUTPATIENT
Start: 2021-10-03 | End: 2022-01-28 | Stop reason: SDUPTHER

## 2021-10-16 DIAGNOSIS — F43.9 SITUATIONAL STRESS: ICD-10-CM

## 2021-10-17 RX ORDER — BUSPIRONE HYDROCHLORIDE 5 MG/1
TABLET ORAL
Qty: 360 TABLET | Refills: 0 | Status: SHIPPED | OUTPATIENT
Start: 2021-10-17 | End: 2021-12-06

## 2021-12-06 ENCOUNTER — OFFICE VISIT (OUTPATIENT)
Dept: INTERNAL MEDICINE CLINIC | Age: 81
End: 2021-12-06
Payer: MEDICARE

## 2021-12-06 ENCOUNTER — TELEPHONE (OUTPATIENT)
Dept: INTERNAL MEDICINE CLINIC | Age: 81
End: 2021-12-06

## 2021-12-06 VITALS
SYSTOLIC BLOOD PRESSURE: 147 MMHG | RESPIRATION RATE: 14 BRPM | WEIGHT: 141 LBS | DIASTOLIC BLOOD PRESSURE: 67 MMHG | HEART RATE: 48 BPM | TEMPERATURE: 98 F | HEIGHT: 60 IN | BODY MASS INDEX: 27.68 KG/M2 | OXYGEN SATURATION: 95 %

## 2021-12-06 DIAGNOSIS — F43.9 SITUATIONAL STRESS: ICD-10-CM

## 2021-12-06 DIAGNOSIS — I10 ESSENTIAL HYPERTENSION: ICD-10-CM

## 2021-12-06 DIAGNOSIS — L73.2 HYDRADENITIS: Primary | ICD-10-CM

## 2021-12-06 PROCEDURE — G8754 DIAS BP LESS 90: HCPCS | Performed by: INTERNAL MEDICINE

## 2021-12-06 PROCEDURE — G8536 NO DOC ELDER MAL SCRN: HCPCS | Performed by: INTERNAL MEDICINE

## 2021-12-06 PROCEDURE — G8510 SCR DEP NEG, NO PLAN REQD: HCPCS | Performed by: INTERNAL MEDICINE

## 2021-12-06 PROCEDURE — G8419 CALC BMI OUT NRM PARAM NOF/U: HCPCS | Performed by: INTERNAL MEDICINE

## 2021-12-06 PROCEDURE — 99214 OFFICE O/P EST MOD 30 MIN: CPT | Performed by: INTERNAL MEDICINE

## 2021-12-06 PROCEDURE — 1101F PT FALLS ASSESS-DOCD LE1/YR: CPT | Performed by: INTERNAL MEDICINE

## 2021-12-06 PROCEDURE — G8753 SYS BP > OR = 140: HCPCS | Performed by: INTERNAL MEDICINE

## 2021-12-06 PROCEDURE — G8399 PT W/DXA RESULTS DOCUMENT: HCPCS | Performed by: INTERNAL MEDICINE

## 2021-12-06 PROCEDURE — G8427 DOCREV CUR MEDS BY ELIG CLIN: HCPCS | Performed by: INTERNAL MEDICINE

## 2021-12-06 PROCEDURE — 1090F PRES/ABSN URINE INCON ASSESS: CPT | Performed by: INTERNAL MEDICINE

## 2021-12-06 RX ORDER — BUSPIRONE HYDROCHLORIDE 10 MG/1
10 TABLET ORAL 2 TIMES DAILY
Qty: 180 TABLET | Refills: 0
Start: 2021-12-06 | End: 2022-09-28

## 2021-12-06 RX ORDER — MUPIROCIN 20 MG/G
OINTMENT TOPICAL DAILY
Qty: 22 G | Refills: 0 | Status: SHIPPED | OUTPATIENT
Start: 2021-12-06 | End: 2022-08-23

## 2021-12-06 RX ORDER — CEPHALEXIN 500 MG/1
500 CAPSULE ORAL 4 TIMES DAILY
Qty: 40 CAPSULE | Refills: 0 | Status: SHIPPED | OUTPATIENT
Start: 2021-12-06

## 2021-12-06 NOTE — TELEPHONE ENCOUNTER
Patient called to make appointment about a knot she has on her labia - only wanted to see provider so I put her in earliest slot I could which is Thursday 9th at 1040 AM - patient states she is very uncomfortable, this is red, hard, draining some puss. States she is has been using peroxide and neosporin on it and nothing is helping.

## 2021-12-06 NOTE — PROGRESS NOTES
Diagnoses and all orders for this visit:    1. Hydradenitis   Right lateral labia  Discussed with patient antibacterial soap, Keflex and Bactroban  If no improvement may need I&D  No fevers  -     mupirocin (BACTROBAN) 2 % ointment; Apply  to affected area daily. -     cephALEXin (KEFLEX) 500 mg capsule; Take 1 Capsule by mouth four (4) times daily. 2. Situational stress  Not optimally controlled  We will try higher dose of BuSpar  Per review she did benefit from BuSpar initially. We will increase her dose. -     busPIRone (BUSPAR) 10 mg tablet; Take 1 Tablet by mouth two (2) times a day. 3. Essential hypertension  Stable  Continue meds           Chief Complaint   Patient presents with    Vaginal Swelling       Hidradenitis  Patient reports increase pain with sitting at her right groin area. She notes she also noticed some discharge. No blood. Symptoms in 1961 pregnant with her daughter. She is not having any fevers or chills. There is a significant amount of pain. Subjective:   Nathan Urrutia is a 79 y.o. female with hypertension. Hypertension ROS: taking medications as instructed, no medication side effects noted, no TIA's, no chest pain on exertion, no dyspnea on exertion, no swelling of ankles. New concerns: Notes she was told by her cardiologist to not increase medication. Situational stress  Patient reports daily stressors. She is active socially. 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 She has 1 son who she is very close to along with his children. Her other son does not really communicate with her and her daughter has not been Covid vaccinated so she has issues with this. She notes she also has been told that she has mold under her house and it will cost her $13,000 to fix. She is used to being very active and she really looks forward to activities with her family. She is taking BuSpar 5 mg in the morning and 10 in the evening.   She does not appreciate any benefit at this dose.    Past Medical History:   Diagnosis Date    Essential hypertension, benign     Fibromyalgia     Macular degeneration 2020    dr. Bar Haines Myalgia and myositis, unspecified     Other and unspecified hyperlipidemia 10/12/2010    Palpitations 10/12/2010    Dr. Sofy Cardenas Sleep apnea     Dr. 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,    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            3 children: overweight son, smoker son 48 and 46,     Daughter healthy     Family History   Problem Relation Age of Onset    Diabetes Mother 61    Cancer Maternal Grandmother         colon ca    Diabetes Maternal Grandmother 61    Cancer Paternal Grandmother         intestinal ca    Heart Attack Paternal Grandmother     Stroke Paternal Grandmother     Heart Attack Paternal Grandfather     Stroke Paternal Grandfather      Current Outpatient Medications   Medication Sig Dispense Refill    busPIRone (BUSPAR) 5 mg tablet TAKE 1 TABLET BY MOUTH IN  THE MORNING AND MIDDAY AND  1 TO 2 TABLETS BY MOUTH IN  THE EVENING FOR ANXIETY 360 Tablet 0    hydroCHLOROthiazide (HYDRODIURIL) 12.5 mg tablet TAKE 1 TABLET BY MOUTH  DAILY 90 Tablet 3    dicyclomine (BENTYL) 10 mg capsule Take 1 Capsule by mouth four (4) times daily. As needed. 30 Capsule 0    fexofenadine (ALLEGRA) 180 mg tablet Take 1 Tablet by mouth daily.  90 Tablet 1    lisinopriL (PRINIVIL, ZESTRIL) 30 mg tablet TAKE 1 TABLET BY MOUTH  DAILY 90 Tab 3    atorvastatin (LIPITOR) 20 mg tablet TAKE 1 TABLET BY MOUTH  DAILY - IF COUGH PLEASE  FOLLOW UP WITH PCP - MAY BE S/E OF MED 90 Tab 3    varicella-zoster recombinant, PF, (Shingrix, PF,) 50 mcg/0.5 mL susr injection 0.5mL by IntraMUSCular route once now and then repeat in 2-6 months 0.5 mL 1    albuterol (PROVENTIL HFA, VENTOLIN HFA, PROAIR HFA) 90 mcg/actuation inhaler Take 1 Puff by inhalation every four (4) hours as needed for Wheezing. 1 Inhaler 1    guaiFENesin ER (MUCINEX) 600 mg ER tablet Take 1 Tab by mouth two (2) times a day. Take with water 30 Tab 0    benzonatate (TESSALON PERLES) 100 mg capsule Take 100 mg by mouth three (3) times daily as needed for Cough.  famotidine (PEPCID) 20 mg tablet Take 20 mg by mouth two (2) times daily as needed.  diclofenac (VOLTAREN) 1 % gel Apply  to affected area four (4) times daily as needed.  vit C/E/Zn/coppr/lutein/zeaxan (PRESERVISION AREDS-2 PO) Take  by mouth.  nebivolol (BYSTOLIC) 5 mg tablet Take 1 tablet by mouth daily. 30 tablet 0    Cholecalciferol, Vitamin D3, (VITAMIN D3) 1,000 unit cap Take 2,000 Units by mouth.        Allergies   Allergen Reactions    Codeine Palpitations    Levofloxacin Other (comments)    Prednisone Palpitations    Vioxx [Rofecoxib] Palpitations       Review of Systems - General ROS: negative for - chills or fever  Cardiovascular ROS: no chest pain or dyspnea on exertion  Respiratory ROS: no cough, shortness of breath, or wheezing    Visit Vitals  BP (!) 147/67 (BP 1 Location: Left upper arm, BP Patient Position: Sitting, BP Cuff Size: Adult)   Pulse (!) 48   Temp 98 °F (36.7 °C) (Oral)   Resp 14   Ht 5' (1.524 m)   Wt 141 lb (64 kg)   SpO2 95%   BMI 27.54 kg/m²     Constitutional: [x] Appears well-developed and well-nourished [x] No apparent distress      [] Abnormal -     Mental status: [x] Alert and awake  [x] Oriented to person/place/time [x] Able to follow commands    [] Abnormal -     Eyes:   EOM    [x]  Normal    [] Abnormal -   Sclera  [x]  Normal    [] Abnormal -          Discharge [x]  None visible   [] Abnormal -     HENT: [x] Normocephalic, atraumatic  [] Abnormal -   [x] Mouth/Throat: Mucous membranes are moist    External Ears [x] Normal  [] Abnormal -    Neck: [x] No visualized mass [] Abnormal -     Pulmonary/Chest: [x] Respiratory effort normal   [x] No visualized signs of difficulty breathing or respiratory distress        [] Abnormal -      Musculoskeletal:   [x] Normal gait with no signs of ataxia         [x] Normal range of motion of neck        [] Abnormal -     Neurological:        [x] No Facial Asymmetry (Cranial nerve 7 motor function) (limited exam due to video visit)          [x] No gaze palsy        [] Abnormal -          Skin:        [x] No significant exanthematous lesions or discoloration noted on facial skin         [] Abnormal -            Psychiatric:       [x] Normal Affect [] Abnormal -        [x] No Hallucinations      ATTENTION:   This medical record was transcribed using an electronic medical records/speech recognition system. Although proofread, it may and can contain electronic, spelling and other errors. Corrections may be executed at a later time. Please contact us for any clarifications as needed.

## 2021-12-07 ENCOUNTER — PATIENT MESSAGE (OUTPATIENT)
Dept: INTERNAL MEDICINE CLINIC | Age: 81
End: 2021-12-07

## 2021-12-07 NOTE — TELEPHONE ENCOUNTER
From: Maria Jessica  To: Susana Pimentel MD  Sent: 12/7/2021 11:00 AM EST  Subject: Flu shot - September 2021    I had my flu shot September 2021. It is not included on my record.

## 2021-12-27 ENCOUNTER — PATIENT MESSAGE (OUTPATIENT)
Dept: INTERNAL MEDICINE CLINIC | Age: 81
End: 2021-12-27

## 2021-12-27 RX ORDER — TRIAMCINOLONE ACETONIDE 1 MG/G
CREAM TOPICAL 2 TIMES DAILY
Qty: 15 G | Refills: 0 | Status: SHIPPED | OUTPATIENT
Start: 2021-12-27 | End: 2022-08-23

## 2021-12-27 NOTE — TELEPHONE ENCOUNTER
From: [facility]  To: Brian Calloway MD  Sent: 2021 1:37 PM EST  Subject: Rx for Triamcinolone Acetonide Ointment Usp 1%    I need a prescription for Triamcinolone Acetonide Ointment Usp 1%. I have rash/irritation/inflammation on my thumb that flares up every so often. This is the only product that seems to calm it down/clear it up.  Thank you  Lorin Meneses  : 1940    [place]

## 2022-01-27 ENCOUNTER — PATIENT MESSAGE (OUTPATIENT)
Dept: INTERNAL MEDICINE CLINIC | Age: 82
End: 2022-01-27

## 2022-01-27 NOTE — TELEPHONE ENCOUNTER
From: Justice Garcia  To: Karma Goetz MD  Sent: 1/27/2022 11:23 AM EST  Subject: HydroCHLOROthiaz 12.5 mg    I have run out of HCTZ. Please place a refill request with [address], [place], [address] [facility]. I will also need a new Rx placed with [facility]. Thank you.

## 2022-01-28 RX ORDER — HYDROCHLOROTHIAZIDE 12.5 MG/1
12.5 TABLET ORAL DAILY
Qty: 30 TABLET | Refills: 0 | Status: SHIPPED | OUTPATIENT
Start: 2022-01-28 | End: 2022-09-07

## 2022-06-28 DIAGNOSIS — R05.9 COUGH: ICD-10-CM

## 2022-06-28 DIAGNOSIS — U07.1 COVID-19: Primary | ICD-10-CM

## 2022-06-28 RX ORDER — ALBUTEROL SULFATE 90 UG/1
1 AEROSOL, METERED RESPIRATORY (INHALATION)
Qty: 1 EACH | Refills: 1 | Status: SHIPPED | OUTPATIENT
Start: 2022-06-28

## 2022-07-12 DIAGNOSIS — F43.9 SITUATIONAL STRESS: ICD-10-CM

## 2022-07-12 RX ORDER — BUSPIRONE HYDROCHLORIDE 5 MG/1
TABLET ORAL
Qty: 360 TABLET | Refills: 0 | Status: SHIPPED | OUTPATIENT
Start: 2022-07-12 | End: 2022-09-28

## 2022-08-23 ENCOUNTER — OFFICE VISIT (OUTPATIENT)
Dept: INTERNAL MEDICINE CLINIC | Age: 82
End: 2022-08-23
Payer: MEDICARE

## 2022-08-23 VITALS
RESPIRATION RATE: 15 BRPM | DIASTOLIC BLOOD PRESSURE: 78 MMHG | SYSTOLIC BLOOD PRESSURE: 165 MMHG | HEIGHT: 60 IN | OXYGEN SATURATION: 96 % | TEMPERATURE: 97.9 F | HEART RATE: 61 BPM | BODY MASS INDEX: 26.46 KG/M2 | WEIGHT: 134.8 LBS

## 2022-08-23 DIAGNOSIS — Z12.31 ENCOUNTER FOR SCREENING MAMMOGRAM FOR MALIGNANT NEOPLASM OF BREAST: ICD-10-CM

## 2022-08-23 DIAGNOSIS — R53.83 FATIGUE, UNSPECIFIED TYPE: ICD-10-CM

## 2022-08-23 DIAGNOSIS — Z00.00 MEDICARE ANNUAL WELLNESS VISIT, SUBSEQUENT: Primary | ICD-10-CM

## 2022-08-23 DIAGNOSIS — Z12.11 SCREEN FOR COLON CANCER: ICD-10-CM

## 2022-08-23 DIAGNOSIS — F43.9 SITUATIONAL STRESS: ICD-10-CM

## 2022-08-23 DIAGNOSIS — I10 ESSENTIAL HYPERTENSION: ICD-10-CM

## 2022-08-23 DIAGNOSIS — Z13.31 SCREENING FOR DEPRESSION: ICD-10-CM

## 2022-08-23 DIAGNOSIS — Z78.0 POSTMENOPAUSAL STATE: ICD-10-CM

## 2022-08-23 DIAGNOSIS — Z13.39 SCREENING FOR ALCOHOLISM: ICD-10-CM

## 2022-08-23 DIAGNOSIS — I49.3 PVC (PREMATURE VENTRICULAR CONTRACTION): ICD-10-CM

## 2022-08-23 DIAGNOSIS — R73.09 ELEVATED GLUCOSE: ICD-10-CM

## 2022-08-23 LAB
ALBUMIN SERPL-MCNC: 3.6 G/DL (ref 3.5–5)
ALBUMIN/GLOB SERPL: 1.1 {RATIO} (ref 1.1–2.2)
ALP SERPL-CCNC: 62 U/L (ref 45–117)
ALT SERPL-CCNC: 36 U/L (ref 12–78)
ANION GAP SERPL CALC-SCNC: 5 MMOL/L (ref 5–15)
AST SERPL-CCNC: 22 U/L (ref 15–37)
BILIRUB SERPL-MCNC: 0.5 MG/DL (ref 0.2–1)
BUN SERPL-MCNC: 15 MG/DL (ref 6–20)
BUN/CREAT SERPL: 16 (ref 12–20)
CALCIUM SERPL-MCNC: 9.2 MG/DL (ref 8.5–10.1)
CHLORIDE SERPL-SCNC: 107 MMOL/L (ref 97–108)
CHOLEST SERPL-MCNC: 158 MG/DL
CO2 SERPL-SCNC: 29 MMOL/L (ref 21–32)
CREAT SERPL-MCNC: 0.93 MG/DL (ref 0.55–1.02)
ERYTHROCYTE [DISTWIDTH] IN BLOOD BY AUTOMATED COUNT: 14.8 % (ref 11.5–14.5)
EST. AVERAGE GLUCOSE BLD GHB EST-MCNC: 123 MG/DL
GLOBULIN SER CALC-MCNC: 3.2 G/DL (ref 2–4)
GLUCOSE SERPL-MCNC: 100 MG/DL (ref 65–100)
HBA1C MFR BLD: 5.9 % (ref 4–5.6)
HCT VFR BLD AUTO: 42.7 % (ref 35–47)
HDLC SERPL-MCNC: 64 MG/DL
HDLC SERPL: 2.5 {RATIO} (ref 0–5)
HGB BLD-MCNC: 14.1 G/DL (ref 11.5–16)
LDLC SERPL CALC-MCNC: 77.8 MG/DL (ref 0–100)
MCH RBC QN AUTO: 30 PG (ref 26–34)
MCHC RBC AUTO-ENTMCNC: 33 G/DL (ref 30–36.5)
MCV RBC AUTO: 90.9 FL (ref 80–99)
NRBC # BLD: 0 K/UL (ref 0–0.01)
NRBC BLD-RTO: 0 PER 100 WBC
PLATELET # BLD AUTO: 234 K/UL (ref 150–400)
PMV BLD AUTO: 10 FL (ref 8.9–12.9)
POTASSIUM SERPL-SCNC: 3.8 MMOL/L (ref 3.5–5.1)
PROT SERPL-MCNC: 6.8 G/DL (ref 6.4–8.2)
RBC # BLD AUTO: 4.7 M/UL (ref 3.8–5.2)
SODIUM SERPL-SCNC: 141 MMOL/L (ref 136–145)
TRIGL SERPL-MCNC: 81 MG/DL (ref ?–150)
TSH SERPL DL<=0.05 MIU/L-ACNC: 1.51 UIU/ML (ref 0.36–3.74)
VLDLC SERPL CALC-MCNC: 16.2 MG/DL
WBC # BLD AUTO: 6.3 K/UL (ref 3.6–11)

## 2022-08-23 PROCEDURE — G8417 CALC BMI ABV UP PARAM F/U: HCPCS | Performed by: INTERNAL MEDICINE

## 2022-08-23 PROCEDURE — G0439 PPPS, SUBSEQ VISIT: HCPCS | Performed by: INTERNAL MEDICINE

## 2022-08-23 PROCEDURE — G8754 DIAS BP LESS 90: HCPCS | Performed by: INTERNAL MEDICINE

## 2022-08-23 PROCEDURE — G8427 DOCREV CUR MEDS BY ELIG CLIN: HCPCS | Performed by: INTERNAL MEDICINE

## 2022-08-23 PROCEDURE — 99214 OFFICE O/P EST MOD 30 MIN: CPT | Performed by: INTERNAL MEDICINE

## 2022-08-23 PROCEDURE — G8536 NO DOC ELDER MAL SCRN: HCPCS | Performed by: INTERNAL MEDICINE

## 2022-08-23 PROCEDURE — 1101F PT FALLS ASSESS-DOCD LE1/YR: CPT | Performed by: INTERNAL MEDICINE

## 2022-08-23 PROCEDURE — G8399 PT W/DXA RESULTS DOCUMENT: HCPCS | Performed by: INTERNAL MEDICINE

## 2022-08-23 PROCEDURE — G8753 SYS BP > OR = 140: HCPCS | Performed by: INTERNAL MEDICINE

## 2022-08-23 PROCEDURE — 1090F PRES/ABSN URINE INCON ASSESS: CPT | Performed by: INTERNAL MEDICINE

## 2022-08-23 PROCEDURE — G8510 SCR DEP NEG, NO PLAN REQD: HCPCS | Performed by: INTERNAL MEDICINE

## 2022-08-23 RX ORDER — MEXILETINE HYDROCHLORIDE 150 MG/1
150 CAPSULE ORAL 2 TIMES DAILY
COMMUNITY
Start: 2022-08-08

## 2022-08-23 NOTE — PATIENT INSTRUCTIONS

## 2022-08-23 NOTE — PROGRESS NOTES
Diagnoses and all orders for this visit:    1. Medicare annual wellness visit, subsequent  Medicare wellness completed  She has been set up to see Dr. Gianna Triplett but she should not have a colonoscopy given risk during prep and her underlying heart condition  She would like to see the GI specialist for her advice regarding her episodic abdominal pains        2. Essential hypertension  Not controlled  She was told not to increase ACE inhibitor but given PVCs she can likely increase her Bystolic  She will check her blood pressure  Also advised that if she is having difficulty getting into see cardiology she can try and see the nurse practitioner who will contact the cardiologist if needed  -     LIPID PANEL; Future  -     METABOLIC PANEL, COMPREHENSIVE; Future  Increase Bystolic to 10 mg daily  3. PVC (premature ventricular contraction)  Not controlled   frustrated that she is not able to see her heart doctors  I pulled notes from general cardiology and EP and reviewed with her  We will check her thyroid to ensure not aggravating symptoms  I think she should increase her Bystolic. She will touch base with cardiology NP  -     TSH 3RD GENERATION; Future  We discussed possibly cardiac rehab to evaluate her parameters of exercise  She would like to go to the gym. I think this would be good for her physically but also mentally as well. 4. Elevated glucose  Family history of diabetes-     HEMOGLOBIN A1C WITH EAG; Future    5. Fatigue, unspecified type  She is having some fatigue but not all the time  -     TSH 3RD GENERATION; Future  -     CBC W/O DIFF; Future    6. Screen for colon cancer  We discussed screening and I do not think she is a good candidate for colonoscopy  She is not having any symptoms of blood in her stool    7. Encounter for screening mammogram for malignant neoplasm of breast  -     NELA MAMMO BI SCREENING INCL CAD; Future    8. Postmenopausal state  -     DEXA BONE DENSITY STUDY AXIAL; Future    9. Screening for alcoholism  No alcohol issues    10. Screening for depression  No depression  However we did discuss increasing behavioral changes to improve her mental health and social exercise  She will try to join a gym to yoga and engage more with others     Situational stress  Frustrated with medical situation and difficulty getting timely appointments with specialist expressed by patient  Enjoys family and discussed light activity and monitor for her PvC symptoms  She is interested in being more engaged socially. We discussed going to the gym for yoga and light exercise to socialize as well. She will follow-up with cardiology with regards to her PVCs and cardiac rehab had possibly. She can do a ACAC prep after she gets a prescription from her cardiologist  We will work with her behaviorally in terms of getting her engaged back socially which will be helpful for her overall health    Follow-up in 3 months or earlier if needed    Chief Complaint   Patient presents with    Annual Wellness Visit     Right ear fullness and pain. Labs     Fasting. PVCs  Occurs more at night  Occurs more with postitional changes  Does not have instruction re: how much she can do  Walking for 30min weather permitting  Cardiac parameters intersted in cardiac rehab- pvcs    Frustration re: not able to see her specialists    Abdominal pain  Colonscopy was done April 2018 by Dr. Nadia Ugarte. She does not recall if she needed a follow-up. She has a follow-up to see Dr. Faizan Mack  About once a month she has significant discomfort in abdome  Needs dicyclome about once a month  MOM also helpful      Subjective:   Bhupinder Austin is a 79 y.o. female with hypertension. Hypertension ROS: taking medications as instructed, no medication side effects noted, no TIA's, no chest pain on exertion, no dyspnea on exertion, no swelling of ankles. New concerns: Notes she was told by her cardiologist to not increase lisinopril. Situational stress  Improved that she has gone on a family trip. She loves to be engaged and appears to be happy when she is around others in her family. Past Medical History:   Diagnosis Date    Essential hypertension, benign     Fibromyalgia     Macular degeneration 2020     Achilles Panning    Myalgia and myositis, unspecified     Other and unspecified hyperlipidemia 10/12/2010    Palpitations 10/12/2010    Dr. Belkis Bales    Sleep apnea     Dr. 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       atorvastatin (LIPITOR) 20 mg tablet TAKE 1 TABLET BY MOUTH  DAILY 90 Tablet 0    busPIRone (BUSPAR) 5 mg tablet TAKE 1 TABLET BY MOUTH IN  THE MORNING AND MIDDAY AND  1 TO 2 TABLETS IN THE  EVENING FOR ANXIETY 360 Tablet 0    albuterol (PROVENTIL HFA, VENTOLIN HFA, PROAIR HFA) 90 mcg/actuation inhaler Take 1 Puff by inhalation every four (4) hours as needed for Wheezing. 1 Each 1    dicyclomine (BENTYL) 10 mg capsule Take 1 Capsule by mouth four (4) times daily. As needed. 30 Capsule 0    lisinopriL (PRINIVIL, ZESTRIL) 30 mg tablet TAKE 1 TABLET BY MOUTH  DAILY 90 Tablet 3    hydroCHLOROthiazide (HYDRODIURIL) 12.5 mg tablet Take 1 Tablet by mouth daily. 30 Tablet 0    cephALEXin (KEFLEX) 500 mg capsule Take 1 Capsule by mouth four (4) times daily. 40 Capsule 0    busPIRone (BUSPAR) 10 mg tablet Take 1 Tablet by mouth two (2) times a day. 180 Tablet 0    fexofenadine (ALLEGRA) 180 mg tablet Take 1 Tablet by mouth daily. 90 Tablet 1    varicella-zoster recombinant, PF, (Shingrix, PF,) 50 mcg/0.5 mL susr injection 0.5mL by IntraMUSCular route once now and then repeat in 2-6 months 0.5 mL 1    guaiFENesin ER (MUCINEX) 600 mg ER tablet Take 1 Tab by mouth two (2) times a day. Take with water 30 Tab 0    benzonatate (TESSALON) 100 mg capsule Take 100 mg by mouth three (3) times daily as needed for Cough. famotidine (PEPCID) 20 mg tablet Take 20 mg by mouth two (2) times daily as needed. vit C/E/Zn/coppr/lutein/zeaxan (PRESERVISION AREDS-2 PO) Take  by mouth. nebivolol (BYSTOLIC) 5 mg tablet Take 1 tablet by mouth daily. 30 tablet 0    cholecalciferol (VITAMIN D3) 25 mcg (1,000 unit) cap Take 2,000 Units by mouth.      triamcinolone acetonide (KENALOG) 0.1 % topical cream Apply  to affected area two (2) times a day. use thin layer 15 g 0    mupirocin (BACTROBAN) 2 % ointment Apply  to affected area daily. 22 g 0    diclofenac (VOLTAREN) 1 % gel Apply  to affected area four (4) times daily as needed.        Allergies   Allergen Reactions    Codeine Palpitations    Levofloxacin Other (comments)    Prednisone Palpitations    Vioxx [Rofecoxib] Palpitations       Review of Systems - General ROS: negative for - chills or fever  Cardiovascular ROS: no chest pain or dyspnea on exertion  Respiratory ROS: no cough, shortness of breath, or wheezing    Visit Vitals  BP (!) 165/78 (BP 1 Location: Left upper arm, BP Patient Position: Sitting, BP Cuff Size: Adult)   Pulse 61   Temp 97.9 °F (36.6 °C) (Oral)   Resp 15   Ht 5' (1.524 m)   Wt 134 lb 12.8 oz (61.1 kg)   SpO2 96%   BMI 26.33 kg/m²     Constitutional: [x] Appears well-developed and well-nourished [x] No apparent distress      [] Abnormal -     Mental status: [x] Alert and awake  [x] Oriented to person/place/time [x] Able to follow commands    [] Abnormal -     Eyes:   EOM    [x]  Normal    [] Abnormal -   Sclera  [x]  Normal    [] Abnormal -          Discharge [x]  None visible   [] Abnormal -     HENT: [x] Normocephalic, atraumatic  [] Abnormal -   [x] Mouth/Throat: Mucous membranes are moist    External Ears [x] Normal  [] Abnormal -    Neck: [x] No visualized mass [] Abnormal -     Pulmonary/Chest: [x] Respiratory effort normal   [x] No visualized signs of difficulty breathing or respiratory distress        [] Abnormal -      Musculoskeletal:   [x] Normal gait with no signs of ataxia         [x] Normal range of motion of neck        [] Abnormal -     Neurological:        [x] No Facial Asymmetry (Cranial nerve 7 motor function) (limited exam due to video visit)          [x] No gaze palsy        [] Abnormal -          Skin:        [x] No significant exanthematous lesions or discoloration noted on facial skin         [] Abnormal -            Psychiatric:       [x] Normal Affect [] Abnormal -        [x] No Hallucinations                                                                         This is the Subsequent Medicare Annual Wellness Exam, performed 12 months or more after the Initial AWV or the last Subsequent AWV    I have reviewed the patient's medical history in detail and updated the computerized patient record.        Assessment/Plan   Education and counseling provided:  Are appropriate based on today's review and evaluation  End-of-Life planning (with patient's consent)    1. Medicare annual wellness visit, subsequent  2. Essential hypertension  -     LIPID PANEL; Future  -     METABOLIC PANEL, COMPREHENSIVE; Future  3. PVC (premature ventricular contraction)  -     TSH 3RD GENERATION; Future  4. Elevated glucose  -     HEMOGLOBIN A1C WITH EAG; Future  5. Fatigue, unspecified type  -     TSH 3RD GENERATION; Future  -     CBC W/O DIFF; Future  6. Screen for colon cancer  7. Encounter for screening mammogram for malignant neoplasm of breast  -     NELA MAMMO BI SCREENING INCL CAD; Future  8. Postmenopausal state  -     DEXA BONE DENSITY STUDY AXIAL; Future  9. Screening for alcoholism  10. Screening for depression       Depression Risk Factor Screening     3 most recent PHQ Screens 8/23/2022   Little interest or pleasure in doing things Not at all   Feeling down, depressed, irritable, or hopeless Not at all   Total Score PHQ 2 0   Trouble falling or staying asleep, or sleeping too much -   Feeling tired or having little energy -   Poor appetite, weight loss, or overeating -   Feeling bad about yourself - or that you are a failure or have let yourself or your family down -   Trouble concentrating on things such as school, work, reading, or watching TV -   Moving or speaking so slowly that other people could have noticed; or the opposite being so fidgety that others notice -   Thoughts of being better off dead, or hurting yourself in some way -   PHQ 9 Score -       Alcohol & Drug Abuse Risk Screen   Do you average more than 1 drink per night or more than 7 drinks a week?: (P) No  On any one occasion in the past three months have you had more than 3 drinks containing alcohol?: (P) No          Functional Ability and Level of Safety   Hearing:  Hearing: (P) Patient wears hearing aid    Activities of Daily Living:   The home contains: (P) no safety equipment, rugs  Functional ADLs: (P) Patient does total self care   Ambulation:  Patient ambulates: (P) with no difficulty Fall Risk:  Fall Risk Assessment, last 12 mths 8/23/2022   Able to walk? Yes   Fall in past 12 months? 0   Do you feel unsteady? 0   Are you worried about falling 0   Number of falls in past 12 months -   Fall with injury?  -     Abuse Screen:  Do you ever feel afraid of your partner?: (P) No  Are you in a relationship with someone who physically or mentally threatens you?: (P) No  Is it safe for you to go home?: (P) Yes      Cognitive Screening   Has your family/caregiver stated any concerns about your memory?: (P) No   Cognitive Screening: Normal - Verbal Fluency Test    Health Maintenance Due     Health Maintenance Due   Topic Date Due    Lipid Screen  12/09/2021    COVID-19 Vaccine (4 - Booster for [place] series) 02/21/2022       Patient Care Team   Patient Care Team:  Milton Moreno MD as PCP - General (Internal Medicine Physician)  Milton Moreno MD as PCP - [facility] Empaneled Provider  Pranav Quinones MD (Cardio Vascular Surgery)    History     Patient Active Problem List   Diagnosis Code    Essential hypertension, benign I10    Other and unspecified hyperlipidemia E78.5    Palpitations R00.2    Obstructive sleep apnea (adult) (pediatric) G47.33    SOB (shortness of breath) R06.02    Myalgia and myositis, unspecified [id number]    Fibromyalgia M79.7    Sleep apnea G47.30    Advanced care planning/counseling discussion Z71.89     Past Medical History:   Diagnosis Date    Essential hypertension, benign     Fibromyalgia     Macular degeneration 2020    dr. Malachi Meza    Myalgia and myositis, unspecified     Other and unspecified hyperlipidemia 10/12/2010    Palpitations 10/12/2010    Dr. Samara Moreau    Sleep apnea     Dr. Memo Rinaldi, not taking cpap    SOB (shortness of breath)       Past Surgical History:   Procedure Laterality Date    HX HYSTERECTOMY      HX ORTHOPAEDIC      thumb and left foot     Current Outpatient Medications   Medication Sig Dispense Refill    mexiletine (MEXITIL) 150 mg capsule 150 mg two (2) times a day. atorvastatin (LIPITOR) 20 mg tablet TAKE 1 TABLET BY MOUTH  DAILY 90 Tablet 0    busPIRone (BUSPAR) 5 mg tablet TAKE 1 TABLET BY MOUTH IN  THE MORNING AND MIDDAY AND  1 TO 2 TABLETS IN THE  EVENING FOR ANXIETY 360 Tablet 0    albuterol (PROVENTIL HFA, VENTOLIN HFA, PROAIR HFA) 90 mcg/actuation inhaler Take 1 Puff by inhalation every four (4) hours as needed for Wheezing. 1 Each 1    dicyclomine (BENTYL) 10 mg capsule Take 1 Capsule by mouth four (4) times daily. As needed. 30 Capsule 0    lisinopriL (PRINIVIL, ZESTRIL) 30 mg tablet TAKE 1 TABLET BY MOUTH  DAILY 90 Tablet 3    hydroCHLOROthiazide (HYDRODIURIL) 12.5 mg tablet Take 1 Tablet by mouth daily. 30 Tablet 0    cephALEXin (KEFLEX) 500 mg capsule Take 1 Capsule by mouth four (4) times daily. 40 Capsule 0    busPIRone (BUSPAR) 10 mg tablet Take 1 Tablet by mouth two (2) times a day. 180 Tablet 0    fexofenadine (ALLEGRA) 180 mg tablet Take 1 Tablet by mouth daily. 90 Tablet 1    varicella-zoster recombinant, PF, (Shingrix, PF,) 50 mcg/0.5 mL susr injection 0.5mL by IntraMUSCular route once now and then repeat in 2-6 months 0.5 mL 1    guaiFENesin ER (MUCINEX) 600 mg ER tablet Take 1 Tab by mouth two (2) times a day. Take with water 30 Tab 0    benzonatate (TESSALON) 100 mg capsule Take 100 mg by mouth three (3) times daily as needed for Cough. famotidine (PEPCID) 20 mg tablet Take 20 mg by mouth two (2) times daily as needed. vit C/E/Zn/coppr/lutein/zeaxan (PRESERVISION AREDS-2 PO) Take  by mouth. nebivolol (BYSTOLIC) 5 mg tablet Take 1 tablet by mouth daily. 30 tablet 0    cholecalciferol (VITAMIN D3) 25 mcg (1,000 unit) cap Take 2,000 Units by mouth.        Allergies   Allergen Reactions    Codeine Palpitations    Levofloxacin Other (comments)    Prednisone Palpitations    Vioxx [Rofecoxib] Palpitations       Family History   Problem Relation Age of Onset    Diabetes Mother 61    Cancer Maternal Grandmother colon ca    Diabetes Maternal Grandmother 61    Cancer Paternal Grandmother         intestinal ca    Heart Attack Paternal Grandmother     Stroke Paternal Grandmother     Heart Attack Paternal Grandfather     Stroke Paternal Grandfather      Social History     Tobacco Use    Smoking status: Never    Smokeless tobacco: Never    Tobacco comments:     passive smoke with    Substance Use Topics    Alcohol use: Yes     Comment: thierry Guillen MD       This medical record was transcribed using an electronic medical records/speech recognition system. Although proofread, it may and can contain electronic, spelling and other errors. Corrections may be executed at a later time. Please contact us for any clarifications as needed. Aside from his Medicare visit on this date 08/23/22  I have spent 30  minutes reviewing previous notes, test results and face to face with the patient discussing the diagnosis and importance of compliance with the treatment plan as well as documenting on the day of the visit.

## 2022-09-07 RX ORDER — HYDROCHLOROTHIAZIDE 12.5 MG/1
12.5 TABLET ORAL DAILY
Qty: 90 TABLET | Refills: 3 | Status: SHIPPED | OUTPATIENT
Start: 2022-09-07

## 2022-09-19 DIAGNOSIS — K92.89 GAS BLOAT SYNDROME: ICD-10-CM

## 2022-09-19 RX ORDER — DICYCLOMINE HYDROCHLORIDE 10 MG/1
10 CAPSULE ORAL 4 TIMES DAILY
Qty: 30 CAPSULE | Refills: 0 | Status: SHIPPED | OUTPATIENT
Start: 2022-09-19

## 2022-09-28 DIAGNOSIS — F43.9 SITUATIONAL STRESS: ICD-10-CM

## 2022-09-28 RX ORDER — BUSPIRONE HYDROCHLORIDE 5 MG/1
TABLET ORAL
Qty: 360 TABLET | Refills: 0 | Status: SHIPPED | OUTPATIENT
Start: 2022-09-28

## 2022-09-28 RX ORDER — ATORVASTATIN CALCIUM 20 MG/1
20 TABLET, FILM COATED ORAL DAILY
Qty: 90 TABLET | Refills: 3 | Status: SHIPPED | OUTPATIENT
Start: 2022-09-28

## 2022-11-01 ENCOUNTER — HOSPITAL ENCOUNTER (OUTPATIENT)
Dept: CARDIAC REHAB | Age: 82
Discharge: HOME | End: 2022-11-01
Payer: MEDICARE

## 2022-11-01 VITALS — WEIGHT: 139.6 LBS | BODY MASS INDEX: 27.41 KG/M2 | HEIGHT: 60 IN

## 2022-11-01 PROCEDURE — 93798 PHYS/QHP OP CAR RHAB W/ECG: CPT

## 2022-11-01 NOTE — CARDIO/PULMONARY
[facility] Cardiopulmonary Rehab Orientation:    Met with Emily Arizmendi, :38, for cardiac rehab orientation and exercise tolerance test today. Mrs. Hildegard Babinski is a 79year-old patient of Dr Marva Gaspar, with diagnosis of CAD. She denies previous cardiac history. LVEF 60% by echo 22. Cardiac risk factors include:  HTN, HLD, age, post-menopausal and family hx. BMI 27. Pt has never smoked cigarettes. Pt lives alone, her   6 yrs ago. She is retired. She has three children, two who live nearby. Depression score PHQ9 is 6. Patient denied chest pain or SOB during 6 minute exercise tolerance test on treadmill at 1.8 mph, with peak HR 82, peak /94, and RPE 11. Cardiac rhythm was ventricular bigeminy and trigeminy at rest with frequent mf pvc's and occasional couplets during exercise. Limitations to exercise are primarily related to concern about arrhythmia. Pt has not been exercising at home but walks her dogs every day. Pt was given the Cardiac Rehab manual and an exercise plan was developed. Pt will attend cardiac rehab 2-3 times per week.

## 2022-11-04 ENCOUNTER — HOSPITAL ENCOUNTER (OUTPATIENT)
Dept: CARDIAC REHAB | Age: 82
Discharge: HOME | End: 2022-11-04
Payer: MEDICARE

## 2022-11-04 ENCOUNTER — PATIENT MESSAGE (OUTPATIENT)
Dept: INTERNAL MEDICINE CLINIC | Age: 82
End: 2022-11-04

## 2022-11-04 VITALS — BODY MASS INDEX: 27.28 KG/M2 | WEIGHT: 139.7 LBS

## 2022-11-04 PROCEDURE — 93798 PHYS/QHP OP CAR RHAB W/ECG: CPT

## 2022-11-07 ENCOUNTER — HOSPITAL ENCOUNTER (OUTPATIENT)
Dept: CARDIAC REHAB | Age: 82
Discharge: HOME | End: 2022-11-07
Payer: MEDICARE

## 2022-11-07 VITALS — WEIGHT: 140.8 LBS | BODY MASS INDEX: 27.5 KG/M2

## 2022-11-07 PROCEDURE — 93798 PHYS/QHP OP CAR RHAB W/ECG: CPT

## 2022-11-09 ENCOUNTER — HOSPITAL ENCOUNTER (OUTPATIENT)
Dept: CARDIAC REHAB | Age: 82
Discharge: HOME | End: 2022-11-09
Payer: MEDICARE

## 2022-11-09 VITALS — BODY MASS INDEX: 27.38 KG/M2 | WEIGHT: 140.2 LBS

## 2022-11-09 PROCEDURE — 93798 PHYS/QHP OP CAR RHAB W/ECG: CPT

## 2022-11-10 DIAGNOSIS — F43.9 SITUATIONAL STRESS: ICD-10-CM

## 2022-11-10 RX ORDER — BUSPIRONE HYDROCHLORIDE 5 MG/1
TABLET ORAL
Qty: 360 TABLET | Refills: 0 | Status: SHIPPED | OUTPATIENT
Start: 2022-11-10

## 2022-11-10 RX ORDER — HYDROCHLOROTHIAZIDE 12.5 MG/1
12.5 TABLET ORAL DAILY
Qty: 90 TABLET | Refills: 3 | Status: SHIPPED | OUTPATIENT
Start: 2022-11-10

## 2022-11-11 ENCOUNTER — APPOINTMENT (OUTPATIENT)
Dept: CARDIAC REHAB | Age: 82
End: 2022-11-11
Payer: MEDICARE

## 2022-11-16 ENCOUNTER — HOSPITAL ENCOUNTER (OUTPATIENT)
Dept: CARDIAC REHAB | Age: 82
Discharge: HOME | End: 2022-11-16
Payer: MEDICARE

## 2022-11-16 VITALS — WEIGHT: 139.6 LBS | BODY MASS INDEX: 27.26 KG/M2

## 2022-11-16 PROCEDURE — 93798 PHYS/QHP OP CAR RHAB W/ECG: CPT

## 2022-11-18 ENCOUNTER — HOSPITAL ENCOUNTER (OUTPATIENT)
Dept: CARDIAC REHAB | Age: 82
Discharge: HOME | End: 2022-11-18
Payer: MEDICARE

## 2022-11-18 VITALS — BODY MASS INDEX: 27.58 KG/M2 | WEIGHT: 141.2 LBS

## 2022-11-18 PROCEDURE — 93798 PHYS/QHP OP CAR RHAB W/ECG: CPT

## 2022-11-21 ENCOUNTER — HOSPITAL ENCOUNTER (OUTPATIENT)
Dept: CARDIAC REHAB | Age: 82
Discharge: HOME | End: 2022-11-21
Payer: MEDICARE

## 2022-11-21 VITALS — BODY MASS INDEX: 27.46 KG/M2 | WEIGHT: 140.6 LBS

## 2022-11-21 PROCEDURE — 93798 PHYS/QHP OP CAR RHAB W/ECG: CPT

## 2022-11-23 ENCOUNTER — HOSPITAL ENCOUNTER (OUTPATIENT)
Dept: CARDIAC REHAB | Age: 82
Discharge: HOME | End: 2022-11-23
Payer: MEDICARE

## 2022-11-23 VITALS — WEIGHT: 140.8 LBS | BODY MASS INDEX: 27.5 KG/M2

## 2022-11-23 PROCEDURE — 93798 PHYS/QHP OP CAR RHAB W/ECG: CPT

## 2022-11-28 ENCOUNTER — HOSPITAL ENCOUNTER (OUTPATIENT)
Dept: CARDIAC REHAB | Age: 82
Discharge: HOME | End: 2022-11-28
Payer: MEDICARE

## 2022-11-28 VITALS — BODY MASS INDEX: 27.38 KG/M2 | WEIGHT: 140.2 LBS

## 2022-11-28 PROCEDURE — 93798 PHYS/QHP OP CAR RHAB W/ECG: CPT

## 2022-11-30 ENCOUNTER — HOSPITAL ENCOUNTER (OUTPATIENT)
Dept: CARDIAC REHAB | Age: 82
Discharge: HOME | End: 2022-11-30
Payer: MEDICARE

## 2022-11-30 VITALS — BODY MASS INDEX: 27.38 KG/M2 | WEIGHT: 140.2 LBS

## 2022-11-30 PROCEDURE — 93798 PHYS/QHP OP CAR RHAB W/ECG: CPT

## 2022-12-02 ENCOUNTER — APPOINTMENT (OUTPATIENT)
Dept: CARDIAC REHAB | Age: 82
End: 2022-12-02
Payer: MEDICARE

## 2022-12-05 ENCOUNTER — HOSPITAL ENCOUNTER (OUTPATIENT)
Dept: CARDIAC REHAB | Age: 82
Discharge: HOME | End: 2022-12-05
Payer: MEDICARE

## 2022-12-05 VITALS — WEIGHT: 139.8 LBS | BODY MASS INDEX: 27.3 KG/M2

## 2022-12-05 PROCEDURE — 93798 PHYS/QHP OP CAR RHAB W/ECG: CPT

## 2022-12-07 ENCOUNTER — APPOINTMENT (OUTPATIENT)
Dept: CARDIAC REHAB | Age: 82
End: 2022-12-07
Payer: MEDICARE

## 2022-12-09 ENCOUNTER — HOSPITAL ENCOUNTER (OUTPATIENT)
Dept: CARDIAC REHAB | Age: 82
Discharge: HOME | End: 2022-12-09
Payer: MEDICARE

## 2022-12-09 VITALS — WEIGHT: 140.2 LBS | BODY MASS INDEX: 27.38 KG/M2

## 2022-12-09 PROCEDURE — 93798 PHYS/QHP OP CAR RHAB W/ECG: CPT

## 2022-12-12 ENCOUNTER — APPOINTMENT (OUTPATIENT)
Dept: CARDIAC REHAB | Age: 82
End: 2022-12-12
Payer: MEDICARE

## 2023-01-04 ENCOUNTER — HOSPITAL ENCOUNTER (OUTPATIENT)
Dept: CARDIAC REHAB | Age: 83
Discharge: HOME | End: 2023-01-04
Payer: MEDICARE

## 2023-01-04 VITALS — BODY MASS INDEX: 27.65 KG/M2 | WEIGHT: 141.6 LBS

## 2023-01-04 PROCEDURE — 93798 PHYS/QHP OP CAR RHAB W/ECG: CPT

## 2023-01-06 ENCOUNTER — HOSPITAL ENCOUNTER (OUTPATIENT)
Dept: CARDIAC REHAB | Age: 83
Discharge: HOME | End: 2023-01-06
Payer: MEDICARE

## 2023-01-06 VITALS — BODY MASS INDEX: 28.16 KG/M2 | WEIGHT: 144.2 LBS

## 2023-01-06 PROCEDURE — 93798 PHYS/QHP OP CAR RHAB W/ECG: CPT

## 2023-01-09 ENCOUNTER — HOSPITAL ENCOUNTER (OUTPATIENT)
Dept: CARDIAC REHAB | Age: 83
Discharge: HOME | End: 2023-01-09
Payer: MEDICARE

## 2023-01-09 VITALS — WEIGHT: 142.4 LBS | BODY MASS INDEX: 27.81 KG/M2

## 2023-01-09 PROCEDURE — 93798 PHYS/QHP OP CAR RHAB W/ECG: CPT

## 2023-01-11 ENCOUNTER — HOSPITAL ENCOUNTER (OUTPATIENT)
Dept: CARDIAC REHAB | Age: 83
Discharge: HOME | End: 2023-01-11
Payer: MEDICARE

## 2023-01-11 VITALS — BODY MASS INDEX: 27.65 KG/M2 | WEIGHT: 141.6 LBS

## 2023-01-11 PROCEDURE — 93798 PHYS/QHP OP CAR RHAB W/ECG: CPT

## 2023-01-13 ENCOUNTER — HOSPITAL ENCOUNTER (OUTPATIENT)
Dept: CARDIAC REHAB | Age: 83
Discharge: HOME | End: 2023-01-13
Payer: MEDICARE

## 2023-01-13 VITALS — WEIGHT: 140.6 LBS | BODY MASS INDEX: 27.46 KG/M2

## 2023-01-13 PROCEDURE — 93798 PHYS/QHP OP CAR RHAB W/ECG: CPT

## 2023-01-16 ENCOUNTER — HOSPITAL ENCOUNTER (OUTPATIENT)
Dept: CARDIAC REHAB | Age: 83
Discharge: HOME | End: 2023-01-16
Payer: MEDICARE

## 2023-01-16 VITALS — WEIGHT: 140.2 LBS | BODY MASS INDEX: 27.38 KG/M2

## 2023-01-16 PROCEDURE — 93798 PHYS/QHP OP CAR RHAB W/ECG: CPT

## 2023-01-18 ENCOUNTER — HOSPITAL ENCOUNTER (OUTPATIENT)
Dept: CARDIAC REHAB | Age: 83
Discharge: HOME | End: 2023-01-18
Payer: MEDICARE

## 2023-01-18 VITALS — BODY MASS INDEX: 27.69 KG/M2 | WEIGHT: 141.8 LBS

## 2023-01-18 DIAGNOSIS — I10 ESSENTIAL HYPERTENSION: ICD-10-CM

## 2023-01-18 PROCEDURE — 93798 PHYS/QHP OP CAR RHAB W/ECG: CPT

## 2023-01-19 RX ORDER — LISINOPRIL 30 MG/1
TABLET ORAL
Qty: 90 TABLET | Refills: 3 | Status: SHIPPED | OUTPATIENT
Start: 2023-01-19

## 2023-01-20 ENCOUNTER — APPOINTMENT (OUTPATIENT)
Dept: CARDIAC REHAB | Age: 83
End: 2023-01-20
Payer: MEDICARE

## 2023-01-20 ENCOUNTER — HOSPITAL ENCOUNTER (OUTPATIENT)
Dept: CARDIAC REHAB | Age: 83
Discharge: HOME | End: 2023-01-20
Payer: MEDICARE

## 2023-01-20 VITALS — BODY MASS INDEX: 27.56 KG/M2 | WEIGHT: 141.1 LBS

## 2023-01-20 PROCEDURE — 93798 PHYS/QHP OP CAR RHAB W/ECG: CPT

## 2023-01-23 ENCOUNTER — HOSPITAL ENCOUNTER (OUTPATIENT)
Dept: CARDIAC REHAB | Age: 83
Discharge: HOME | End: 2023-01-23
Payer: MEDICARE

## 2023-01-23 VITALS — BODY MASS INDEX: 27.54 KG/M2 | WEIGHT: 141 LBS

## 2023-01-23 PROCEDURE — 93798 PHYS/QHP OP CAR RHAB W/ECG: CPT

## 2023-01-25 ENCOUNTER — HOSPITAL ENCOUNTER (OUTPATIENT)
Dept: CARDIAC REHAB | Age: 83
Discharge: HOME | End: 2023-01-25
Payer: MEDICARE

## 2023-01-25 VITALS — WEIGHT: 141 LBS | BODY MASS INDEX: 27.54 KG/M2

## 2023-01-25 PROCEDURE — 93798 PHYS/QHP OP CAR RHAB W/ECG: CPT

## 2023-01-27 ENCOUNTER — APPOINTMENT (OUTPATIENT)
Dept: CARDIAC REHAB | Age: 83
End: 2023-01-27
Payer: MEDICARE

## 2023-02-01 ENCOUNTER — HOSPITAL ENCOUNTER (OUTPATIENT)
Dept: CARDIAC REHAB | Age: 83
Discharge: HOME | End: 2023-02-01
Payer: MEDICARE

## 2023-02-01 VITALS — WEIGHT: 141.4 LBS | BODY MASS INDEX: 27.62 KG/M2

## 2023-02-01 PROCEDURE — 93798 PHYS/QHP OP CAR RHAB W/ECG: CPT

## 2023-02-06 ENCOUNTER — HOSPITAL ENCOUNTER (OUTPATIENT)
Dept: CARDIAC REHAB | Age: 83
Discharge: HOME | End: 2023-02-06
Payer: MEDICARE

## 2023-02-06 VITALS — WEIGHT: 141 LBS | BODY MASS INDEX: 27.54 KG/M2

## 2023-02-06 PROCEDURE — 93798 PHYS/QHP OP CAR RHAB W/ECG: CPT

## 2023-02-08 ENCOUNTER — HOSPITAL ENCOUNTER (OUTPATIENT)
Dept: CARDIAC REHAB | Age: 83
Discharge: HOME | End: 2023-02-08
Payer: MEDICARE

## 2023-02-08 VITALS — WEIGHT: 140.8 LBS | BODY MASS INDEX: 27.5 KG/M2

## 2023-02-08 PROCEDURE — 93798 PHYS/QHP OP CAR RHAB W/ECG: CPT

## 2023-02-10 ENCOUNTER — HOSPITAL ENCOUNTER (OUTPATIENT)
Dept: CARDIAC REHAB | Age: 83
Discharge: HOME | End: 2023-02-10
Payer: MEDICARE

## 2023-02-10 VITALS — BODY MASS INDEX: 27.54 KG/M2 | WEIGHT: 141 LBS

## 2023-02-10 PROCEDURE — 93798 PHYS/QHP OP CAR RHAB W/ECG: CPT

## 2023-02-13 ENCOUNTER — HOSPITAL ENCOUNTER (OUTPATIENT)
Dept: CARDIAC REHAB | Age: 83
Discharge: HOME | End: 2023-02-13
Payer: MEDICARE

## 2023-02-13 VITALS — BODY MASS INDEX: 27.58 KG/M2 | WEIGHT: 141.2 LBS

## 2023-02-13 PROCEDURE — 93798 PHYS/QHP OP CAR RHAB W/ECG: CPT

## 2023-02-15 ENCOUNTER — HOSPITAL ENCOUNTER (OUTPATIENT)
Dept: CARDIAC REHAB | Age: 83
Discharge: HOME | End: 2023-02-15
Payer: MEDICARE

## 2023-02-15 VITALS — WEIGHT: 142.5 LBS | BODY MASS INDEX: 27.83 KG/M2

## 2023-02-15 PROCEDURE — 93798 PHYS/QHP OP CAR RHAB W/ECG: CPT

## 2023-02-17 ENCOUNTER — HOSPITAL ENCOUNTER (OUTPATIENT)
Dept: CARDIAC REHAB | Age: 83
Discharge: HOME | End: 2023-02-17
Payer: MEDICARE

## 2023-02-17 VITALS — WEIGHT: 141.8 LBS | BODY MASS INDEX: 27.69 KG/M2

## 2023-02-17 PROCEDURE — 93798 PHYS/QHP OP CAR RHAB W/ECG: CPT

## 2023-02-20 ENCOUNTER — HOSPITAL ENCOUNTER (OUTPATIENT)
Dept: CARDIAC REHAB | Age: 83
Discharge: HOME | End: 2023-02-20
Payer: MEDICARE

## 2023-02-20 VITALS — WEIGHT: 142.4 LBS | BODY MASS INDEX: 27.81 KG/M2

## 2023-02-20 PROCEDURE — 93798 PHYS/QHP OP CAR RHAB W/ECG: CPT

## 2023-02-22 ENCOUNTER — HOSPITAL ENCOUNTER (OUTPATIENT)
Dept: CARDIAC REHAB | Age: 83
Discharge: HOME | End: 2023-02-22
Payer: MEDICARE

## 2023-02-22 VITALS — WEIGHT: 142.3 LBS | BODY MASS INDEX: 27.79 KG/M2

## 2023-02-22 PROCEDURE — 93798 PHYS/QHP OP CAR RHAB W/ECG: CPT

## 2023-02-24 ENCOUNTER — HOSPITAL ENCOUNTER (OUTPATIENT)
Dept: CARDIAC REHAB | Age: 83
Discharge: HOME | End: 2023-02-24
Payer: MEDICARE

## 2023-02-24 VITALS — WEIGHT: 143 LBS | BODY MASS INDEX: 27.93 KG/M2

## 2023-02-24 PROCEDURE — 93798 PHYS/QHP OP CAR RHAB W/ECG: CPT

## 2023-02-27 ENCOUNTER — HOSPITAL ENCOUNTER (OUTPATIENT)
Dept: CARDIAC REHAB | Age: 83
Discharge: HOME | End: 2023-02-27
Payer: MEDICARE

## 2023-02-27 VITALS — BODY MASS INDEX: 27.65 KG/M2 | WEIGHT: 141.6 LBS

## 2023-02-27 PROCEDURE — 93798 PHYS/QHP OP CAR RHAB W/ECG: CPT

## 2023-02-27 NOTE — TELEPHONE ENCOUNTER
From: Bhavya May  To: Gena Sanchez MD  Sent: 11/4/2022 5:02 PM EDT  Subject: update immunizations    update immunizations: I had 2nd covid booster in April 2022 and the third one in October 2022. I had my flu shot October 2022. Gestational diabetes

## 2023-03-01 ENCOUNTER — HOSPITAL ENCOUNTER (OUTPATIENT)
Dept: CARDIAC REHAB | Age: 83
Discharge: HOME | End: 2023-03-01
Payer: MEDICARE

## 2023-03-01 VITALS — BODY MASS INDEX: 27.93 KG/M2 | WEIGHT: 143 LBS

## 2023-03-01 PROCEDURE — 93798 PHYS/QHP OP CAR RHAB W/ECG: CPT

## 2023-03-02 ENCOUNTER — OFFICE VISIT (OUTPATIENT)
Dept: INTERNAL MEDICINE CLINIC | Age: 83
End: 2023-03-02

## 2023-03-02 VITALS
RESPIRATION RATE: 18 BRPM | OXYGEN SATURATION: 94 % | HEIGHT: 60 IN | SYSTOLIC BLOOD PRESSURE: 137 MMHG | HEART RATE: 57 BPM | TEMPERATURE: 97.7 F | DIASTOLIC BLOOD PRESSURE: 71 MMHG | WEIGHT: 143.8 LBS | BODY MASS INDEX: 28.23 KG/M2

## 2023-03-02 DIAGNOSIS — I10 ESSENTIAL HYPERTENSION: ICD-10-CM

## 2023-03-02 DIAGNOSIS — G89.29 CHRONIC PAIN OF RIGHT ANKLE: Primary | ICD-10-CM

## 2023-03-02 DIAGNOSIS — M25.571 CHRONIC PAIN OF RIGHT ANKLE: Primary | ICD-10-CM

## 2023-03-02 DIAGNOSIS — F51.01 PRIMARY INSOMNIA: ICD-10-CM

## 2023-03-02 RX ORDER — DOXEPIN HYDROCHLORIDE 3 MG/1
TABLET ORAL
Qty: 30 TABLET | Refills: 1 | Status: SHIPPED | OUTPATIENT
Start: 2023-03-02

## 2023-03-06 ENCOUNTER — HOSPITAL ENCOUNTER (OUTPATIENT)
Dept: CARDIAC REHAB | Age: 83
Discharge: HOME | End: 2023-03-06
Payer: SELF-PAY

## 2023-03-06 VITALS — BODY MASS INDEX: 27.85 KG/M2 | WEIGHT: 142.6 LBS

## 2023-03-06 PROCEDURE — 93798 PHYS/QHP OP CAR RHAB W/ECG: CPT

## 2023-03-08 ENCOUNTER — HOSPITAL ENCOUNTER (OUTPATIENT)
Dept: CARDIAC REHAB | Age: 83
Discharge: HOME | End: 2023-03-08
Payer: SELF-PAY

## 2023-03-08 VITALS — WEIGHT: 143 LBS | BODY MASS INDEX: 27.93 KG/M2

## 2023-03-08 PROCEDURE — 93798 PHYS/QHP OP CAR RHAB W/ECG: CPT

## 2023-03-10 ENCOUNTER — OFFICE VISIT (OUTPATIENT)
Dept: ORTHOPEDIC SURGERY | Age: 83
End: 2023-03-10

## 2023-03-10 VITALS — WEIGHT: 143 LBS | HEIGHT: 60 IN | BODY MASS INDEX: 28.07 KG/M2

## 2023-03-10 DIAGNOSIS — M79.671 RIGHT FOOT PAIN: ICD-10-CM

## 2023-03-10 DIAGNOSIS — M76.821 POSTERIOR TIBIAL TENDON DYSFUNCTION (PTTD) OF RIGHT LOWER EXTREMITY: Primary | ICD-10-CM

## 2023-03-10 DIAGNOSIS — G89.29 CHRONIC PAIN OF RIGHT ANKLE: ICD-10-CM

## 2023-03-10 DIAGNOSIS — M25.571 CHRONIC PAIN OF RIGHT ANKLE: ICD-10-CM

## 2023-03-10 RX ORDER — MELOXICAM 7.5 MG/1
7.5 TABLET ORAL DAILY
Qty: 20 TABLET | Refills: 1 | Status: SHIPPED | OUTPATIENT
Start: 2023-03-10

## 2023-03-10 NOTE — PROGRESS NOTES
Daisy Coffman (: 1940) is a 79 y.o. female, patient,here for evaluation of the following   Chief Complaint   Patient presents with    Ankle Pain        ASSESSMENT/PLAN:  Below is the assessment and plan developed based on review of pertinent history, physical exam, labs, studies, and medications. 1. Posterior tibial tendon dysfunction (PTTD) of right lower extremity  -     [place] NON-PNEUM WALK BOOT PRE OTS  -     meloxicam (MOBIC) 7.5 mg tablet; Take 1 Tablet by mouth daily. , Normal, Disp-20 Tablet, R-1  -     REFERRAL TO DME  -     REFERRAL TO PHYSICAL THERAPY  2. Chronic pain of right ankle  -     XR STANDING ANKLE RT MIN 3 V; Future  -     [place] NON-PNEUM WALK BOOT PRE OTS  -     meloxicam (MOBIC) 7.5 mg tablet; Take 1 Tablet by mouth daily. , Normal, Disp-20 Tablet, R-1  -     REFERRAL TO DME  -     REFERRAL TO PHYSICAL THERAPY  3. Right foot pain  -     XR STANDING FOOT RT MIN 3 V; Future  -     [place] NON-PNEUM WALK BOOT PRE OTS  -     meloxicam (MOBIC) 7.5 mg tablet; Take 1 Tablet by mouth daily. , Normal, Disp-20 Tablet, R-1  -     REFERRAL TO DME  -     REFERRAL TO PHYSICAL THERAPY    Patient verbalized understanding of exam findings and treatment plan. We engaged in the shared decision-making process and treatment options were discussed at length with the patient. Surgical and conservative management discussed today along with risk and benefits. Patient is informed of findings on exam and x-rays reviewed, most of her pain is along the posterior tibial tendon, there may be weakness, degeneration or possibly tear that is resulting in the current pain. She does have a slight valgus position to the ankle joint but does not have significant pes planovalgus foot position as she has a normal coverage of the talar head and Meary's angle is mostly normal on weightbearing stance of the foot x-rays.   We discussed the treatment for this problem, use of a brace may be helpful in terms of temporary relief of symptoms, I recommended physical therapy program and once a day anti-inflammatory to take as needed but temporarily. She well continue with Tylenol as needed. She is 79years of age, surgery may not be the best option for reconstructive procedure, so we can try to see if we can relieve the symptoms with the conservative treatment recommended. She is provided a referral to physical therapy. She is also very tender at the sinus tarsi, we could consider a cortisone injection into that area next time if she elects to do so. I would not recommend injecting the posterior tibial tendon. She will return if she elects to proceed with a cortisone injection. I discussed management options with the patient. All questions were answered to the best of my ability and to the patient's satisfaction. I discussed their history, symptoms, physical exam findings, diagnostic testing results, diagnosis and treatment options. The patient verbalized understanding and elected to proceed with conservative treatment as above. Return in about 4 weeks (around 4/7/2023) for cortizone injection to consider into sinus tarsi if patient elects to do so. Allergies   Allergen Reactions    Codeine Palpitations    Levofloxacin Other (comments)    Prednisone Palpitations    Vioxx [Rofecoxib] Palpitations       Current Outpatient Medications   Medication Sig    meloxicam (MOBIC) 7.5 mg tablet Take 1 Tablet by mouth daily.     Doxepin 3 mg tab Take 1/2 hour prior to bed    lisinopriL (PRINIVIL, ZESTRIL) 30 mg tablet TAKE 1 TABLET BY MOUTH  DAILY    hydroCHLOROthiazide (HYDRODIURIL) 12.5 mg tablet TAKE 1 TABLET BY MOUTH  DAILY (Patient taking differently: Take 25 mg by mouth daily.)    busPIRone (BUSPAR) 5 mg tablet TAKE 1 TABLET BY MOUTH IN  THE MORNING AND MIDDAY AND  1 TO 2 TABLETS BY MOUTH IN  THE EVENING FOR ANXIETY    atorvastatin (LIPITOR) 20 mg tablet TAKE 1 TABLET BY MOUTH  DAILY    dicyclomine (BENTYL) 10 mg capsule Take 1 Capsule by mouth four (4) times daily. As needed. mexiletine (MEXITIL) 150 mg capsule 150 mg two (2) times a day. albuterol (PROVENTIL HFA, VENTOLIN HFA, PROAIR HFA) 90 mcg/actuation inhaler Take 1 Puff by inhalation every four (4) hours as needed for Wheezing. fexofenadine (ALLEGRA) 180 mg tablet Take 1 Tablet by mouth daily. varicella-zoster recombinant, PF, (Shingrix, PF,) 50 mcg/0.5 mL susr injection 0.5mL by IntraMUSCular route once now and then repeat in 2-6 months (Patient not taking: No sig reported)    guaiFENesin ER (MUCINEX) 600 mg ER tablet Take 1 Tab by mouth two (2) times a day. Take with water    benzonatate (TESSALON) 100 mg capsule Take 100 mg by mouth three (3) times daily as needed for Cough. (Patient not taking: No sig reported)    famotidine (PEPCID) 20 mg tablet Take 20 mg by mouth two (2) times daily as needed. vit C/E/Zn/coppr/lutein/zeaxan (PRESERVISION AREDS-2 PO) Take  by mouth. (Patient not taking: Reported on 3/2/2023)    nebivolol (BYSTOLIC) 5 mg tablet Take 1 tablet by mouth daily. (Patient taking differently: Take 10 mg by mouth daily.)    cholecalciferol (VITAMIN D3) 25 mcg (1,000 unit) cap Take 2,000 Units by mouth. No current facility-administered medications for this visit.        Past Medical History:   Diagnosis Date    Arrhythmia     Arthritis     CAD (coronary artery disease)     Essential hypertension, benign     Fibromyalgia     Macular degeneration 2020    dr. Jamia Wolfe    Myalgia and myositis, unspecified     Other and unspecified hyperlipidemia 10/12/2010    Palpitations 10/12/2010    Dr. Eula Reis    Sleep apnea     Dr. Zachery Mayberry, not taking cpap    SOB (shortness of breath)        Past Surgical History:   Procedure Laterality Date    HX HYSTERECTOMY      HX ORTHOPAEDIC      thumb and left foot       Family History   Problem Relation Age of Onset    Diabetes Mother 61    Cancer Maternal Grandmother         colon ca    Diabetes Maternal Grandmother 60    Cancer Paternal Grandmother         intestinal ca    Heart Attack Paternal Grandmother     Stroke Paternal Grandmother     Heart Attack Paternal Grandfather     Stroke Paternal Grandfather        Social History     Socioeconomic History    Marital status:      Spouse name: Not on file    Number of children: 3    Years of education: 14    Highest education level: Some college, no degree   Occupational History    Not on file   Tobacco Use    Smoking status: Never    Smokeless tobacco: Never    Tobacco comments:     passive smoke with    Substance and Sexual Activity    Alcohol use: Yes     Comment: rare    Drug use: No    Sexual activity: Never     Comment:  for 47 years   Other Topics Concern     Service No    Blood Transfusions Not Asked    Caffeine Concern Not Asked    Occupational Exposure Not Asked    Hobby Hazards Not Asked    Sleep Concern Not Asked    Stress Concern Not Asked    Weight Concern Not Asked    Special Diet Not Asked    Back Care Not Asked    Exercise Not Asked    Bike Helmet Not Asked    Seat Belt Not Asked    Self-Exams Not Asked   Social History Narrative        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             SUBJECTIVE:  Fer Luis (: 1940)   New patient presents today with complaint of right ankle pain for the past several months but is a chronic problem, the pain can be severe to extremely severe sharp stabbing pain that is constant interferes with sleep. There is swelling. Standing, twisting, squatting, kneeling, walking make it worse. Symptoms getting worse despite rest and elevation taking Tylenol arthritis formula and naproxen. She has not seen other physicians or providers for this problem. She is not diabetic, states non-smoker, passive smoke with  who is a smoker. OBJECTIVE EXAM:     Visit Vitals  Ht 5' (1.524 m)   Wt 143 lb (64.9 kg)   BMI 27.93 kg/m²       Appearance: Alert, well appearing and pleasant patient who is in no distress, oriented to person, place/time, and who follows commands. This patient is accompanied in the       office by her  self. Psychiatric: Affect and mood are appropriate. No dementia noted on examination  Musculoskeletal:  LOCATION: Diffuse tenderness medial ankle and hindfoot foot - right      Integumentary: No rashes, skin patches, wounds, or abrasions to the right or left legs       Warm and normal color. No regions of expressible drainage. Gait: Normal      Tenderness: No tenderness        Motor/Strength/Tone Exam: Normal       Sensory Exam:   Intact Normal Sensation to ankle/foot      Stability Testing: No anterolateral or varus instability of the Ankle or Subtalar Joints               No peroneal tendon instability noted      ROM: Normal ROM noted to ankle/foot      Contractures: No Achilles or Gastrocnemius Contractures      Calf tenderness: Absent for calf or gastrocnemius muscle regions       Soft, supple, non tender, non taut lower extremity compartment  Alignment:      NEUTRAL Hindfoot,         none Metatarsus Adductus Metatarsus   Wounds/Abrasions:    None present  Extremities:   No embolic phenomena to the toes          No significant edema to the foot and or toes.         Lower extremities are warm and appear well perfused    DVT: No evidence of DVT seen on examination at this time     No calf swelling, no tenderness to calf muscles  Lymphatic:  No Evidence of Lymphedema  Vascular: Medial Border of Tibia Region: Edema is not present         Pulses: Dorsalis Pedis &  Posterior Tibial Pulses : Palpable yes        Varicosities Lower Limbs :  None  noted  Neuro: Negative bilateral Straight leg raise (seated position)    See Musculoskeletal section for pertinent individual extremity examination    No abnormal hand/wrist, foot/ankle, or facial/neck tremors. Lower Extremity/Ankle/Foot:  Antalgic gait, pes planovalgus pronated foot position during weightbearing stance. Right lower extremity/ankle: Valgus position to the ankle when weightbearing, tender along the posterior tibial tendon, there is tightness to the Achilles tendon on attempted dorsiflexion when hindfoot held in neutral position knee extended. Sinus tarsi is not severely tender, ankle joint nontender, ligaments grossly stable, minimal tenderness at the fibular abutment. Achilles tendon otherwise intact with negative Jose test, negative ankle squeeze test.    Right foot: Pronated foot without significant loss of arch, tender along the medial hindfoot in line with the posterior tibial tendon and sinus tarsi. The midfoot joints minimally tender, forefoot nontender, toes nontender with full active and passive range of motion intact, toes well perfused. There is moderate hallux valgus mostly nontender and lesser toe deformities. Contralateral lower extremity: Skin intact without erythema or wounds. 2+ dorsalis pedis pulse. Toes are warm, and well-perfused. Sensation is intact to light touch in the DP, SP, sural, saphenous, and tibial nerve distributions. 5/5 strength in ankle dorsiflexion, plantarflexion, inversion, and eversion. Ankle range of motion is 5 degrees of dorsiflexion to 35 degrees of plantarflexion. Painless hindfoot and midfoot range of motion.   No severe hallux, lesser toe malalignment or deformities, no pain with passive motion of lesser MTP joints, hallux MTP joint, no first TMT instability. Neurovascular exam is grossly intact for light touch sensation similar to contralateral lower extremity in all nerve distributions, EHL/FHL 5/5, 2+ dorsalis pedis pulse    Imaging:    XR Results (maximum last 2): Results from Appointment encounter on 03/10/23    XR STANDING FOOT RT MIN 3 V    Narrative  Right foot standing AP, lateral and oblique x-rays show forefoot hallux valgus deformity moderate deformity, lesser toe hammertoe most of the second toe, on lateral view not severe abnormality to Meary's angle and the talar head is not severely uncovered's mostly normal position. There are no acute fractures or dislocations, satisfactory bone density. XR STANDING ANKLE RT MIN 3 V    Narrative  Right ankle standing AP, lateral and oblique x-rays show satisfactory alignment of the ankle joint there is slight tendency towards valgus shown on the weightbearing AP view, fibular abutment, subtalar joint shows some narrowed joint space towards posterior facet, no acute fractures or dislocations, no acute abnormalities. Mild soft tissue swelling medially. An electronic signature was used to authenticate this note.   -- Feli Chavis MD

## 2023-03-10 NOTE — LETTER
3/14/2023    Patient: Tania Kwon   YOB: 1940   Date of Visit: 3/10/2023     Prince Cornelius MD  [address]  [address]  [address] [facility]  Via In Basket    Dear Prince Cornelius MD,      Thank you for referring Ms. Alfredo Garay to [facility] for evaluation. My notes for this consultation are attached. If you have questions, please do not hesitate to call me. I look forward to following your patient along with you.       Sincerely,    Kiya Silveira MD

## 2023-03-13 ENCOUNTER — HOSPITAL ENCOUNTER (OUTPATIENT)
Dept: CARDIAC REHAB | Age: 83
Discharge: HOME | End: 2023-03-13
Payer: SELF-PAY

## 2023-03-13 VITALS — WEIGHT: 142.8 LBS | BODY MASS INDEX: 27.89 KG/M2

## 2023-03-13 PROCEDURE — 74750000202 HC DAILY CARDIAC MAINTENANCE (RETAIL)

## 2023-03-15 ENCOUNTER — HOSPITAL ENCOUNTER (OUTPATIENT)
Dept: CARDIAC REHAB | Age: 83
Discharge: HOME | End: 2023-03-15
Payer: SELF-PAY

## 2023-03-15 VITALS — WEIGHT: 142.8 LBS | BODY MASS INDEX: 27.89 KG/M2

## 2023-03-15 PROCEDURE — 74750000202 HC DAILY CARDIAC MAINTENANCE (RETAIL)

## 2023-03-17 ENCOUNTER — HOSPITAL ENCOUNTER (OUTPATIENT)
Dept: CARDIAC REHAB | Age: 83
Discharge: HOME | End: 2023-03-17
Payer: SELF-PAY

## 2023-03-17 VITALS — BODY MASS INDEX: 27.97 KG/M2 | WEIGHT: 143.2 LBS

## 2023-03-17 PROCEDURE — 74750000202 HC DAILY CARDIAC MAINTENANCE (RETAIL)

## 2023-03-20 ENCOUNTER — APPOINTMENT (OUTPATIENT)
Dept: CARDIAC REHAB | Age: 83
End: 2023-03-20
Payer: MEDICARE

## 2023-03-22 ENCOUNTER — HOSPITAL ENCOUNTER (OUTPATIENT)
Dept: CARDIAC REHAB | Age: 83
Discharge: HOME | End: 2023-03-22
Payer: MEDICARE

## 2023-03-22 VITALS — BODY MASS INDEX: 27.99 KG/M2 | WEIGHT: 143.3 LBS

## 2023-03-22 PROCEDURE — 74750000202 HC DAILY CARDIAC MAINTENANCE (RETAIL)

## 2023-03-24 ENCOUNTER — HOSPITAL ENCOUNTER (OUTPATIENT)
Dept: CARDIAC REHAB | Age: 83
Discharge: HOME | End: 2023-03-24
Payer: MEDICARE

## 2023-03-24 VITALS — BODY MASS INDEX: 27.97 KG/M2 | WEIGHT: 143.2 LBS

## 2023-03-24 PROCEDURE — 74750000202 HC DAILY CARDIAC MAINTENANCE (RETAIL)

## 2023-03-27 ENCOUNTER — HOSPITAL ENCOUNTER (OUTPATIENT)
Dept: CARDIAC REHAB | Age: 83
Discharge: HOME | End: 2023-03-27
Payer: MEDICARE

## 2023-03-27 VITALS — BODY MASS INDEX: 27.97 KG/M2 | WEIGHT: 143.2 LBS

## 2023-03-27 PROCEDURE — 74750000202 HC DAILY CARDIAC MAINTENANCE (RETAIL)

## 2023-03-29 ENCOUNTER — HOSPITAL ENCOUNTER (OUTPATIENT)
Dept: CARDIAC REHAB | Age: 83
Discharge: HOME | End: 2023-03-29
Payer: MEDICARE

## 2023-03-29 VITALS — WEIGHT: 142.4 LBS | BODY MASS INDEX: 27.81 KG/M2

## 2023-03-29 PROCEDURE — 74750000202 HC DAILY CARDIAC MAINTENANCE (RETAIL)

## 2023-03-31 ENCOUNTER — HOSPITAL ENCOUNTER (OUTPATIENT)
Dept: CARDIAC REHAB | Age: 83
End: 2023-03-31
Payer: MEDICARE

## 2023-03-31 VITALS — WEIGHT: 142.8 LBS | BODY MASS INDEX: 27.89 KG/M2

## 2023-03-31 PROCEDURE — 74750000202 HC DAILY CARDIAC MAINTENANCE (RETAIL)

## 2023-04-04 ENCOUNTER — APPOINTMENT (OUTPATIENT)
Dept: CARDIAC REHAB | Age: 83
End: 2023-04-04
Payer: MEDICARE

## 2023-04-12 ENCOUNTER — APPOINTMENT (OUTPATIENT)
Dept: CARDIAC REHAB | Age: 83
End: 2023-04-12
Payer: MEDICARE

## 2023-04-14 ENCOUNTER — HOSPITAL ENCOUNTER (OUTPATIENT)
Dept: CARDIAC REHAB | Age: 83
Discharge: HOME | End: 2023-04-14
Payer: MEDICARE

## 2023-04-14 VITALS — WEIGHT: 143.8 LBS | BODY MASS INDEX: 28.08 KG/M2

## 2023-04-14 PROCEDURE — 74750000200

## 2023-05-04 DIAGNOSIS — F51.01 PRIMARY INSOMNIA: ICD-10-CM

## 2023-05-04 RX ORDER — DOXEPIN HYDROCHLORIDE 3 MG/1
TABLET ORAL
Qty: 30 TABLET | Refills: 1 | Status: SHIPPED | OUTPATIENT
Start: 2023-05-04

## 2023-06-07 ENCOUNTER — PATIENT MESSAGE (OUTPATIENT)
Age: 83
End: 2023-06-07

## 2023-06-07 RX ORDER — DOXEPIN HYDROCHLORIDE 3 MG/1
TABLET ORAL
Qty: 30 TABLET | Refills: 1 | Status: SHIPPED | OUTPATIENT
Start: 2023-06-07

## 2023-06-07 NOTE — TELEPHONE ENCOUNTER
From: Jorgito Davila  To: Dr. Yara Best  Sent: 6/7/2023 11:04 AM EDT  Subject: Doxipen refill    Please send a request to [facility] at [facility] for a refill on Doxipen, 3 mg. Could I have prescription written for greater than 30 tablets so request is made less frequently?     Thank you

## 2023-07-27 RX ORDER — DOXEPIN HYDROCHLORIDE 3 MG/1
TABLET ORAL
Qty: 30 TABLET | Refills: 1 | Status: SHIPPED | OUTPATIENT
Start: 2023-07-27

## 2023-07-31 RX ORDER — MELOXICAM 7.5 MG/1
TABLET ORAL
Qty: 25 TABLET | Refills: 0 | Status: SHIPPED | OUTPATIENT
Start: 2023-07-31

## 2023-08-10 NOTE — TELEPHONE ENCOUNTER
Spoke with patient and having no issues with mobic and she is taking one every other day, not daily.

## 2023-08-14 RX ORDER — BUSPIRONE HYDROCHLORIDE 5 MG/1
TABLET ORAL
Qty: 360 TABLET | Refills: 0 | Status: SHIPPED | OUTPATIENT
Start: 2023-08-14

## 2023-09-22 SDOH — HEALTH STABILITY: PHYSICAL HEALTH: ON AVERAGE, HOW MANY MINUTES DO YOU ENGAGE IN EXERCISE AT THIS LEVEL?: 30 MIN

## 2023-09-22 SDOH — HEALTH STABILITY: PHYSICAL HEALTH: ON AVERAGE, HOW MANY DAYS PER WEEK DO YOU ENGAGE IN MODERATE TO STRENUOUS EXERCISE (LIKE A BRISK WALK)?: 3 DAYS

## 2023-09-22 ASSESSMENT — PATIENT HEALTH QUESTIONNAIRE - PHQ9
SUM OF ALL RESPONSES TO PHQ QUESTIONS 1-9: 0
SUM OF ALL RESPONSES TO PHQ9 QUESTIONS 1 & 2: 0
SUM OF ALL RESPONSES TO PHQ QUESTIONS 1-9: 0
2. FEELING DOWN, DEPRESSED OR HOPELESS: 0
SUM OF ALL RESPONSES TO PHQ QUESTIONS 1-9: 0
1. LITTLE INTEREST OR PLEASURE IN DOING THINGS: 0
SUM OF ALL RESPONSES TO PHQ QUESTIONS 1-9: 0

## 2023-09-22 ASSESSMENT — LIFESTYLE VARIABLES
HOW MANY STANDARD DRINKS CONTAINING ALCOHOL DO YOU HAVE ON A TYPICAL DAY: PATIENT DOES NOT DRINK
HOW OFTEN DO YOU HAVE SIX OR MORE DRINKS ON ONE OCCASION: 1
HOW MANY STANDARD DRINKS CONTAINING ALCOHOL DO YOU HAVE ON A TYPICAL DAY: 0

## 2023-09-25 ENCOUNTER — OFFICE VISIT (OUTPATIENT)
Age: 83
End: 2023-09-25
Payer: MEDICARE

## 2023-09-25 VITALS
HEART RATE: 61 BPM | WEIGHT: 141.8 LBS | OXYGEN SATURATION: 97 % | TEMPERATURE: 97.7 F | DIASTOLIC BLOOD PRESSURE: 81 MMHG | HEIGHT: 60 IN | BODY MASS INDEX: 27.84 KG/M2 | RESPIRATION RATE: 14 BRPM | SYSTOLIC BLOOD PRESSURE: 136 MMHG

## 2023-09-25 DIAGNOSIS — I10 ESSENTIAL (PRIMARY) HYPERTENSION: ICD-10-CM

## 2023-09-25 DIAGNOSIS — F51.01 PRIMARY INSOMNIA: ICD-10-CM

## 2023-09-25 DIAGNOSIS — R73.09 ELEVATED GLUCOSE: ICD-10-CM

## 2023-09-25 DIAGNOSIS — Z00.00 MEDICARE ANNUAL WELLNESS VISIT, SUBSEQUENT: Primary | ICD-10-CM

## 2023-09-25 DIAGNOSIS — K58.9 IRRITABLE BOWEL SYNDROME WITHOUT DIARRHEA: ICD-10-CM

## 2023-09-25 DIAGNOSIS — E78.2 MIXED HYPERLIPIDEMIA: ICD-10-CM

## 2023-09-25 LAB
ALBUMIN SERPL-MCNC: 3.8 G/DL (ref 3.5–5)
ALBUMIN/GLOB SERPL: 1.2 (ref 1.1–2.2)
ALP SERPL-CCNC: 55 U/L (ref 45–117)
ALT SERPL-CCNC: 34 U/L (ref 12–78)
ANION GAP SERPL CALC-SCNC: 3 MMOL/L (ref 5–15)
AST SERPL-CCNC: 25 U/L (ref 15–37)
BILIRUB SERPL-MCNC: 0.5 MG/DL (ref 0.2–1)
BUN SERPL-MCNC: 24 MG/DL (ref 6–20)
BUN/CREAT SERPL: 24 (ref 12–20)
CALCIUM SERPL-MCNC: 9.1 MG/DL (ref 8.5–10.1)
CHLORIDE SERPL-SCNC: 103 MMOL/L (ref 97–108)
CHOLEST SERPL-MCNC: 173 MG/DL
CO2 SERPL-SCNC: 32 MMOL/L (ref 21–32)
CREAT SERPL-MCNC: 0.98 MG/DL (ref 0.55–1.02)
GLOBULIN SER CALC-MCNC: 3.2 G/DL (ref 2–4)
GLUCOSE SERPL-MCNC: 104 MG/DL (ref 65–100)
HDLC SERPL-MCNC: 60 MG/DL
HDLC SERPL: 2.9 (ref 0–5)
LDLC SERPL CALC-MCNC: 90.4 MG/DL (ref 0–100)
POTASSIUM SERPL-SCNC: 3.9 MMOL/L (ref 3.5–5.1)
PROT SERPL-MCNC: 7 G/DL (ref 6.4–8.2)
SODIUM SERPL-SCNC: 138 MMOL/L (ref 136–145)
TRIGL SERPL-MCNC: 113 MG/DL
VLDLC SERPL CALC-MCNC: 22.6 MG/DL

## 2023-09-25 PROCEDURE — 1123F ACP DISCUSS/DSCN MKR DOCD: CPT | Performed by: INTERNAL MEDICINE

## 2023-09-25 PROCEDURE — 3075F SYST BP GE 130 - 139MM HG: CPT | Performed by: INTERNAL MEDICINE

## 2023-09-25 PROCEDURE — G0439 PPPS, SUBSEQ VISIT: HCPCS | Performed by: INTERNAL MEDICINE

## 2023-09-25 PROCEDURE — 3079F DIAST BP 80-89 MM HG: CPT | Performed by: INTERNAL MEDICINE

## 2023-09-25 PROCEDURE — 99213 OFFICE O/P EST LOW 20 MIN: CPT | Performed by: INTERNAL MEDICINE

## 2023-09-25 RX ORDER — HYDROCHLOROTHIAZIDE 25 MG/1
25 TABLET ORAL DAILY
Qty: 90 TABLET | Refills: 1
Start: 2023-09-25

## 2023-09-25 RX ORDER — DOXEPIN HYDROCHLORIDE 3 MG/1
TABLET ORAL
Qty: 90 TABLET | Refills: 1 | Status: SHIPPED | OUTPATIENT
Start: 2023-09-25

## 2023-09-25 RX ORDER — ATORVASTATIN CALCIUM 20 MG/1
20 TABLET, FILM COATED ORAL DAILY
Qty: 90 TABLET | Refills: 3 | Status: SHIPPED | OUTPATIENT
Start: 2023-09-25

## 2023-09-25 RX ORDER — MAGNESIUM GLYCINATE 100 MG
400 CAPSULE ORAL
COMMUNITY

## 2023-09-25 RX ORDER — NEBIVOLOL 10 MG/1
10 TABLET ORAL DAILY
Qty: 90 TABLET | Refills: 3
Start: 2023-09-25

## 2023-09-25 RX ORDER — LISINOPRIL 30 MG/1
30 TABLET ORAL DAILY
Qty: 90 TABLET | Refills: 3 | Status: SHIPPED | OUTPATIENT
Start: 2023-09-25

## 2023-09-25 RX ORDER — BUSPIRONE HYDROCHLORIDE 5 MG/1
5 TABLET ORAL 2 TIMES DAILY
Qty: 180 TABLET | Refills: 3 | Status: SHIPPED | OUTPATIENT
Start: 2023-09-25

## 2023-09-25 SDOH — ECONOMIC STABILITY: INCOME INSECURITY: HOW HARD IS IT FOR YOU TO PAY FOR THE VERY BASICS LIKE FOOD, HOUSING, MEDICAL CARE, AND HEATING?: NOT HARD AT ALL

## 2023-09-25 SDOH — ECONOMIC STABILITY: HOUSING INSECURITY
IN THE LAST 12 MONTHS, WAS THERE A TIME WHEN YOU DID NOT HAVE A STEADY PLACE TO SLEEP OR SLEPT IN A SHELTER (INCLUDING NOW)?: NO

## 2023-09-25 SDOH — ECONOMIC STABILITY: FOOD INSECURITY: WITHIN THE PAST 12 MONTHS, THE FOOD YOU BOUGHT JUST DIDN'T LAST AND YOU DIDN'T HAVE MONEY TO GET MORE.: NEVER TRUE

## 2023-09-25 SDOH — ECONOMIC STABILITY: FOOD INSECURITY: WITHIN THE PAST 12 MONTHS, YOU WORRIED THAT YOUR FOOD WOULD RUN OUT BEFORE YOU GOT MONEY TO BUY MORE.: NEVER TRUE

## 2023-09-25 NOTE — PROGRESS NOTES
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            3 children: overweight son, smoker son 48 and 46,     Daughter healthy     Family History   Problem Relation Age of Onset    Diabetes Mother 61    Cancer Maternal Grandmother         colon ca    Diabetes Maternal Grandmother 61    Cancer Paternal Grandmother         intestinal ca    Heart Attack Paternal Grandmother     Stroke Paternal Grandmother     Heart Attack Paternal Grandfather     Stroke Paternal Grandfather      Current Outpatient Medications   Medication Sig Dispense Refill    lisinopriL (PRINIVIL, ZESTRIL) 30 mg tablet TAKE 1 TABLET BY MOUTH  DAILY 90 Tablet 3    busPIRone (BUSPAR) 5 mg tablet TAKE 1 TABLET BY MOUTH IN  THE MORNING AND MIDDAY AND  1 TO 2 TABLETS BY MOUTH IN  THE EVENING FOR ANXIETY 360 Tablet 0    atorvastatin (LIPITOR) 20 mg tablet TAKE 1 TABLET BY MOUTH  DAILY 90 Tablet 3    dicyclomine (BENTYL) 10 mg capsule Take 1 Capsule by mouth four (4) times daily. As needed. 30 Capsule 0    albuterol (PROVENTIL HFA, VENTOLIN HFA, PROAIR HFA) 90 mcg/actuation inhaler Take 1 Puff by inhalation every four (4) hours as needed for Wheezing. 1 Each 1    fexofenadine (ALLEGRA) 180 mg tablet Take 1 Tablet by mouth daily. 90 Tablet 1    guaiFENesin ER (MUCINEX) 600 mg ER tablet Take 1 Tab by mouth two (2) times a day. Take with water 30 Tab 0    famotidine (PEPCID) 20 mg tablet Take 20 mg by mouth two (2) times daily as needed. nebivolol (BYSTOLIC) 5 mg tablet Take 1 tablet by mouth daily. (Patient taking differently: Take 10 mg by mouth daily.) 30 tablet 0    cholecalciferol (VITAMIN D3) 25 mcg (1,000 unit) cap Take 2,000 Units by mouth. hydroCHLOROthiazide (HYDRODIURIL) 12.5 mg tablet TAKE 1 TABLET BY MOUTH  DAILY (Patient taking differently: Take 25 mg by mouth daily.) 90 Tablet 3    mexiletine (MEXITIL) 150 mg capsule 150 mg two (2) times a day.       cephALEXin

## 2023-09-26 LAB
EST. AVERAGE GLUCOSE BLD GHB EST-MCNC: 128 MG/DL
HBA1C MFR BLD: 6.1 % (ref 4–5.6)

## 2023-11-19 DIAGNOSIS — E78.2 MIXED HYPERLIPIDEMIA: ICD-10-CM

## 2023-11-20 RX ORDER — ATORVASTATIN CALCIUM 20 MG/1
20 TABLET, FILM COATED ORAL DAILY
Qty: 90 TABLET | Refills: 3 | Status: SHIPPED | OUTPATIENT
Start: 2023-11-20

## 2024-02-27 DIAGNOSIS — F51.01 PRIMARY INSOMNIA: ICD-10-CM

## 2024-02-27 RX ORDER — DOXEPIN 3 MG/1
TABLET, FILM COATED ORAL
Qty: 30 TABLET | Refills: 0 | Status: SHIPPED | OUTPATIENT
Start: 2024-02-27

## 2024-02-27 RX ORDER — BUSPIRONE HYDROCHLORIDE 5 MG/1
TABLET ORAL
Qty: 360 TABLET | Refills: 0 | Status: SHIPPED | OUTPATIENT
Start: 2024-02-27

## 2024-03-25 DIAGNOSIS — F51.01 PRIMARY INSOMNIA: ICD-10-CM

## 2024-03-25 RX ORDER — DOXEPIN 3 MG/1
TABLET, FILM COATED ORAL
Qty: 90 TABLET | Refills: 0 | Status: SHIPPED | OUTPATIENT
Start: 2024-03-25

## 2024-03-25 NOTE — TELEPHONE ENCOUNTER
Chief Complaint   Patient presents with    Medication Refill       Requested Prescriptions     Pending Prescriptions Disp Refills    doxepin (SILENOR) 3 MG TABS tablet [Pharmacy Med Name: DOXEPIN 3MG TABLETS] 90 tablet      Sig: TAKE ONE TABLET BY MOUTH 30 MINUTES PRIOR TO BEDTIME       Allergies:  Allergies   Allergen Reactions    Levofloxacin Other (See Comments)    Codeine Palpitations    Prednisone Palpitations    Rofecoxib Palpitations       Last visit with clinic:  9/25/2023   Next visit with clinic: 4/9/2024     Last visit with this provider: 9/25/2023   Next Visit with this provider: 4/9/2024    Signed by Guilherme CUELLAR  03/25/24  8:34 AM

## 2024-04-09 ENCOUNTER — OFFICE VISIT (OUTPATIENT)
Age: 84
End: 2024-04-09
Payer: MEDICARE

## 2024-04-09 VITALS
HEIGHT: 60 IN | WEIGHT: 145.6 LBS | RESPIRATION RATE: 14 BRPM | DIASTOLIC BLOOD PRESSURE: 76 MMHG | SYSTOLIC BLOOD PRESSURE: 143 MMHG | HEART RATE: 62 BPM | BODY MASS INDEX: 28.58 KG/M2 | TEMPERATURE: 97.4 F | OXYGEN SATURATION: 93 %

## 2024-04-09 DIAGNOSIS — G62.9 NEUROPATHY: Primary | ICD-10-CM

## 2024-04-09 DIAGNOSIS — I10 ESSENTIAL HYPERTENSION, BENIGN: ICD-10-CM

## 2024-04-09 DIAGNOSIS — G62.9 NEUROPATHY: ICD-10-CM

## 2024-04-09 PROCEDURE — 3078F DIAST BP <80 MM HG: CPT | Performed by: INTERNAL MEDICINE

## 2024-04-09 PROCEDURE — 3077F SYST BP >= 140 MM HG: CPT | Performed by: INTERNAL MEDICINE

## 2024-04-09 PROCEDURE — 1123F ACP DISCUSS/DSCN MKR DOCD: CPT | Performed by: INTERNAL MEDICINE

## 2024-04-09 PROCEDURE — 99213 OFFICE O/P EST LOW 20 MIN: CPT | Performed by: INTERNAL MEDICINE

## 2024-04-09 ASSESSMENT — PATIENT HEALTH QUESTIONNAIRE - PHQ9
2. FEELING DOWN, DEPRESSED OR HOPELESS: SEVERAL DAYS
SUM OF ALL RESPONSES TO PHQ QUESTIONS 1-9: 2
1. LITTLE INTEREST OR PLEASURE IN DOING THINGS: SEVERAL DAYS
SUM OF ALL RESPONSES TO PHQ9 QUESTIONS 1 & 2: 2
SUM OF ALL RESPONSES TO PHQ QUESTIONS 1-9: 2

## 2024-04-09 NOTE — PROGRESS NOTES
Arthritis     CAD (coronary artery disease)     Essential hypertension, benign     Fibromyalgia     Macular degeneration 2020    dr. Robertson    Myalgia and myositis, unspecified     Other and unspecified hyperlipidemia 10/12/2010    Palpitations 10/12/2010    Dr. Abel Mack    Sleep apnea     Dr. Alexandre, not taking cpap    SOB (shortness of breath)      Past Surgical History:   Procedure Laterality Date    HX HYSTERECTOMY      HX ORTHOPAEDIC      thumb and left foot     Social History     Socioeconomic History    Marital status:     Number of children: 3    Years of education: 14    Highest education level: Some college, no degree   Tobacco Use    Smoking status: Never    Smokeless tobacco: Never    Tobacco comments:     passive smoke with    Substance and Sexual Activity    Alcohol use: Yes     Comment: rare    Drug use: No    Sexual activity: Never     Comment:  for 54 years   Other Topics Concern     Service No   Social History Narrative        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,    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            3 children: overweight son, smoker son 50 and 51,     Daughter healthy     Family History   Problem Relation Age of Onset    Diabetes Mother 60    Cancer Maternal Grandmother         colon ca    Diabetes Maternal Grandmother 60    Cancer Paternal Grandmother         intestinal ca    Heart Attack Paternal Grandmother     Stroke Paternal Grandmother     Heart Attack Paternal Grandfather     Stroke Paternal Grandfather      Current Outpatient Medications   Medication Sig Dispense Refill    lisinopriL (PRINIVIL, ZESTRIL) 30 mg tablet TAKE 1 TABLET BY MOUTH  DAILY 90 Tablet 3    busPIRone (BUSPAR) 5 mg tablet TAKE 1 TABLET BY MOUTH IN  THE MORNING AND MIDDAY AND  1 TO 2 TABLETS BY MOUTH IN  THE EVENING FOR ANXIETY 360 Tablet 0    atorvastatin (LIPITOR) 20 mg tablet TAKE 1 TABLET BY MOUTH  DAILY 90 Tablet 3    dicyclomine (BENTYL) 10 mg capsule Take 1 Capsule by

## 2024-04-10 LAB
ALBUMIN SERPL-MCNC: 3.8 G/DL (ref 3.5–5)
ALBUMIN/GLOB SERPL: 1.4 (ref 1.1–2.2)
ALP SERPL-CCNC: 57 U/L (ref 45–117)
ALT SERPL-CCNC: 24 U/L (ref 12–78)
ANION GAP SERPL CALC-SCNC: 7 MMOL/L (ref 5–15)
AST SERPL-CCNC: 17 U/L (ref 15–37)
BILIRUB SERPL-MCNC: 0.4 MG/DL (ref 0.2–1)
BUN SERPL-MCNC: 23 MG/DL (ref 6–20)
BUN/CREAT SERPL: 26 (ref 12–20)
CALCIUM SERPL-MCNC: 9.6 MG/DL (ref 8.5–10.1)
CHLORIDE SERPL-SCNC: 104 MMOL/L (ref 97–108)
CO2 SERPL-SCNC: 30 MMOL/L (ref 21–32)
CREAT SERPL-MCNC: 0.9 MG/DL (ref 0.55–1.02)
EST. AVERAGE GLUCOSE BLD GHB EST-MCNC: 128 MG/DL
FOLATE SERPL-MCNC: 16.9 NG/ML (ref 5–21)
GLOBULIN SER CALC-MCNC: 2.8 G/DL (ref 2–4)
GLUCOSE SERPL-MCNC: 103 MG/DL (ref 65–100)
HBA1C MFR BLD: 6.1 % (ref 4–5.6)
POTASSIUM SERPL-SCNC: 4 MMOL/L (ref 3.5–5.1)
PROT SERPL-MCNC: 6.6 G/DL (ref 6.4–8.2)
SODIUM SERPL-SCNC: 141 MMOL/L (ref 136–145)
T4 FREE SERPL-MCNC: 0.9 NG/DL (ref 0.8–1.5)
TSH SERPL DL<=0.05 MIU/L-ACNC: 1.83 UIU/ML (ref 0.36–3.74)
VIT B12 SERPL-MCNC: 342 PG/ML (ref 193–986)

## 2024-04-30 DIAGNOSIS — F51.01 PRIMARY INSOMNIA: ICD-10-CM

## 2024-04-30 RX ORDER — DOXEPIN 3 MG/1
TABLET, FILM COATED ORAL
Qty: 30 TABLET | Refills: 0 | Status: SHIPPED | OUTPATIENT
Start: 2024-04-30

## 2024-04-30 RX ORDER — BUSPIRONE HYDROCHLORIDE 5 MG/1
TABLET ORAL
Qty: 360 TABLET | Refills: 0 | Status: SHIPPED | OUTPATIENT
Start: 2024-04-30

## 2024-06-24 DIAGNOSIS — F51.01 PRIMARY INSOMNIA: ICD-10-CM

## 2024-06-25 RX ORDER — DOXEPIN 3 MG/1
TABLET, FILM COATED ORAL
Qty: 30 TABLET | Refills: 0 | Status: SHIPPED | OUTPATIENT
Start: 2024-06-25

## 2024-06-25 RX ORDER — BUSPIRONE HYDROCHLORIDE 5 MG/1
TABLET ORAL
Qty: 360 TABLET | Refills: 0 | Status: SHIPPED | OUTPATIENT
Start: 2024-06-25

## 2024-09-09 DIAGNOSIS — F51.01 PRIMARY INSOMNIA: ICD-10-CM

## 2024-09-09 RX ORDER — DOXEPIN 3 MG/1
TABLET, FILM COATED ORAL
Qty: 90 TABLET | Refills: 0 | Status: SHIPPED | OUTPATIENT
Start: 2024-09-09

## 2024-09-14 DIAGNOSIS — E78.2 MIXED HYPERLIPIDEMIA: ICD-10-CM

## 2024-09-16 RX ORDER — ATORVASTATIN CALCIUM 20 MG/1
20 TABLET, FILM COATED ORAL DAILY
Qty: 90 TABLET | Refills: 3 | Status: SHIPPED | OUTPATIENT
Start: 2024-09-16

## 2024-09-18 DIAGNOSIS — F51.01 PRIMARY INSOMNIA: ICD-10-CM

## 2024-09-18 RX ORDER — BUSPIRONE HYDROCHLORIDE 5 MG/1
TABLET ORAL
Qty: 90 TABLET | Refills: 0 | Status: SHIPPED | OUTPATIENT
Start: 2024-09-18

## 2024-10-29 ENCOUNTER — OFFICE VISIT (OUTPATIENT)
Age: 84
End: 2024-10-29
Payer: MEDICARE

## 2024-10-29 VITALS
HEART RATE: 71 BPM | SYSTOLIC BLOOD PRESSURE: 153 MMHG | BODY MASS INDEX: 27.72 KG/M2 | DIASTOLIC BLOOD PRESSURE: 73 MMHG | OXYGEN SATURATION: 94 % | RESPIRATION RATE: 14 BRPM | WEIGHT: 141.2 LBS | HEIGHT: 60 IN

## 2024-10-29 DIAGNOSIS — I10 ESSENTIAL (PRIMARY) HYPERTENSION: ICD-10-CM

## 2024-10-29 DIAGNOSIS — R73.09 ELEVATED GLUCOSE: ICD-10-CM

## 2024-10-29 DIAGNOSIS — R05.9 COUGH, UNSPECIFIED TYPE: ICD-10-CM

## 2024-10-29 DIAGNOSIS — M19.90 ARTHRITIS PAIN: ICD-10-CM

## 2024-10-29 DIAGNOSIS — G62.9 NEUROPATHY: ICD-10-CM

## 2024-10-29 DIAGNOSIS — Z00.00 MEDICARE ANNUAL WELLNESS VISIT, SUBSEQUENT: Primary | ICD-10-CM

## 2024-10-29 LAB — HBA1C MFR BLD: 5.7 %

## 2024-10-29 PROCEDURE — 83036 HEMOGLOBIN GLYCOSYLATED A1C: CPT | Performed by: INTERNAL MEDICINE

## 2024-10-29 PROCEDURE — 99214 OFFICE O/P EST MOD 30 MIN: CPT | Performed by: INTERNAL MEDICINE

## 2024-10-29 RX ORDER — MELOXICAM 7.5 MG/1
7.5 TABLET ORAL DAILY
Qty: 25 TABLET | Refills: 1 | Status: SHIPPED | OUTPATIENT
Start: 2024-10-29

## 2024-10-29 RX ORDER — ALBUTEROL SULFATE 90 UG/1
1 INHALANT RESPIRATORY (INHALATION) EVERY 4 HOURS PRN
Qty: 18 G | Refills: 2 | Status: SHIPPED | OUTPATIENT
Start: 2024-10-29

## 2024-10-29 SDOH — HEALTH STABILITY: PHYSICAL HEALTH: ON AVERAGE, HOW MANY MINUTES DO YOU ENGAGE IN EXERCISE AT THIS LEVEL?: 20 MIN

## 2024-10-29 SDOH — ECONOMIC STABILITY: FOOD INSECURITY: WITHIN THE PAST 12 MONTHS, THE FOOD YOU BOUGHT JUST DIDN'T LAST AND YOU DIDN'T HAVE MONEY TO GET MORE.: NEVER TRUE

## 2024-10-29 SDOH — ECONOMIC STABILITY: FOOD INSECURITY: WITHIN THE PAST 12 MONTHS, YOU WORRIED THAT YOUR FOOD WOULD RUN OUT BEFORE YOU GOT MONEY TO BUY MORE.: NEVER TRUE

## 2024-10-29 SDOH — ECONOMIC STABILITY: INCOME INSECURITY: HOW HARD IS IT FOR YOU TO PAY FOR THE VERY BASICS LIKE FOOD, HOUSING, MEDICAL CARE, AND HEATING?: NOT HARD AT ALL

## 2024-10-29 SDOH — ECONOMIC STABILITY: TRANSPORTATION INSECURITY
IN THE PAST 12 MONTHS, HAS LACK OF TRANSPORTATION KEPT YOU FROM MEETINGS, WORK, OR FROM GETTING THINGS NEEDED FOR DAILY LIVING?: NO

## 2024-10-29 ASSESSMENT — LIFESTYLE VARIABLES
HOW MANY STANDARD DRINKS CONTAINING ALCOHOL DO YOU HAVE ON A TYPICAL DAY: 1 OR 2
HOW OFTEN DO YOU HAVE A DRINK CONTAINING ALCOHOL: MONTHLY OR LESS
HOW OFTEN DO YOU HAVE A DRINK CONTAINING ALCOHOL: 2
HOW MANY STANDARD DRINKS CONTAINING ALCOHOL DO YOU HAVE ON A TYPICAL DAY: 1
HOW OFTEN DO YOU HAVE SIX OR MORE DRINKS ON ONE OCCASION: 1

## 2024-10-29 ASSESSMENT — PATIENT HEALTH QUESTIONNAIRE - PHQ9
SUM OF ALL RESPONSES TO PHQ QUESTIONS 1-9: 0
1. LITTLE INTEREST OR PLEASURE IN DOING THINGS: NOT AT ALL
2. FEELING DOWN, DEPRESSED OR HOPELESS: NOT AT ALL
SUM OF ALL RESPONSES TO PHQ QUESTIONS 1-9: 0
SUM OF ALL RESPONSES TO PHQ QUESTIONS 1-9: 0
SUM OF ALL RESPONSES TO PHQ9 QUESTIONS 1 & 2: 0
SUM OF ALL RESPONSES TO PHQ QUESTIONS 1-9: 0

## 2024-10-29 NOTE — PROGRESS NOTES
TAKE 1 TABLET BY MOUTH ONCE  DAILY Yes Valencia Duran MD   doxepin (SILENOR) 3 MG TABS tablet TAKE 1 TABLET BY MOUTH 1/2 HOUR  BEFORE BEDTIME Yes Valencia Duran MD   lisinopril (PRINIVIL;ZESTRIL) 30 MG tablet TAKE 1 TABLET BY MOUTH DAILY Yes Valencia Duran MD   Magnesium Glycinate 100 MG CAPS Take 400 mg by mouth Yes Provider, MD Miriam   hydroCHLOROthiazide (HYDRODIURIL) 25 MG tablet Take 1 tablet by mouth daily cardiolgy Yes Valencia Duran MD   nebivolol (BYSTOLIC) 10 MG tablet Take 1 tablet by mouth daily Cardilogy rxing Yes Valencia Duran MD   vitamin D 25 MCG (1000 UT) CAPS Take 2 capsules by mouth Yes Automatic Reconciliation, Ar   dicyclomine (BENTYL) 10 MG capsule Take 1 capsule by mouth 4 times daily Yes Automatic Reconciliation, Ar   fexofenadine (ALLEGRA) 180 MG tablet Take 1 tablet by mouth daily Yes Automatic Reconciliation, Ar   zoster recombinant adjuvanted vaccine (SHINGRIX) 50 MCG/0.5ML SUSR injection 0.5mL by IntraMUSCular route once now and then repeat in 2-6 months Yes Automatic Reconciliation, Ar       CareTeam (Including outside providers/suppliers regularly involved in providing care):   Patient Care Team:  Valencia Duran MD as PCP - General  Valencia Duran MD as PCP - Empaneled Provider      Reviewed and updated this visit:  Allergies  Med Hx  Surg Hx  Soc Hx  Fam Hx            This medical record was transcribed using an electronic medical records/speech recognition system.  Although proofread, it may and can contain electronic, spelling and other errors.  Corrections may be executed at a later time.  Please contact us for any clarifications as needed.  Aside from patient's Medicare visitOn this date 10/29/24  I have spent 30 minutes reviewing previous notes, test results and face to face with the patient discussing the diagnosis and importance of compliance with the treatment plan as well as documenting on

## 2024-10-29 NOTE — PATIENT INSTRUCTIONS
to screen for glaucoma; cataracts, macular degeneration, and other eye disorders.  A preventive dental visit is recommended every 6 months.  Try to get at least 150 minutes of exercise per week or 10,000 steps per day on a pedometer .  Order or download the FREE \"Exercise & Physical Activity: Your Everyday Guide\" from [employer]. Call 1-201.103.5712 or search [employer] online.  You need 6501-8026 mg of calcium and 5764-9566 IU of vitamin D per day. It is possible to meet your calcium requirement with diet alone, but a vitamin D supplement is usually necessary to meet this goal.  When exposed to the sun, use a sunscreen that protects against both UVA and UVB radiation with an SPF of 30 or greater. Reapply every 2 to 3 hours or after sweating, drying off with a towel, or swimming.  Always wear a seat belt when traveling in a car. Always wear a helmet when riding a bicycle or motorcycle.

## 2024-10-30 RX ORDER — LISINOPRIL 30 MG/1
30 TABLET ORAL DAILY
Qty: 90 TABLET | Refills: 3 | Status: SHIPPED | OUTPATIENT
Start: 2024-10-30

## 2024-11-01 LAB
ALBUMIN SERPL-MCNC: 4.2 G/DL (ref 3.7–4.7)
ALP SERPL-CCNC: 58 IU/L (ref 44–121)
ALT SERPL-CCNC: 16 IU/L (ref 0–32)
AST SERPL-CCNC: 19 IU/L (ref 0–40)
BILIRUB SERPL-MCNC: 0.5 MG/DL (ref 0–1.2)
BUN SERPL-MCNC: 24 MG/DL (ref 8–27)
BUN/CREAT SERPL: 28 (ref 12–28)
CALCIUM SERPL-MCNC: 9.4 MG/DL (ref 8.7–10.3)
CHLORIDE SERPL-SCNC: 100 MMOL/L (ref 96–106)
CHOLEST SERPL-MCNC: 154 MG/DL (ref 100–199)
CO2 SERPL-SCNC: 27 MMOL/L (ref 20–29)
CREAT SERPL-MCNC: 0.86 MG/DL (ref 0.57–1)
EGFRCR SERPLBLD CKD-EPI 2021: 66 ML/MIN/1.73
GLOBULIN SER CALC-MCNC: 2 G/DL (ref 1.5–4.5)
GLUCOSE SERPL-MCNC: 102 MG/DL (ref 70–99)
HBA1C MFR BLD: 6.3 % (ref 4.8–5.6)
HDLC SERPL-MCNC: 53 MG/DL
IMP & REVIEW OF LAB RESULTS: NORMAL
LDLC SERPL CALC-MCNC: 80 MG/DL (ref 0–99)
POTASSIUM SERPL-SCNC: 4.1 MMOL/L (ref 3.5–5.2)
PROT SERPL-MCNC: 6.2 G/DL (ref 6–8.5)
SODIUM SERPL-SCNC: 141 MMOL/L (ref 134–144)
TRIGL SERPL-MCNC: 116 MG/DL (ref 0–149)
VLDLC SERPL CALC-MCNC: 21 MG/DL (ref 5–40)

## 2024-11-15 ENCOUNTER — TELEPHONE (OUTPATIENT)
Age: 84
End: 2024-11-15

## 2024-11-15 NOTE — TELEPHONE ENCOUNTER
The patient is requesting a call back to discuss a missed called. She stated that she is concern because no voicemail was left. Psr offered an appointment she decline and stated that she will like to be advised. [phone]

## 2024-11-25 DIAGNOSIS — F51.01 PRIMARY INSOMNIA: ICD-10-CM

## 2024-11-26 RX ORDER — DOXEPIN 3 MG/1
TABLET, FILM COATED ORAL
Qty: 90 TABLET | Refills: 0 | Status: SHIPPED | OUTPATIENT
Start: 2024-11-26

## 2024-11-26 RX ORDER — BUSPIRONE HYDROCHLORIDE 5 MG/1
TABLET ORAL
Qty: 90 TABLET | Refills: 0 | Status: SHIPPED | OUTPATIENT
Start: 2024-11-26

## 2025-02-03 DIAGNOSIS — F51.01 PRIMARY INSOMNIA: ICD-10-CM

## 2025-02-05 RX ORDER — DOXEPIN 3 MG/1
TABLET, FILM COATED ORAL
Qty: 90 TABLET | Refills: 0 | Status: SHIPPED | OUTPATIENT
Start: 2025-02-05

## 2025-02-05 NOTE — TELEPHONE ENCOUNTER
PCP: Valencia Duran MD    Last Appointment: Visit date not found    No future appointments.    Requested Prescriptions     Pending Prescriptions Disp Refills    doxepin (SILENOR) 3 MG TABS tablet [Pharmacy Med Name: DOXEPIN  3MG  TAB] 90 tablet 0     Sig: TAKE 1 TABLET BY MOUTH 1/2 HOUR  BEFORE BEDTIME       LAST ORDERED: 11/26/24      Xochitl \"Jeet\" BERNIE Camacho

## 2025-03-21 ENCOUNTER — OFFICE VISIT (OUTPATIENT)
Facility: CLINIC | Age: 85
End: 2025-03-21
Payer: MEDICARE

## 2025-03-21 VITALS
RESPIRATION RATE: 16 BRPM | OXYGEN SATURATION: 97 % | TEMPERATURE: 98 F | BODY MASS INDEX: 27.17 KG/M2 | WEIGHT: 138.4 LBS | DIASTOLIC BLOOD PRESSURE: 82 MMHG | SYSTOLIC BLOOD PRESSURE: 138 MMHG | HEIGHT: 60 IN | HEART RATE: 73 BPM

## 2025-03-21 DIAGNOSIS — H69.91 DYSFUNCTION OF RIGHT EUSTACHIAN TUBE: Primary | ICD-10-CM

## 2025-03-21 DIAGNOSIS — S60.459A SPLINTER OF FINGER: ICD-10-CM

## 2025-03-21 PROCEDURE — 1123F ACP DISCUSS/DSCN MKR DOCD: CPT | Performed by: NURSE PRACTITIONER

## 2025-03-21 PROCEDURE — G8427 DOCREV CUR MEDS BY ELIG CLIN: HCPCS | Performed by: NURSE PRACTITIONER

## 2025-03-21 PROCEDURE — 1160F RVW MEDS BY RX/DR IN RCRD: CPT | Performed by: NURSE PRACTITIONER

## 2025-03-21 PROCEDURE — 1036F TOBACCO NON-USER: CPT | Performed by: NURSE PRACTITIONER

## 2025-03-21 PROCEDURE — 1090F PRES/ABSN URINE INCON ASSESS: CPT | Performed by: NURSE PRACTITIONER

## 2025-03-21 PROCEDURE — G8419 CALC BMI OUT NRM PARAM NOF/U: HCPCS | Performed by: NURSE PRACTITIONER

## 2025-03-21 PROCEDURE — 99213 OFFICE O/P EST LOW 20 MIN: CPT | Performed by: NURSE PRACTITIONER

## 2025-03-21 PROCEDURE — 1159F MED LIST DOCD IN RCRD: CPT | Performed by: NURSE PRACTITIONER

## 2025-03-21 RX ORDER — AZITHROMYCIN 250 MG/1
TABLET, FILM COATED ORAL
Qty: 6 TABLET | Refills: 0 | Status: SHIPPED | OUTPATIENT
Start: 2025-03-21 | End: 2025-03-31

## 2025-03-21 RX ORDER — FLUTICASONE PROPIONATE 50 MCG
2 SPRAY, SUSPENSION (ML) NASAL DAILY
Qty: 48 G | Refills: 1 | Status: SHIPPED | OUTPATIENT
Start: 2025-03-21

## 2025-03-21 SDOH — ECONOMIC STABILITY: FOOD INSECURITY: WITHIN THE PAST 12 MONTHS, THE FOOD YOU BOUGHT JUST DIDN'T LAST AND YOU DIDN'T HAVE MONEY TO GET MORE.: NEVER TRUE

## 2025-03-21 SDOH — ECONOMIC STABILITY: FOOD INSECURITY: WITHIN THE PAST 12 MONTHS, YOU WORRIED THAT YOUR FOOD WOULD RUN OUT BEFORE YOU GOT MONEY TO BUY MORE.: NEVER TRUE

## 2025-03-21 ASSESSMENT — ENCOUNTER SYMPTOMS
GASTROINTESTINAL NEGATIVE: 1
VOMITING: 0
EYES NEGATIVE: 1
BACK PAIN: 0
CONSTIPATION: 0
SHORTNESS OF BREATH: 0
NAUSEA: 0
SINUS PAIN: 0
CHEST TIGHTNESS: 0
SINUS PRESSURE: 0
DIARRHEA: 0
RESPIRATORY NEGATIVE: 1
EYE PAIN: 0
ABDOMINAL PAIN: 0
RHINORRHEA: 0
SORE THROAT: 0
BLOOD IN STOOL: 0
EYE REDNESS: 0
COUGH: 0

## 2025-03-21 ASSESSMENT — PATIENT HEALTH QUESTIONNAIRE - PHQ9
SUM OF ALL RESPONSES TO PHQ QUESTIONS 1-9: 0
2. FEELING DOWN, DEPRESSED OR HOPELESS: NOT AT ALL
1. LITTLE INTEREST OR PLEASURE IN DOING THINGS: NOT AT ALL
SUM OF ALL RESPONSES TO PHQ QUESTIONS 1-9: 0

## 2025-03-21 NOTE — PROGRESS NOTES
Assessment and Plan     1. Dysfunction of right eustachian tube: Recommended to re-start PO Allegra. Corticosteroid nasal spray with Flonase nasal spray recommended. Low dose z-pack abx tx given for possible infection. Will follow up with ENT next month. Pt agreed with plan  -     fluticasone (FLONASE) 50 MCG/ACT nasal spray; 2 sprays by Each Nostril route daily, Disp-48 g, R-1Normal  -     azithromycin (ZITHROMAX) 250 MG tablet; 500mg on day 1 followed by 250mg on days 2 - 5, Disp-6 tablet, R-0Normal  2. Splinter of finger: Warm compresses 3-4 times per day and OTC Hydrocortisone 1% to area BID for 5 days. SXS of infection discussed. Pt verbalized understanding        Benefits, risks, possible drug interactions, and side effects of all new medications were reviewed with the patient. Pt verbalized understanding.    An electronic signature was used to authenticate this note.  Gracy Howard, APRN - CNP  3/21/2025      Follow-up and Dispositions    Return if symptoms worsen or fail to improve.          History of Present Illness   Chief Complaint     Migdalia Jay is a 85 y.o. female here for had concerns including Ear Pain and Finger Pain.   Mrs. Jay presents today with reports R ear pain for the past 8 days ago. Pt was seen at [facility] urgent care on 03/14/2025 dysfunction of R eustachian tube, given prednisone PO which she completed with compliance. Has an appt with ENT next month. Denies ear discharge, sore throat, cough.   Pt also reports R index finger splinter noted over 2 weeks ago noted after working in her garden. Has not treated at home.       Review of Systems  Review of Systems   Constitutional: Negative.  Negative for chills, fatigue, fever and unexpected weight change.   HENT:  Positive for ear pain (R ear pain). Negative for congestion, ear discharge, rhinorrhea, sinus pressure, sinus pain, sore throat and tinnitus.    Eyes: Negative.  Negative for pain, redness and visual

## 2025-04-23 DIAGNOSIS — F51.01 PRIMARY INSOMNIA: ICD-10-CM

## 2025-04-25 RX ORDER — DOXEPIN 3 MG/1
TABLET, FILM COATED ORAL
Qty: 90 TABLET | Refills: 0 | Status: SHIPPED | OUTPATIENT
Start: 2025-04-25

## 2025-05-28 ENCOUNTER — TELEPHONE (OUTPATIENT)
Facility: CLINIC | Age: 85
End: 2025-05-28

## 2025-08-05 DIAGNOSIS — E78.2 MIXED HYPERLIPIDEMIA: ICD-10-CM

## 2025-08-05 RX ORDER — ATORVASTATIN CALCIUM 20 MG/1
20 TABLET, FILM COATED ORAL DAILY
Qty: 90 TABLET | Refills: 0 | Status: SHIPPED | OUTPATIENT
Start: 2025-08-05

## 2025-09-04 DIAGNOSIS — F51.01 PRIMARY INSOMNIA: ICD-10-CM

## 2025-09-04 RX ORDER — DOXEPIN 3 MG/1
TABLET, FILM COATED ORAL
Qty: 90 TABLET | Refills: 0 | Status: SHIPPED | OUTPATIENT
Start: 2025-09-04